# Patient Record
Sex: MALE | Race: BLACK OR AFRICAN AMERICAN | Employment: OTHER | URBAN - METROPOLITAN AREA
[De-identification: names, ages, dates, MRNs, and addresses within clinical notes are randomized per-mention and may not be internally consistent; named-entity substitution may affect disease eponyms.]

---

## 2022-09-01 ENCOUNTER — HOSPITAL ENCOUNTER (EMERGENCY)
Age: 42
Discharge: NURSING, REHAB OR LONG-TERM CARE | End: 2022-09-01
Attending: EMERGENCY MEDICINE
Payer: COMMERCIAL

## 2022-09-01 ENCOUNTER — APPOINTMENT (OUTPATIENT)
Dept: GENERAL RADIOLOGY | Age: 42
End: 2022-09-01
Payer: COMMERCIAL

## 2022-09-01 VITALS
HEART RATE: 102 BPM | SYSTOLIC BLOOD PRESSURE: 119 MMHG | TEMPERATURE: 98.6 F | HEIGHT: 73 IN | RESPIRATION RATE: 18 BRPM | DIASTOLIC BLOOD PRESSURE: 72 MMHG | OXYGEN SATURATION: 97 %
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
## 2022-09-01 PROCEDURE — 72170 X-RAY EXAM OF PELVIS: CPT

## 2022-09-01 PROCEDURE — 6370000000 HC RX 637 (ALT 250 FOR IP): Performed by: EMERGENCY MEDICINE

## 2022-09-01 PROCEDURE — 73552 X-RAY EXAM OF FEMUR 2/>: CPT

## 2022-09-01 PROCEDURE — 73560 X-RAY EXAM OF KNEE 1 OR 2: CPT

## 2022-09-01 PROCEDURE — 99283 EMERGENCY DEPT VISIT LOW MDM: CPT

## 2022-09-01 RX ORDER — IBUPROFEN 400 MG/1
800 TABLET ORAL ONCE
Status: COMPLETED | OUTPATIENT
Start: 2022-09-01 | End: 2022-09-01

## 2022-09-01 RX ADMIN — IBUPROFEN 800 MG: 400 TABLET, FILM COATED ORAL at 19:29

## 2022-09-01 NOTE — ED PROVIDER NOTES
[address] [facility]          ATTENDING PHYSICIAN NOTE       Date of evaluation: 9/1/2022    Chief Complaint     Fall Iris Shah in hallway and hit head, co shoulder pain )      History of Present Illness     Sushil Manley is a 38 y.o. male who presents the emergency department with a complaint of left leg pain after a fall. Patient had a witnessed mechanical fall in the hallway of his nursing facility. He has a history of psychiatric disease as well as MRDD and is a resident of a nursing facility. Reports are that he was walking on the hallway when his knee buckled underneath him secondary to a previous injury on the left ankle. He has a known nonunion fracture of the left ankle. Patient states that he did strike his head there is no report that he passed out or lost consciousness no report of shaking episode or seizure-like activity. Patient is not the most consistent historian but is noting pain in the left knee and left upper leg. Otherwise denies headache. Has not had any observed nausea and vomiting since the time of the fall    Review of Systems     As documented in the HPI, otherwise all other systems were reviewed and were negative. Past Medical, Surgical, Family, and Social History     He has no past medical history on file. He has no past surgical history on file. His family history is not on file. He reports that he has been smoking cigarettes. He has never used smokeless tobacco. He reports current drug use. Drug: Marijuana Quintero Kugel). He reports that he does not drink alcohol. Medications     Previous Medications    No medications on file       Allergies     He has No Known Allergies.     Physical Exam     INITIAL VITALS: BP: 119/72, Temp: 98.6 °F (37 °C), Heart Rate: (!) 102, Resp: 18, SpO2: 97 %   General: 38year-old male sitting in bed no apparent cardiorespiratory distress  HEENT:  head is atraumatic, sclera are clear, oropharynx is nonerythematous, mucus membranes are moist  Neck: Trachea midline  Chest: Nonlabored respirations  Cardiovascular: Regular, rate, and rhythm, 2+ radial pulses bilaterally  Abdominal: Nondistended abdomen  Skin: Warm, dry well perfused, ecchymoses over the left knee  Musculoskeletal: Tenderness palpation with some subcutaneous swelling and ecchymoses over the left knee, some mild tenderness in the left proximal femur as well with a negative logroll, otherwise does not demonstrate any tenderness or bony deformity on palpation of his other extremities  Neurologic:  speech is clear and intact without dysarthria, intact strength in all 4 extremities, sensation intact light touch in all 4 extremities, GCS of 15. Diagnostic Results     RADIOLOGY:  XR KNEE LEFT (1-2 VIEWS)   Final Result   Impression:   No acute osseous injury. XR FEMUR LEFT (MIN 2 VIEWS)   Final Result   Impression:   No acute osseous injury. Nonspecific soft tissue calcification the proximal left thigh. XR PELVIS (1-2 VIEWS)   Final Result   Impression:   No acute osseous injury. LABS:   No results found for this visit on 09/01/22. ED BEDSIDE ULTRASOUND:  No results found. RECENT VITALS:  BP: 119/72, Temp: 98.6 °F (37 °C), Heart Rate: (!) 102, Resp: 18, SpO2: 97 %       ED Course     Nursing Notes, Past Medical Hx, Past Surgical Hx, Social Hx, Allergies, and Family Hx were reviewed. The patient was given the following medications:  Orders Placed This Encounter   Medications    ibuprofen (ADVIL;MOTRIN) tablet 800 mg       CONSULTS:  None    Gus Billyeni / RYANN / Yasmin Inge is a 38 y.o. male presenting to the emergency department after an apparent mechanical fall while walking in the hallway of his nursing facility.   This was witnessed he may or may not have struck his head but on physical examination has no observable signs of trauma above the clavicles and is at his baseline mental status has not had any high risk features and does not have a high risk mechanism of injury therefore I did not feels if any neuro imaging was necessary. The patient had an x-ray of the left knee left femur and left hip with pelvis performed given his ecchymoses and tenderness on exam.  These have resulted back showing no evidence of any acute fractures. Patient was felt safe for discharge back to his care facility. He was given ibuprofen for treatment of pain while here in the emergency department with did improve his symptoms somewhat. Clinical Impression     1.  Fall, initial encounter        Disposition     PATIENT REFERRED TO:  [facility] Emergency Department  [address]  [phone]    If symptoms worsen    DISCHARGE MEDICATIONS:  New Prescriptions    No medications on file       DISPOSITION Decision To Discharge 09/01/2022 07:27:39 PM             Alis Jay MD  09/01/22 Jarett Hicks

## 2022-09-02 NOTE — ED NOTES
Discharge instructions provided to patient by RN at bedside. No further concerns addressed at this time.      Silvio Koroma RN  09/01/22 0855

## 2022-11-15 ENCOUNTER — HOSPITAL ENCOUNTER (INPATIENT)
Age: 42
LOS: 8 days | Discharge: NURSING, REHAB OR LONG-TERM CARE | DRG: 287 | End: 2022-11-23
Attending: EMERGENCY MEDICINE | Admitting: INTERNAL MEDICINE
Payer: COMMERCIAL

## 2022-11-15 ENCOUNTER — APPOINTMENT (OUTPATIENT)
Dept: GENERAL RADIOLOGY | Age: 42
DRG: 287 | End: 2022-11-15
Payer: COMMERCIAL

## 2022-11-15 DIAGNOSIS — R77.8 TROPONIN LEVEL ELEVATED: Primary | ICD-10-CM

## 2022-11-15 DIAGNOSIS — R73.9 HYPERGLYCEMIA: ICD-10-CM

## 2022-11-15 PROBLEM — R07.9 CHEST PAIN: Status: ACTIVE | Noted: 2022-11-15

## 2022-11-15 LAB
A/G RATIO: 1.7 (ref 1.1–2.2)
ALBUMIN SERPL-MCNC: 4.3 G/DL (ref 3.4–5)
ALP BLD-CCNC: 135 U/L (ref 40–129)
ALT SERPL-CCNC: 64 U/L (ref 10–40)
ANION GAP SERPL CALCULATED.3IONS-SCNC: 13 MMOL/L (ref 3–16)
AST SERPL-CCNC: 19 U/L (ref 15–37)
BANDED NEUTROPHILS RELATIVE PERCENT: 2 % (ref 0–7)
BASE EXCESS VENOUS: 3.9 MMOL/L (ref -2–3)
BASOPHILS ABSOLUTE: 0 K/UL (ref 0–0.2)
BASOPHILS RELATIVE PERCENT: 0 %
BILIRUB SERPL-MCNC: <0.2 MG/DL (ref 0–1)
BUN BLDV-MCNC: 33 MG/DL (ref 7–20)
CALCIUM SERPL-MCNC: 10.1 MG/DL (ref 8.3–10.6)
CARBOXYHEMOGLOBIN: 2 % (ref 0–1.5)
CHLORIDE BLD-SCNC: 92 MMOL/L (ref 99–110)
CHP ED QC CHECK: NORMAL
CO2: 28 MMOL/L (ref 21–32)
CREAT SERPL-MCNC: 1.4 MG/DL (ref 0.9–1.3)
EKG ATRIAL RATE: 106 BPM
EKG ATRIAL RATE: 99 BPM
EKG DIAGNOSIS: NORMAL
EKG DIAGNOSIS: NORMAL
EKG P AXIS: 45 DEGREES
EKG P AXIS: 46 DEGREES
EKG P-R INTERVAL: 144 MS
EKG P-R INTERVAL: 148 MS
EKG Q-T INTERVAL: 328 MS
EKG Q-T INTERVAL: 342 MS
EKG QRS DURATION: 102 MS
EKG QRS DURATION: 90 MS
EKG QTC CALCULATION (BAZETT): 435 MS
EKG QTC CALCULATION (BAZETT): 438 MS
EKG R AXIS: -6 DEGREES
EKG R AXIS: -6 DEGREES
EKG T AXIS: 55 DEGREES
EKG T AXIS: 9 DEGREES
EKG VENTRICULAR RATE: 106 BPM
EKG VENTRICULAR RATE: 99 BPM
EOSINOPHILS ABSOLUTE: 0 K/UL (ref 0–0.6)
EOSINOPHILS RELATIVE PERCENT: 0 %
GFR SERPL CREATININE-BSD FRML MDRD: >60 ML/MIN/{1.73_M2}
GLUCOSE BLD-MCNC: 295 MG/DL
GLUCOSE BLD-MCNC: 295 MG/DL (ref 70–99)
GLUCOSE BLD-MCNC: 406 MG/DL (ref 70–99)
GLUCOSE BLD-MCNC: 443 MG/DL (ref 70–99)
GLUCOSE BLD-MCNC: 575 MG/DL (ref 70–99)
HCO3 VENOUS: 30 MMOL/L (ref 24–28)
HCT VFR BLD CALC: 39 % (ref 40.5–52.5)
HEMOGLOBIN, VEN, REDUCED: 20.3 %
HEMOGLOBIN: 12.9 G/DL (ref 13.5–17.5)
LYMPHOCYTES ABSOLUTE: 2.3 K/UL (ref 1–5.1)
LYMPHOCYTES RELATIVE PERCENT: 30 %
MCH RBC QN AUTO: 28.8 PG (ref 26–34)
MCHC RBC AUTO-ENTMCNC: 33.1 G/DL (ref 31–36)
MCV RBC AUTO: 86.9 FL (ref 80–100)
METAMYELOCYTES RELATIVE PERCENT: 5 %
METHEMOGLOBIN VENOUS: 0.3 % (ref 0–1.5)
MONOCYTES ABSOLUTE: 0.9 K/UL (ref 0–1.3)
MONOCYTES RELATIVE PERCENT: 12 %
MYELOCYTE PERCENT: 3 %
NEUTROPHILS ABSOLUTE: 4.4 K/UL (ref 1.7–7.7)
NEUTROPHILS RELATIVE PERCENT: 48 %
O2 SAT, VEN: 79 %
PCO2, VEN: 50 MMHG (ref 41–51)
PDW BLD-RTO: 14.6 % (ref 12.4–15.4)
PERFORMED ON: ABNORMAL
PH VENOUS: 7.39 (ref 7.35–7.45)
PLATELET # BLD: 173 K/UL (ref 135–450)
PMV BLD AUTO: 9.3 FL (ref 5–10.5)
PO2, VEN: 46.7 MMHG (ref 25–40)
POTASSIUM REFLEX MAGNESIUM: 4.1 MMOL/L (ref 3.5–5.1)
PRO-BNP: 31 PG/ML (ref 0–124)
RBC # BLD: 4.48 M/UL (ref 4.2–5.9)
SODIUM BLD-SCNC: 133 MMOL/L (ref 136–145)
TCO2 CALC VENOUS: 32 MMOL/L
TOTAL PROTEIN: 6.8 G/DL (ref 6.4–8.2)
TROPONIN: 0.1 NG/ML
TROPONIN: 0.1 NG/ML
WBC # BLD: 7.6 K/UL (ref 4–11)

## 2022-11-15 PROCEDURE — 6360000002 HC RX W HCPCS

## 2022-11-15 PROCEDURE — 85025 COMPLETE CBC W/AUTO DIFF WBC: CPT

## 2022-11-15 PROCEDURE — 80061 LIPID PANEL: CPT

## 2022-11-15 PROCEDURE — 80053 COMPREHEN METABOLIC PANEL: CPT

## 2022-11-15 PROCEDURE — 6370000000 HC RX 637 (ALT 250 FOR IP): Performed by: EMERGENCY MEDICINE

## 2022-11-15 PROCEDURE — 83880 ASSAY OF NATRIURETIC PEPTIDE: CPT

## 2022-11-15 PROCEDURE — 84484 ASSAY OF TROPONIN QUANT: CPT

## 2022-11-15 PROCEDURE — 83036 HEMOGLOBIN GLYCOSYLATED A1C: CPT

## 2022-11-15 PROCEDURE — 93005 ELECTROCARDIOGRAM TRACING: CPT | Performed by: EMERGENCY MEDICINE

## 2022-11-15 PROCEDURE — 1200000000 HC SEMI PRIVATE

## 2022-11-15 PROCEDURE — 36415 COLL VENOUS BLD VENIPUNCTURE: CPT

## 2022-11-15 PROCEDURE — 6370000000 HC RX 637 (ALT 250 FOR IP)

## 2022-11-15 PROCEDURE — 71046 X-RAY EXAM CHEST 2 VIEWS: CPT

## 2022-11-15 PROCEDURE — 83721 ASSAY OF BLOOD LIPOPROTEIN: CPT

## 2022-11-15 PROCEDURE — 2580000003 HC RX 258

## 2022-11-15 PROCEDURE — 2580000003 HC RX 258: Performed by: EMERGENCY MEDICINE

## 2022-11-15 PROCEDURE — 82803 BLOOD GASES ANY COMBINATION: CPT

## 2022-11-15 PROCEDURE — 99285 EMERGENCY DEPT VISIT HI MDM: CPT

## 2022-11-15 RX ORDER — INSULIN DEGLUDEC INJECTION 100 U/ML
55 INJECTION, SOLUTION SUBCUTANEOUS NIGHTLY
Status: ON HOLD | COMMUNITY
End: 2022-11-21 | Stop reason: HOSPADM

## 2022-11-15 RX ORDER — SENNA PLUS 8.6 MG/1
1 TABLET ORAL 2 TIMES DAILY
COMMUNITY

## 2022-11-15 RX ORDER — QUETIAPINE FUMARATE 200 MG/1
200 TABLET, FILM COATED ORAL 4 TIMES DAILY
COMMUNITY

## 2022-11-15 RX ORDER — DIVALPROEX SODIUM 125 MG/1
500 CAPSULE, COATED PELLETS ORAL 3 TIMES DAILY
Status: DISCONTINUED | OUTPATIENT
Start: 2022-11-15 | End: 2022-11-23 | Stop reason: HOSPADM

## 2022-11-15 RX ORDER — ONDANSETRON 4 MG/1
4 TABLET, ORALLY DISINTEGRATING ORAL EVERY 8 HOURS PRN
Status: DISCONTINUED | OUTPATIENT
Start: 2022-11-15 | End: 2022-11-23 | Stop reason: HOSPADM

## 2022-11-15 RX ORDER — ACETAMINOPHEN 325 MG/1
975 TABLET ORAL EVERY 6 HOURS PRN
COMMUNITY

## 2022-11-15 RX ORDER — ACETAMINOPHEN 650 MG/1
650 SUPPOSITORY RECTAL EVERY 6 HOURS PRN
Status: DISCONTINUED | OUTPATIENT
Start: 2022-11-15 | End: 2022-11-23 | Stop reason: HOSPADM

## 2022-11-15 RX ORDER — BENZTROPINE MESYLATE 1 MG/1
1 TABLET ORAL 2 TIMES DAILY
COMMUNITY

## 2022-11-15 RX ORDER — ATORVASTATIN CALCIUM 40 MG/1
40 TABLET, FILM COATED ORAL NIGHTLY
Status: DISCONTINUED | OUTPATIENT
Start: 2022-11-15 | End: 2022-11-18

## 2022-11-15 RX ORDER — SODIUM CHLORIDE 0.9 % (FLUSH) 0.9 %
5-40 SYRINGE (ML) INJECTION PRN
Status: DISCONTINUED | OUTPATIENT
Start: 2022-11-15 | End: 2022-11-23 | Stop reason: HOSPADM

## 2022-11-15 RX ORDER — ISOSORBIDE MONONITRATE 60 MG/1
60 TABLET, EXTENDED RELEASE ORAL DAILY
Status: ON HOLD | COMMUNITY
End: 2022-11-21 | Stop reason: HOSPADM

## 2022-11-15 RX ORDER — ASPIRIN 81 MG/1
81 TABLET, CHEWABLE ORAL DAILY
COMMUNITY

## 2022-11-15 RX ORDER — HALOPERIDOL 20 MG/1
20 TABLET ORAL 3 TIMES DAILY
COMMUNITY

## 2022-11-15 RX ORDER — DOXYCYCLINE HYCLATE 50 MG/1
324 CAPSULE, GELATIN COATED ORAL
COMMUNITY

## 2022-11-15 RX ORDER — DIVALPROEX SODIUM 125 MG/1
500 CAPSULE, COATED PELLETS ORAL 3 TIMES DAILY
COMMUNITY

## 2022-11-15 RX ORDER — HEPARIN SODIUM 10000 [USP'U]/100ML
5-30 INJECTION, SOLUTION INTRAVENOUS CONTINUOUS
Status: DISCONTINUED | OUTPATIENT
Start: 2022-11-15 | End: 2022-11-16

## 2022-11-15 RX ORDER — FUROSEMIDE 20 MG/1
20 TABLET ORAL DAILY
Status: ON HOLD | COMMUNITY
End: 2022-11-23 | Stop reason: SDUPTHER

## 2022-11-15 RX ORDER — INSULIN LISPRO 100 [IU]/ML
10 INJECTION, SOLUTION INTRAVENOUS; SUBCUTANEOUS ONCE
Status: COMPLETED | OUTPATIENT
Start: 2022-11-15 | End: 2022-11-15

## 2022-11-15 RX ORDER — INSULIN ASPART 100 [IU]/ML
INJECTION, SOLUTION INTRAVENOUS; SUBCUTANEOUS
COMMUNITY

## 2022-11-15 RX ORDER — SODIUM CHLORIDE 9 MG/ML
INJECTION, SOLUTION INTRAVENOUS PRN
Status: DISCONTINUED | OUTPATIENT
Start: 2022-11-15 | End: 2022-11-23 | Stop reason: HOSPADM

## 2022-11-15 RX ORDER — SPIRONOLACTONE 50 MG/1
50 TABLET, FILM COATED ORAL DAILY
Status: ON HOLD | COMMUNITY
End: 2022-11-23 | Stop reason: SDUPTHER

## 2022-11-15 RX ORDER — HEPARIN SODIUM 1000 [USP'U]/ML
4000 INJECTION, SOLUTION INTRAVENOUS; SUBCUTANEOUS PRN
Status: DISCONTINUED | OUTPATIENT
Start: 2022-11-15 | End: 2022-11-23

## 2022-11-15 RX ORDER — CLONIDINE 0.3 MG/24H
1 PATCH, EXTENDED RELEASE TRANSDERMAL WEEKLY
Status: ON HOLD | COMMUNITY
End: 2022-11-21 | Stop reason: HOSPADM

## 2022-11-15 RX ORDER — FENOFIBRATE 200 MG/1
200 CAPSULE ORAL
COMMUNITY

## 2022-11-15 RX ORDER — ONDANSETRON 2 MG/ML
4 INJECTION INTRAMUSCULAR; INTRAVENOUS EVERY 6 HOURS PRN
Status: DISCONTINUED | OUTPATIENT
Start: 2022-11-15 | End: 2022-11-23 | Stop reason: HOSPADM

## 2022-11-15 RX ORDER — LABETALOL HYDROCHLORIDE 5 MG/ML
10 INJECTION, SOLUTION INTRAVENOUS EVERY 4 HOURS PRN
Status: DISCONTINUED | OUTPATIENT
Start: 2022-11-15 | End: 2022-11-23 | Stop reason: HOSPADM

## 2022-11-15 RX ORDER — ENOXAPARIN SODIUM 100 MG/ML
30 INJECTION SUBCUTANEOUS 2 TIMES DAILY
Status: DISCONTINUED | OUTPATIENT
Start: 2022-11-15 | End: 2022-11-15

## 2022-11-15 RX ORDER — HEPARIN SODIUM 1000 [USP'U]/ML
4000 INJECTION, SOLUTION INTRAVENOUS; SUBCUTANEOUS ONCE
Status: COMPLETED | OUTPATIENT
Start: 2022-11-15 | End: 2022-11-15

## 2022-11-15 RX ORDER — DULAGLUTIDE 0.75 MG/.5ML
0.75 INJECTION, SOLUTION SUBCUTANEOUS WEEKLY
COMMUNITY

## 2022-11-15 RX ORDER — LANOLIN ALCOHOL/MO/W.PET/CERES
1000 CREAM (GRAM) TOPICAL DAILY
COMMUNITY

## 2022-11-15 RX ORDER — VALSARTAN 160 MG/1
160 TABLET ORAL 2 TIMES DAILY
Status: ON HOLD | COMMUNITY
End: 2022-11-21 | Stop reason: HOSPADM

## 2022-11-15 RX ORDER — HYDRALAZINE HYDROCHLORIDE 25 MG/1
25 TABLET, FILM COATED ORAL 3 TIMES DAILY
COMMUNITY

## 2022-11-15 RX ORDER — HEPARIN SODIUM 1000 [USP'U]/ML
2000 INJECTION, SOLUTION INTRAVENOUS; SUBCUTANEOUS PRN
Status: DISCONTINUED | OUTPATIENT
Start: 2022-11-15 | End: 2022-11-23

## 2022-11-15 RX ORDER — CARVEDILOL 25 MG/1
50 TABLET ORAL 2 TIMES DAILY
Status: DISCONTINUED | OUTPATIENT
Start: 2022-11-15 | End: 2022-11-23 | Stop reason: HOSPADM

## 2022-11-15 RX ORDER — NIFEDIPINE 90 MG/1
90 TABLET, FILM COATED, EXTENDED RELEASE ORAL DAILY
Status: ON HOLD | COMMUNITY
End: 2022-11-21 | Stop reason: HOSPADM

## 2022-11-15 RX ORDER — INSULIN LISPRO 100 [IU]/ML
0-4 INJECTION, SOLUTION INTRAVENOUS; SUBCUTANEOUS NIGHTLY
Status: DISCONTINUED | OUTPATIENT
Start: 2022-11-15 | End: 2022-11-23 | Stop reason: HOSPADM

## 2022-11-15 RX ORDER — ATORVASTATIN CALCIUM 10 MG/1
10 TABLET, FILM COATED ORAL DAILY
Status: ON HOLD | COMMUNITY
End: 2022-11-21 | Stop reason: HOSPADM

## 2022-11-15 RX ORDER — INSULIN LISPRO 100 [IU]/ML
0-16 INJECTION, SOLUTION INTRAVENOUS; SUBCUTANEOUS
Status: DISCONTINUED | OUTPATIENT
Start: 2022-11-16 | End: 2022-11-23 | Stop reason: HOSPADM

## 2022-11-15 RX ORDER — CHLORTHALIDONE 25 MG/1
75 TABLET ORAL DAILY
Status: ON HOLD | COMMUNITY
End: 2022-11-23 | Stop reason: HOSPADM

## 2022-11-15 RX ORDER — ASPIRIN 81 MG/1
81 TABLET, CHEWABLE ORAL DAILY
Status: DISCONTINUED | OUTPATIENT
Start: 2022-11-16 | End: 2022-11-23 | Stop reason: HOSPADM

## 2022-11-15 RX ORDER — ACETAMINOPHEN 325 MG/1
650 TABLET ORAL EVERY 6 HOURS PRN
Status: DISCONTINUED | OUTPATIENT
Start: 2022-11-15 | End: 2022-11-23 | Stop reason: HOSPADM

## 2022-11-15 RX ORDER — ASPIRIN 81 MG/1
324 TABLET, CHEWABLE ORAL ONCE
Status: COMPLETED | OUTPATIENT
Start: 2022-11-15 | End: 2022-11-15

## 2022-11-15 RX ORDER — SODIUM CHLORIDE 0.9 % (FLUSH) 0.9 %
5-40 SYRINGE (ML) INJECTION EVERY 12 HOURS SCHEDULED
Status: DISCONTINUED | OUTPATIENT
Start: 2022-11-15 | End: 2022-11-23 | Stop reason: HOSPADM

## 2022-11-15 RX ORDER — CARVEDILOL 25 MG/1
25 TABLET ORAL 2 TIMES DAILY
Status: DISCONTINUED | OUTPATIENT
Start: 2022-11-15 | End: 2022-11-15

## 2022-11-15 RX ORDER — FUROSEMIDE 20 MG/1
20 TABLET ORAL DAILY
Status: DISCONTINUED | OUTPATIENT
Start: 2022-11-16 | End: 2022-11-23 | Stop reason: HOSPADM

## 2022-11-15 RX ORDER — SODIUM CHLORIDE, SODIUM LACTATE, POTASSIUM CHLORIDE, AND CALCIUM CHLORIDE .6; .31; .03; .02 G/100ML; G/100ML; G/100ML; G/100ML
1000 INJECTION, SOLUTION INTRAVENOUS ONCE
Status: COMPLETED | OUTPATIENT
Start: 2022-11-15 | End: 2022-11-15

## 2022-11-15 RX ORDER — OMEPRAZOLE 20 MG/1
20 CAPSULE, DELAYED RELEASE ORAL DAILY
COMMUNITY

## 2022-11-15 RX ORDER — PANTOPRAZOLE SODIUM 40 MG/1
40 TABLET, DELAYED RELEASE ORAL DAILY
Status: DISCONTINUED | OUTPATIENT
Start: 2022-11-16 | End: 2022-11-23 | Stop reason: HOSPADM

## 2022-11-15 RX ORDER — DEXTROSE MONOHYDRATE 100 MG/ML
INJECTION, SOLUTION INTRAVENOUS CONTINUOUS PRN
Status: DISCONTINUED | OUTPATIENT
Start: 2022-11-15 | End: 2022-11-23 | Stop reason: HOSPADM

## 2022-11-15 RX ORDER — CARVEDILOL 25 MG/1
50 TABLET ORAL 2 TIMES DAILY WITH MEALS
COMMUNITY

## 2022-11-15 RX ORDER — POLYETHYLENE GLYCOL 3350 17 G/17G
17 POWDER, FOR SOLUTION ORAL DAILY PRN
Status: DISCONTINUED | OUTPATIENT
Start: 2022-11-15 | End: 2022-11-23 | Stop reason: HOSPADM

## 2022-11-15 RX ADMIN — INSULIN LISPRO 10 UNITS: 100 INJECTION, SOLUTION INTRAVENOUS; SUBCUTANEOUS at 22:49

## 2022-11-15 RX ADMIN — ASPIRIN 324 MG: 81 TABLET, CHEWABLE ORAL at 19:43

## 2022-11-15 RX ADMIN — CARVEDILOL 50 MG: 25 TABLET, FILM COATED ORAL at 22:15

## 2022-11-15 RX ADMIN — ATORVASTATIN CALCIUM 40 MG: 40 TABLET, FILM COATED ORAL at 22:15

## 2022-11-15 RX ADMIN — HEPARIN SODIUM 4000 UNITS: 1000 INJECTION INTRAVENOUS; SUBCUTANEOUS at 22:15

## 2022-11-15 RX ADMIN — INSULIN GLARGINE 20 UNITS: 100 INJECTION, SOLUTION SUBCUTANEOUS at 22:49

## 2022-11-15 RX ADMIN — INSULIN LISPRO 4 UNITS: 100 INJECTION, SOLUTION INTRAVENOUS; SUBCUTANEOUS at 22:48

## 2022-11-15 RX ADMIN — SODIUM CHLORIDE, PRESERVATIVE FREE 10 ML: 5 INJECTION INTRAVENOUS at 22:16

## 2022-11-15 RX ADMIN — SODIUM CHLORIDE, POTASSIUM CHLORIDE, SODIUM LACTATE AND CALCIUM CHLORIDE 1000 ML: 600; 310; 30; 20 INJECTION, SOLUTION INTRAVENOUS at 13:56

## 2022-11-15 RX ADMIN — HEPARIN SODIUM 7 UNITS/KG/HR: 10000 INJECTION, SOLUTION INTRAVENOUS at 22:20

## 2022-11-15 RX ADMIN — DIVALPROEX SODIUM 500 MG: 125 CAPSULE, COATED PELLETS ORAL at 22:15

## 2022-11-15 NOTE — ED NOTES
Unable to obtain urine form patient because the patient peed on the floor when attempting to use the urinal. Will try to attempt to get urine sample later.       Gigi Rinne, MASOUD  11/15/22 [facility], RN  11/15/22 [id number]

## 2022-11-15 NOTE — ED PROVIDER NOTES
Presenting with hyperglycemia, on insulin at home. Slopped out of bed and landed on floor two days ago at group home. Ambulatory and no midline tenderness. Getting repeat trop, IVF. ED Course as of 11/15/22 1736   Tue Nov 15, 2022   1715 Update: Repeat troponin elevated. Repeat EKG obtained. On my review EKG largely unchanged though aVL and V2 very slightly elevated when compared to prior without other reciprocal changes. With this and elevated troponin, will admit. Attempted to call patient's guardian, Monik Cazaresreshma, [phone], however call was unanswered. We will attempt to get in contact with group home though patient does not know phone number. [MM]   1734 Loaded with ASA but without current CP, only very subtle EKG changes that may be due in part to lead placement/unstable baseline, and stable troponin, will not heparinize at this time.   [MM]      ED Course User Index  [MM] Anuj Brandt MD     Clinical impression: Elevated troponin          Anuj Brandt MD  11/15/22 2605

## 2022-11-15 NOTE — ED PROVIDER NOTES
[address] [facility]          ATTENDING PHYSICIAN NOTE       Date of evaluation: 11/15/2022    Chief Complaint     Fall (Patient had a fall about two days ago and today is having complaints of back pain. ) and Hyperglycemia (Patient en route to hospital via EMS found patient to be hyperglycemic. Upon arrival at ED patient glucose was 449 via fingerstick. )      History of Present Illness     Hellen Navarro is a 38 y.o. male with past medical history of asthma, COPD, CHF, dementia, hypertension, cognitive impairment, schizoaffective disorder, type 2 diabetes who presents to the emergency department with back pain and hyperglycemia. Patient initially called EMS when he was having back pain. Patient does suffer from chronic back pain, worse since 4 days ago when he said he slid off of the edge of his bed onto the floor and landed on his posterior. Pain is midline, nonradiating, no definite exacerbating or alleviating factors. No urinary retention or fecal incontinence. Able to ambulate without significant pain. To me he does raise concern for feeling that \"his chest is collapsing. \"  Denies chest pain, but says that he does have some shortness of breath. Is not able to tell me a definitive timeline for the symptoms. EMS raised additional concerns and they noticed that his blood glucose was 449. Patient reports that he takes insulin for his diabetes and has assistance with his medications at the facility where he currently resides. Denies medication noncompliance. Review of Systems     Review of Systems    Pertinent positive and negative findings as documented in the HPI, otherwise other systems were reviewed and were negative. Past Medical, Surgical, Family, and Social History     He has no past medical history on file. He has no past surgical history on file. His family history is not on file. He reports that he has been smoking cigarettes.  He has never used smokeless tobacco. He reports that he does not currently use drugs after having used the following drugs: Marijuana Hamler Spina). He reports that he does not drink alcohol. Medications     Previous Medications    No medications on file       Allergies     He has No Known Allergies. Physical Exam     INITIAL VITALS: BP: 137/85, Temp: 97.7 °F (36.5 °C), Heart Rate: (!) 106, Resp: 16, SpO2: 97 %   Physical Exam    General: Well developed and well nourished. No acute distress. HEENT: NCAT, PERRL, moist mucous membranes  Neck: Trachea midline, neck supple with FROM  Heart: Regular rate and rhythm. No murmurs, gallops, or rubs appreciated. Lungs: Clear to auscultation in all fields bilaterally. Normal effort. Abd: Nondistended, no signs of trauma. No tenderness to palpation, guarding, or rebound. MSK: No obvious deformities. Range of motion grossly intact. Extremities: No cyanosis or edema. Peripheral pulses intact. Skin: No rashes, abrasions, contusions, or lacerations noted. Neuro: Alert and oriented, moves all extremities spontaneously. No gross motor or sensory deficits. Psych: Mood and affect appropriate. Thought process and content normal.    Diagnostic Results     EKG   Sinus tachycardia at 106 bpm with normal axis, normal intervals. J-point ST elevation through precordial leads with no reciprocal ST changes. No prior EKGs available for comparison.     RADIOLOGY:  XR CHEST (2 VW)   Final Result      Bibasilar atelectatic changes          LABS:   Results for orders placed or performed during the hospital encounter of 11/15/22   CBC with Auto Differential   Result Value Ref Range    WBC 7.6 4.0 - 11.0 K/uL    RBC 4.48 4.20 - 5.90 M/uL    Hemoglobin 12.9 (L) 13.5 - 17.5 g/dL    Hematocrit 39.0 (L) 40.5 - 52.5 %    MCV 86.9 80.0 - 100.0 fL    MCH 28.8 26.0 - 34.0 pg    MCHC 33.1 31.0 - 36.0 g/dL    RDW 14.6 12.4 - 15.4 %    Platelets 422 130 - 673 K/uL    MPV 9.3 5.0 - 10.5 fL    Neutrophils % 48.0 % Lymphocytes % 30.0 %    Monocytes % 12.0 %    Eosinophils % 0.0 %    Basophils % 0.0 %    Neutrophils Absolute 4.4 1.7 - 7.7 K/uL    Lymphocytes Absolute 2.3 1.0 - 5.1 K/uL    Monocytes Absolute 0.9 0.0 - 1.3 K/uL    Eosinophils Absolute 0.0 0.0 - 0.6 K/uL    Basophils Absolute 0.0 0.0 - 0.2 K/uL    Bands Relative 2 0 - 7 %    Metamyelocytes Relative 5 (A) %    Myelocyte Percent 3 (A) %   CMP w/ Reflex to MG   Result Value Ref Range    Sodium 133 (L) 136 - 145 mmol/L    Potassium reflex Magnesium 4.1 3.5 - 5.1 mmol/L    Chloride 92 (L) 99 - 110 mmol/L    CO2 28 21 - 32 mmol/L    Anion Gap 13 3 - 16    Glucose 406 (H) 70 - 99 mg/dL    BUN 33 (H) 7 - 20 mg/dL    Creatinine 1.4 (H) 0.9 - 1.3 mg/dL    Est, Glom Filt Rate >60 >60    Calcium 10.1 8.3 - 10.6 mg/dL    Total Protein 6.8 6.4 - 8.2 g/dL    Albumin 4.3 3.4 - 5.0 g/dL    Albumin/Globulin Ratio 1.7 1.1 - 2.2    Total Bilirubin <0.2 0.0 - 1.0 mg/dL    Alkaline Phosphatase 135 (H) 40 - 129 U/L    ALT 64 (H) 10 - 40 U/L    AST 19 15 - 37 U/L   Blood gas, venous (Lab)   Result Value Ref Range    pH, Lewis 7.387 7.350 - 7.450    pCO2, Lewis 50.0 41.0 - 51.0 mmHg    pO2, Lewis 46.7 (H) 25.0 - 40.0 mmHg    HCO3, Venous 30.0 (H) 24.0 - 28.0 mmol/L    Base Excess, Lewis 3.9 (H) -2.0 - 3.0 mmol/L    O2 Sat, Lewis 79 Not established %    Carboxyhemoglobin 2.0 (H) 0.0 - 1.5 %    MetHgb, Lewis 0.3 0.0 - 1.5 %    TC02 (Calc), Lewis 32 mmol/L    Hemoglobin, Lewis, Reduced 20.30 %   Troponin   Result Value Ref Range    Troponin 0.10 (H) <0.01 ng/mL   POCT Glucose   Result Value Ref Range    POC Glucose 443 (H) 70 - 99 mg/dl    Performed on ACCU-CHEK        ED BEDSIDE ULTRASOUND:  No results found. RECENT VITALS:  BP: 137/89,Temp: 97.7 °F (36.5 °C), Heart Rate: (!) 102, Resp: 16, SpO2: 98 %     Procedures     None    ED Course     Nursing Notes, Past Medical Hx, Past Surgical Hx, Social Hx,Allergies, and Family Hx were reviewed.          patient was given the following medications:  Orders Placed This Encounter   Medications    lactated ringers bolus       CONSULTS:  None    MEDICAL DECISIONMAKING / ASSESSMENT / Katycinthia Mendoza is a 38 y.o. male presenting with back pain, shortness of breath, and hyperglycemia. On presentation patient was afebrile, mildly tachycardic but hemodynamically stable, breathing comfortably on room air and in no acute distress. His heart and lungs were clear to auscultation, there is no midline back tenderness to palpation, no step-offs or deformities, and patient was able to stand and bear weight with no exacerbation of his symptoms. Overall not concerned for an acute spinal cord pathology and so have elected to defer additional work-up. EKG showed no acute ischemic changes, however his troponin was mildly elevated 0.1. No acute findings were noted on chest x-ray. Repeat troponin was ordered and is in process at the time of shift change. Patient was given 325 mg of aspirin. Regarding his hyperglycemia, there are no stigmata of DKA or HHS by lab work or exam.  Patient was given a 1 L bolus of IV fluids and the plan will be to recheck fingerstick glucose after hydration before administering insulin. At this time I am going off service only signing out care to the oncoming provider. The responsibilities will be to follow-up on repeat troponin, repeat blood glucose and treatment of remaining hyperglycemia, ultimate disposition which I expect will be admission due to elevated troponin. Clinical Impression     1. Troponin level elevated    2. Hyperglycemia        Disposition     PATIENT REFERRED TO:  No follow-up provider specified.     DISCHARGE MEDICATIONS:  New Prescriptions    No medications on file       DISPOSITION  pending at shift change          Adolfo Ko MD  11/15/22 [id number]

## 2022-11-16 ENCOUNTER — APPOINTMENT (OUTPATIENT)
Dept: CARDIAC CATH/INVASIVE PROCEDURES | Age: 42
DRG: 287 | End: 2022-11-16
Payer: COMMERCIAL

## 2022-11-16 LAB
ALBUMIN SERPL-MCNC: 3.9 G/DL (ref 3.4–5)
ALP BLD-CCNC: 125 U/L (ref 40–129)
ALT SERPL-CCNC: 60 U/L (ref 10–40)
ANION GAP SERPL CALCULATED.3IONS-SCNC: 12 MMOL/L (ref 3–16)
ANTI-XA UNFRAC HEPARIN: 0.16 IU/ML (ref 0.3–0.7)
ANTI-XA UNFRAC HEPARIN: <0.1 IU/ML (ref 0.3–0.7)
ANTI-XA UNFRAC HEPARIN: <0.1 IU/ML (ref 0.3–0.7)
APTT: 26.9 SEC (ref 23–34.3)
AST SERPL-CCNC: 26 U/L (ref 15–37)
BASOPHILS ABSOLUTE: 0 K/UL (ref 0–0.2)
BASOPHILS RELATIVE PERCENT: 0.5 %
BILIRUB SERPL-MCNC: <0.2 MG/DL (ref 0–1)
BILIRUBIN DIRECT: <0.2 MG/DL (ref 0–0.3)
BILIRUBIN, INDIRECT: ABNORMAL MG/DL (ref 0–1)
BUN BLDV-MCNC: 36 MG/DL (ref 7–20)
CALCIUM SERPL-MCNC: 9.7 MG/DL (ref 8.3–10.6)
CHLORIDE BLD-SCNC: 93 MMOL/L (ref 99–110)
CHOLESTEROL, TOTAL: 280 MG/DL (ref 0–199)
CO2: 25 MMOL/L (ref 21–32)
CREAT SERPL-MCNC: 1.4 MG/DL (ref 0.9–1.3)
EOSINOPHILS ABSOLUTE: 0 K/UL (ref 0–0.6)
EOSINOPHILS RELATIVE PERCENT: 0.5 %
ESTIMATED AVERAGE GLUCOSE: 314.9 MG/DL
GFR SERPL CREATININE-BSD FRML MDRD: >60 ML/MIN/{1.73_M2}
GLUCOSE BLD-MCNC: 300 MG/DL (ref 70–99)
GLUCOSE BLD-MCNC: 311 MG/DL (ref 70–99)
GLUCOSE BLD-MCNC: 325 MG/DL (ref 70–99)
GLUCOSE BLD-MCNC: 394 MG/DL (ref 70–99)
GLUCOSE BLD-MCNC: 409 MG/DL (ref 70–99)
GLUCOSE BLD-MCNC: 432 MG/DL (ref 70–99)
GLUCOSE BLD-MCNC: 510 MG/DL (ref 70–99)
GLUCOSE BLD-MCNC: 577 MG/DL (ref 70–99)
GLUCOSE BLD-MCNC: >600 MG/DL (ref 70–99)
HBA1C MFR BLD: 12.6 %
HCT VFR BLD CALC: 36.2 % (ref 40.5–52.5)
HDLC SERPL-MCNC: 35 MG/DL (ref 40–60)
HEMOGLOBIN: 12 G/DL (ref 13.5–17.5)
INR BLD: 0.98 (ref 0.87–1.14)
LDL CHOLESTEROL CALCULATED: ABNORMAL MG/DL
LDL CHOLESTEROL DIRECT: 159 MG/DL
LV EF: 50 %
LVEF MODALITY: NORMAL
LYMPHOCYTES ABSOLUTE: 2.8 K/UL (ref 1–5.1)
LYMPHOCYTES RELATIVE PERCENT: 38.2 %
MCH RBC QN AUTO: 29.2 PG (ref 26–34)
MCHC RBC AUTO-ENTMCNC: 33.1 G/DL (ref 31–36)
MCV RBC AUTO: 88.3 FL (ref 80–100)
MONOCYTES ABSOLUTE: 0.9 K/UL (ref 0–1.3)
MONOCYTES RELATIVE PERCENT: 11.8 %
NEUTROPHILS ABSOLUTE: 3.5 K/UL (ref 1.7–7.7)
NEUTROPHILS RELATIVE PERCENT: 49 %
PDW BLD-RTO: 14.9 % (ref 12.4–15.4)
PERFORMED ON: ABNORMAL
PLATELET # BLD: 182 K/UL (ref 135–450)
PMV BLD AUTO: 9.7 FL (ref 5–10.5)
POTASSIUM REFLEX MAGNESIUM: 4.2 MMOL/L (ref 3.5–5.1)
PROTHROMBIN TIME: 12.9 SEC (ref 11.7–14.5)
RBC # BLD: 4.09 M/UL (ref 4.2–5.9)
SODIUM BLD-SCNC: 130 MMOL/L (ref 136–145)
TOTAL PROTEIN: 6.4 G/DL (ref 6.4–8.2)
TRIGL SERPL-MCNC: 504 MG/DL (ref 0–150)
TROPONIN: 0.09 NG/ML
VLDLC SERPL CALC-MCNC: ABNORMAL MG/DL
WBC # BLD: 7.3 K/UL (ref 4–11)

## 2022-11-16 PROCEDURE — 6370000000 HC RX 637 (ALT 250 FOR IP)

## 2022-11-16 PROCEDURE — 6360000002 HC RX W HCPCS

## 2022-11-16 PROCEDURE — 6360000004 HC RX CONTRAST MEDICATION: Performed by: STUDENT IN AN ORGANIZED HEALTH CARE EDUCATION/TRAINING PROGRAM

## 2022-11-16 PROCEDURE — 84484 ASSAY OF TROPONIN QUANT: CPT

## 2022-11-16 PROCEDURE — 85520 HEPARIN ASSAY: CPT

## 2022-11-16 PROCEDURE — 85730 THROMBOPLASTIN TIME PARTIAL: CPT

## 2022-11-16 PROCEDURE — 2580000003 HC RX 258

## 2022-11-16 PROCEDURE — 6370000000 HC RX 637 (ALT 250 FOR IP): Performed by: STUDENT IN AN ORGANIZED HEALTH CARE EDUCATION/TRAINING PROGRAM

## 2022-11-16 PROCEDURE — C8929 TTE W OR WO FOL WCON,DOPPLER: HCPCS

## 2022-11-16 PROCEDURE — 80076 HEPATIC FUNCTION PANEL: CPT

## 2022-11-16 PROCEDURE — 80048 BASIC METABOLIC PNL TOTAL CA: CPT

## 2022-11-16 PROCEDURE — 2500000003 HC RX 250 WO HCPCS

## 2022-11-16 PROCEDURE — 85610 PROTHROMBIN TIME: CPT

## 2022-11-16 PROCEDURE — 1200000000 HC SEMI PRIVATE

## 2022-11-16 PROCEDURE — 36415 COLL VENOUS BLD VENIPUNCTURE: CPT

## 2022-11-16 PROCEDURE — 99223 1ST HOSP IP/OBS HIGH 75: CPT | Performed by: INTERNAL MEDICINE

## 2022-11-16 PROCEDURE — 85025 COMPLETE CBC W/AUTO DIFF WBC: CPT

## 2022-11-16 RX ORDER — FENOFIBRATE 160 MG/1
160 TABLET ORAL DAILY
Status: DISCONTINUED | OUTPATIENT
Start: 2022-11-16 | End: 2022-11-23 | Stop reason: HOSPADM

## 2022-11-16 RX ORDER — HALOPERIDOL 5 MG/1
20 TABLET ORAL 3 TIMES DAILY
Status: DISCONTINUED | OUTPATIENT
Start: 2022-11-16 | End: 2022-11-23 | Stop reason: HOSPADM

## 2022-11-16 RX ORDER — QUETIAPINE FUMARATE 200 MG/1
200 TABLET, FILM COATED ORAL 4 TIMES DAILY
Status: DISCONTINUED | OUTPATIENT
Start: 2022-11-16 | End: 2022-11-23 | Stop reason: HOSPADM

## 2022-11-16 RX ORDER — OMEGA-3-ACID ETHYL ESTERS 1 G/1
2 CAPSULE, LIQUID FILLED ORAL 2 TIMES DAILY
Status: DISCONTINUED | OUTPATIENT
Start: 2022-11-16 | End: 2022-11-16

## 2022-11-16 RX ORDER — INSULIN DEGLUDEC INJECTION 100 U/ML
45 INJECTION, SOLUTION SUBCUTANEOUS
Status: ON HOLD | COMMUNITY
End: 2022-11-21 | Stop reason: HOSPADM

## 2022-11-16 RX ORDER — INSULIN LISPRO 100 [IU]/ML
5 INJECTION, SOLUTION INTRAVENOUS; SUBCUTANEOUS ONCE
Status: COMPLETED | OUTPATIENT
Start: 2022-11-16 | End: 2022-11-16

## 2022-11-16 RX ORDER — INSULIN LISPRO 100 [IU]/ML
5 INJECTION, SOLUTION INTRAVENOUS; SUBCUTANEOUS
Status: DISCONTINUED | OUTPATIENT
Start: 2022-11-16 | End: 2022-11-17

## 2022-11-16 RX ORDER — INSULIN LISPRO 100 [IU]/ML
15 INJECTION, SOLUTION INTRAVENOUS; SUBCUTANEOUS ONCE
Status: COMPLETED | OUTPATIENT
Start: 2022-11-16 | End: 2022-11-16

## 2022-11-16 RX ORDER — BENZTROPINE MESYLATE 1 MG/1
1 TABLET ORAL 2 TIMES DAILY
Status: DISCONTINUED | OUTPATIENT
Start: 2022-11-16 | End: 2022-11-23 | Stop reason: HOSPADM

## 2022-11-16 RX ADMIN — PANTOPRAZOLE SODIUM 40 MG: 40 TABLET, DELAYED RELEASE ORAL at 08:32

## 2022-11-16 RX ADMIN — QUETIAPINE FUMARATE 200 MG: 200 TABLET ORAL at 21:03

## 2022-11-16 RX ADMIN — INSULIN LISPRO 15 UNITS: 100 INJECTION, SOLUTION INTRAVENOUS; SUBCUTANEOUS at 02:00

## 2022-11-16 RX ADMIN — FUROSEMIDE 20 MG: 20 TABLET ORAL at 08:32

## 2022-11-16 RX ADMIN — DIVALPROEX SODIUM 500 MG: 125 CAPSULE, COATED PELLETS ORAL at 21:03

## 2022-11-16 RX ADMIN — INSULIN LISPRO 5 UNITS: 100 INJECTION, SOLUTION INTRAVENOUS; SUBCUTANEOUS at 12:43

## 2022-11-16 RX ADMIN — DIVALPROEX SODIUM 500 MG: 125 CAPSULE, COATED PELLETS ORAL at 14:47

## 2022-11-16 RX ADMIN — INSULIN LISPRO 4 UNITS: 100 INJECTION, SOLUTION INTRAVENOUS; SUBCUTANEOUS at 20:59

## 2022-11-16 RX ADMIN — BENZTROPINE MESYLATE 1 MG: 1 TABLET ORAL at 12:42

## 2022-11-16 RX ADMIN — HEPARIN SODIUM 4000 UNITS: 1000 INJECTION INTRAVENOUS; SUBCUTANEOUS at 19:40

## 2022-11-16 RX ADMIN — PERFLUTREN 1.5 ML: 6.52 INJECTION, SUSPENSION INTRAVENOUS at 15:37

## 2022-11-16 RX ADMIN — CARVEDILOL 50 MG: 25 TABLET, FILM COATED ORAL at 08:32

## 2022-11-16 RX ADMIN — HEPARIN SODIUM 7 UNITS/KG/HR: 5000 INJECTION, SOLUTION INTRAVENOUS; SUBCUTANEOUS at 02:06

## 2022-11-16 RX ADMIN — DIVALPROEX SODIUM 500 MG: 125 CAPSULE, COATED PELLETS ORAL at 08:32

## 2022-11-16 RX ADMIN — INSULIN LISPRO 12 UNITS: 100 INJECTION, SOLUTION INTRAVENOUS; SUBCUTANEOUS at 17:52

## 2022-11-16 RX ADMIN — SODIUM CHLORIDE, PRESERVATIVE FREE 10 ML: 5 INJECTION INTRAVENOUS at 08:32

## 2022-11-16 RX ADMIN — FENOFIBRATE 160 MG: 160 TABLET ORAL at 12:42

## 2022-11-16 RX ADMIN — BENZTROPINE MESYLATE 1 MG: 1 TABLET ORAL at 21:04

## 2022-11-16 RX ADMIN — QUETIAPINE FUMARATE 200 MG: 200 TABLET ORAL at 17:45

## 2022-11-16 RX ADMIN — SODIUM CHLORIDE, PRESERVATIVE FREE 10 ML: 5 INJECTION INTRAVENOUS at 21:04

## 2022-11-16 RX ADMIN — QUETIAPINE FUMARATE 200 MG: 200 TABLET ORAL at 12:42

## 2022-11-16 RX ADMIN — ATORVASTATIN CALCIUM 40 MG: 40 TABLET, FILM COATED ORAL at 21:03

## 2022-11-16 RX ADMIN — INSULIN LISPRO 5 UNITS: 100 INJECTION, SOLUTION INTRAVENOUS; SUBCUTANEOUS at 12:44

## 2022-11-16 RX ADMIN — HALOPERIDOL 20 MG: 5 TABLET ORAL at 14:47

## 2022-11-16 RX ADMIN — INSULIN LISPRO 5 UNITS: 100 INJECTION, SOLUTION INTRAVENOUS; SUBCUTANEOUS at 17:52

## 2022-11-16 RX ADMIN — INSULIN LISPRO 12 UNITS: 100 INJECTION, SOLUTION INTRAVENOUS; SUBCUTANEOUS at 12:44

## 2022-11-16 RX ADMIN — HALOPERIDOL 20 MG: 5 TABLET ORAL at 21:03

## 2022-11-16 RX ADMIN — CARVEDILOL 50 MG: 25 TABLET, FILM COATED ORAL at 21:04

## 2022-11-16 RX ADMIN — INSULIN HUMAN 20 UNITS: 100 INJECTION, SOLUTION PARENTERAL at 22:39

## 2022-11-16 RX ADMIN — HEPARIN SODIUM 4000 UNITS: 1000 INJECTION INTRAVENOUS; SUBCUTANEOUS at 07:31

## 2022-11-16 RX ADMIN — ASPIRIN 81 MG: 81 TABLET, CHEWABLE ORAL at 08:32

## 2022-11-16 RX ADMIN — INSULIN LISPRO 16 UNITS: 100 INJECTION, SOLUTION INTRAVENOUS; SUBCUTANEOUS at 08:33

## 2022-11-16 ASSESSMENT — ENCOUNTER SYMPTOMS
GASTROINTESTINAL NEGATIVE: 1
RESPIRATORY NEGATIVE: 1
EYES NEGATIVE: 1

## 2022-11-16 NOTE — CONSULTS
Clinical Pharmacy Progress Note  Medication History      Asked to verify home medications. Home med list updated by pharmacy intern last evening - see note copied below. Only change made from last evening - Tresiba dosing is 45 units qAM + 55 units qHS. Please call with questions--  Thanks--  Christin Hernandez, PharmD, BCPS, [facility] [place]  [id number] ([facility])   11/16/2022 7:08 AM    +++++++++++++++++++++++++++++++++++++++++++++++++++++++++++++++++++++++++++++++++++++++++++++  Pharmacy  Note  - Admission Medication History     List of [id number] medications is complete. I reviewed Rx fill history via \"Complete Dispense Report\" in Epic, and received an updated medication list from [facility]. All the medications listed below we added using a medication list provided by [facility].         Current Outpatient Medications   Medication Instructions    acetaminophen (TYLENOL) 975 mg, Oral, EVERY 6 HOURS PRN    aspirin 81 mg, Oral, DAILY    atorvastatin (LIPITOR) 10 mg, Oral, DAILY    benztropine (COGENTIN) 1 mg, Oral, 2 TIMES DAILY    carvedilol (COREG) 50 mg, Oral, 2 TIMES DAILY WITH MEALS    chlorthalidone (HYGROTON) 75 mg, Oral, DAILY    cloNIDine (CATAPRES) 0.3 MG/24HR PTWK 1 patch, TransDERmal, WEEKLY, On Mondays    divalproex (DEPAKOTE SPRINKLE) 500 mg, Oral, 3 TIMES DAILY    fenofibrate micronized (LOFIBRA) 200 mg, Oral, DAILY BEFORE BREAKFAST    ferrous gluconate (FERGON) 324 mg, Oral, DAILY WITH BREAKFAST    furosemide (LASIX) 20 mg, Oral, DAILY    haloperidol (HALDOL) 20 mg, Oral, 3 TIMES DAILY    hydrALAZINE (APRESOLINE) 25 mg, Oral, 3 TIMES DAILY    insulin aspart (NOVOLOG FLEXPEN) 100 UNIT/ML injection pen SubCUTAneous, 3 TIMES DAILY BEFORE MEALS, Inject per sliding scale: if 200-249 inject 4 units, if 250-299 inject 8 units, if 300-349 inject 10 units, if 350-399 inject 12 units    isosorbide mononitrate (IMDUR) 60 mg, Oral, DAILY    metFORMIN (GLUCOPHAGE) 500 mg, Oral, 2 TIMES DAILY WITH MEALS    NIFEdipine (ADALAT CC) 90 mg, Oral, DAILY    omeprazole (PRILOSEC) 20 mg, Oral, DAILY    QUEtiapine (SEROQUEL) 200 mg, Oral, 4 TIMES DAILY    senna (SENOKOT) 8.6 MG tablet 1 tablet, Oral, 2 TIMES DAILY    spironolactone (ALDACTONE) 50 mg, Oral, DAILY    Tresiba FlexTouch 55 Units, SubCUTAneous, Nightly    Tresiba FlexTouch 45 Units, SubCUTAneous, DAILY BEFORE BREAKFAST    Trulicity 2.42 mg, SubCUTAneous, WEEKLY, On Saturdays    valsartan (DIOVAN) 160 mg, Oral, 2 TIMES DAILY    vitamin B-12 (CYANOCOBALAMIN) 1,000 mcg, Oral, DAILY     11/15/2022 8:27 PM  Messi Bob  PharmD Candidate Class of [employer]

## 2022-11-16 NOTE — CONSULTS
[facility]  Cardiology Inpatient Consult Service                                                                                          Pt Name: Hellen Navarro  Age: 38 y.o. Sex: male  : 1980  Location: 6319/6319-01    Referring Physician: Jillian Sood MD    Primary Cardiologist: None     Reason for Consultation/Chief Complaint: chest pain and elevated troponin, ACS rule out      HPI      Hellen Navarro is a 38 y.o. male with a medical history significant for HTN, HLD, uncontrolled Type II DM, HFmEF (ECHO  shows EF45-50%), COPD, asthma, tobacco use, schizoaffective disorder, and dementia who presented to the hospital with a complaint of back pain and hyperglycemia. He had a fall from his bed onto his knees and abdomen four days prior to arrival. His blood glucose levels were in the 400s; however, he did not appear to be in any signs of DKA per ED note. His HS troponin x2 were mildly elevated at 0.1. EKG did not show acute ischemic changes. He was admitted and was started on a heparin drip. He has additionally been receiving lasix as well as carvedilol, aspirin, and lipitor. He had an ECHO done at [facility] on  which showed an EF of 45-50%, hypokinesis of basal-mid inferoseptal and basal mid inferior myocardium. Prior one in 2020 was similar with EF of 40-45%. His home medication list is extensive, and he does not know what medications he takes. He does not follow with a cardiologist, and there is no cardiologist on file. This morning, he states that the back pain he presented with has improved. Denies any chest pain or pressure but does have some epigastric pain that occasionally radiates down his abdomen. He has had shortness of breath for years but states it is unrelated to exertion or to position. No recent illnesses. He does state he has been more fatigued recently. Patient's guardian is Monik Grime, and her number is on file.      Previous results:  Echo: 7/22 EF 45-50% hypokinesis of basal-mid inferoseptal and basal mid inferior myocardium              9/20: EF 40-45%, hypokinesis of basal-mid inferoseptal and basal mid inferior myocardium   LHC: none found       He denies chest pain, chest pressure/discomfort, and exertional chest pressure/discomfort. ED course:  HS troponin x2 0.1   EKG sinus rhythm negative for acute ischemic changes    given       Histories     Past Medical History:   has no past medical history on file. Surgical History:   has no past surgical history on file. Social History:   reports that he has been smoking cigarettes. He has never used smokeless tobacco. He reports that he does not currently use drugs after having used the following drugs: Marijuana [place]). He reports that he does not drink alcohol. Family History:  No evidence for sudden cardiac death or premature CAD. Medications     Home Medications  Prior to Admission medications    Medication Sig Start Date End Date Taking?  Authorizing Provider   Insulin Degludec (TRESIBA FLEXTOUCH) 100 UNIT/ML SOPN Inject 45 Units into the skin every morning (before breakfast)   Yes Historical Provider, MD   aspirin 81 MG chewable tablet Take 81 mg by mouth daily   Yes Historical Provider, MD   benztropine (COGENTIN) 1 MG tablet Take 1 mg by mouth 2 times daily   Yes Historical Provider, MD   cloNIDine (CATAPRES) 0.3 MG/24HR PTWK Place 1 patch onto the skin once a week On Mondays   Yes Historical Provider, MD   chlorthalidone (HYGROTON) 25 MG tablet Take 75 mg by mouth daily   Yes Historical Provider, MD   carvedilol (COREG) 25 MG tablet Take 50 mg by mouth 2 times daily (with meals)   Yes Historical Provider, MD   vitamin B-12 (CYANOCOBALAMIN) 1000 MCG tablet Take 1,000 mcg by mouth daily   Yes Historical Provider, MD   divalproex (DEPAKOTE SPRINKLE) 125 MG capsule Take 500 mg by mouth 3 times daily   Yes Historical Provider, MD   fenofibrate micronized (LOFIBRA) 200 MG capsule Take 200 mg by mouth every morning (before breakfast)   Yes Historical Provider, MD   ferrous gluconate (FERGON) 324 (38 Fe) MG tablet Take 324 mg by mouth daily (with breakfast)   Yes Historical Provider, MD   haloperidol (HALDOL) 20 MG tablet Take 20 mg by mouth 3 times daily   Yes Historical Provider, MD   hydrALAZINE (APRESOLINE) 25 MG tablet Take 25 mg by mouth 3 times daily   Yes Historical Provider, MD   isosorbide mononitrate (IMDUR) 60 MG extended release tablet Take 60 mg by mouth daily   Yes Historical Provider, MD   furosemide (LASIX) 20 MG tablet Take 20 mg by mouth daily   Yes Historical Provider, MD   atorvastatin (LIPITOR) 10 MG tablet Take 10 mg by mouth daily   Yes Historical Provider, MD   metFORMIN (GLUCOPHAGE) 500 MG tablet Take 500 mg by mouth 2 times daily (with meals)   Yes Historical Provider, MD   NIFEdipine (ADALAT CC) 90 MG extended release tablet Take 90 mg by mouth daily   Yes Historical Provider, MD   insulin aspart (NOVOLOG FLEXPEN) 100 UNIT/ML injection pen Inject into the skin 3 times daily (before meals) Inject per sliding scale: if 200-249 inject 4 units, if 250-299 inject 8 units, if 300-349 inject 10 units, if 350-399 inject 12 units   Yes Historical Provider, MD   omeprazole (PRILOSEC) 20 MG delayed release capsule Take 20 mg by mouth daily   Yes Historical Provider, MD   senna (SENOKOT) 8.6 MG tablet Take 1 tablet by mouth 2 times daily   Yes Historical Provider, MD   QUEtiapine (SEROQUEL) 200 MG tablet Take 200 mg by mouth 4 times daily   Yes Historical Provider, MD   spironolactone (ALDACTONE) 50 MG tablet Take 50 mg by mouth daily   Yes Historical Provider, MD   Insulin Degludec (TRESIBA FLEXTOUCH) 100 UNIT/ML SOPN Inject 55 Units into the skin at bedtime   Yes Historical Provider, MD   Dulaglutide (TRULICITY) 6.73 BN/3.2JY SOPN Inject 0.75 mg into the skin once a week On Saturdays   Yes Historical Provider, MD   acetaminophen (TYLENOL) 325 MG tablet Take 975 mg by mouth every 6 hours as needed for Pain   Yes Historical Provider, MD   valsartan (DIOVAN) 160 MG tablet Take 160 mg by mouth 2 times daily   Yes Historical Provider, MD          Inpatient Medications:   insulin glargine  35 Units SubCUTAneous BID    sodium chloride flush  5-40 mL IntraVENous 2 times per day    atorvastatin  40 mg Oral Nightly    aspirin  81 mg Oral Daily    insulin lispro  0-16 Units SubCUTAneous TID WC    insulin lispro  0-4 Units SubCUTAneous Nightly    divalproex  500 mg Oral TID    furosemide  20 mg Oral Daily    pantoprazole  40 mg Oral Daily    carvedilol  50 mg Oral BID       IV drips:   heparin (PORCINE) Infusion 11 Units/kg/hr (11/16/22 0732)    sodium chloride      dextrose         PRN:  sodium chloride flush, sodium chloride, ondansetron **OR** ondansetron, polyethylene glycol, acetaminophen **OR** acetaminophen, labetalol, heparin (porcine), heparin (porcine), glucose, dextrose bolus **OR** dextrose bolus, glucagon (rDNA), dextrose    Allergy     Patient has no known allergies. Review of Systems     Pertinent positives identified in the HPI, all other review of symptoms negative as below. CONSTITUTIONAL: No unanticipated weight loss, change in energy level, sleep pattern, or activity level. SKIN: No rash or pruritis. EYES: No visual changes or diplopia. ENT: No hearing loss or tinnitus. No mouth sores or sore throat. CARDIOVASCULAR: No chest pain/chest pressure/chest discomfort or palpitations. RESPIRATORY: No dyspnea, cough, wheezing, sputum production, or hematemesis. GASTROINTESTINAL: No nausea, vomiting, diarrhea, constipation, melena, hematochezia, abdominal pain, or appetite loss. GENITOURINARY: No dysuria, trouble voiding, or hematuria. MUSCULOSKELETAL:  No gait disturbance, weakness, or joint complaints. NEUROLOGICAL: No headache, diplopia, change in muscle strength, numbness or tingling.  No change in gait, balance, coordination, mood, affect, memory, mentation, behavior. PSYCH: No anxiety, loss of interest, change in sexual behavior, feelings of self-harm, or confusion. ENDOCRINE: No malaise, fatigue or temperature intolerance. No excessive thirst, fluid intake, or urination. No tremor. HEMATOLOGIC: No abnormal bruising or bleeding. ALLERGY: No nasal congestion or hives. Physical Examination     Vitals:    11/15/22 2215 11/16/22 0054 11/16/22 0557 11/16/22 0831   BP: 137/72 118/78 127/76 122/71   Pulse: (!) 105 95 81 87   Resp:  18 18 18   Temp:  97.8 °F (36.6 °C) 98.2 °F (36.8 °C) 98.8 °F (37.1 °C)   TempSrc:  Oral Oral Oral   SpO2:  94% 96%    Weight:       Height:           Wt Readings from Last 3 Encounters:   11/15/22 (!) 305 lb (138.3 kg)       Objective:  General Appearance: In no acute distress. Vital signs: (most recent): Blood pressure 122/71, pulse 87, temperature 98.8 °F (37.1 °C), temperature source Oral, resp. rate 18, height 6' 1\" (1.854 m), weight (!) 305 lb (138.3 kg), SpO2 96 %. Vital signs are normal.  No fever. Output: No urine output. Lungs:  Normal effort and normal respiratory rate. There are decreased breath sounds. Heart: Normal rate. Regular rhythm. S1 normal and S2 normal.    Chest: No chest wall tenderness. Abdomen: Abdomen is soft and distended. Bowel sounds are normal.   There is epigastric tenderness. There is no guarding. Extremities: There is no dependent edema. Neurological: Patient is alert. Skin:  Warm. Labs     Recent Labs     11/15/22  1338 11/15/22  1916 11/16/22  0436   *  --  130*   K 4.1  --  4.2   BUN 33*  --  36*   CREATININE 1.4*  --  1.4*   CL 92*  --  93*   CO2 28  --  25   GLUCOSE 406* 295 394*   CALCIUM 10.1  --  9.7     Recent Labs     11/15/22  1338 11/16/22  0436   WBC 7.6 7.3   HGB 12.9* 12.0*   HCT 39.0* 36.2*    182   MCV 86.9 88.3     No results for input(s): CHOLTOT, TRIG, HDL, CHOLHDL, LDL in the last 72 hours.     Invalid input(s): LIPIDCOMM, VLDCHOL  Recent Labs     11/16/22  0436   APTT 26.9   INR 0.98     Recent Labs     11/15/22  1338 11/15/22  1518   TROPONINI 0.10* 0.10*     Recent Labs     11/15/22  1518   PROBNP 31     No results for input(s): TSH in the last 72 hours. No results for input(s): CHOL, HDL, LDLCALC, TRIG in the last 72 hours.]    Lab Results   Component Value Date    TROPONINI 0.10 (H) 11/15/2022         Imaging     Telemetry:  Patient not on telemetry       Assessment & Plan   Elevated troponin  Demand ischemia   HS troponin x2 0.1 stable and unchanged, likely demand ischemia  No cardiac chest pain   EKG non-ischemic, not suspicious for ACS. ST changes likely from early repolarization. Can discontinue heparin drip  Prior ECHO (7/2022 and 9/2020) do show hypokinesis of basal-mid inferoseptal and basal mid inferior myocardium. No prior heart cath. Patient does, however, have several cardiac risk factors: tobacco use, uncontrolled DM, HLD, HTN. These along with elevated troponin and history of hypokinesis seen on ECHO necessitate rule out of any coronary blockages. Will discuss heart catheterization with patient's  guardian and POA. NPO in preparation for heart cath     Chronic HFmEF  ECHO 7/22 shows EF 45-50%   No prior heart cath    BNP wnl  No evidence of fluid overload  Consider cath to rule out any blockages. HLD  Continue Lipitor     HTN   Continue carvedilol 50 BID  Lasix 20    COPD  History of COPD, no evidence of exacerbation  Good O2 saturation on room air     DM Type II   Uncontrolled, A1C 7/2022 10. Today HgA1c 12.6%  Continue high dose sliding scale insulin  POC glucose checks     VAHE   BUN 36, Cr 1.4  Minimal urine output   Fluids     Thank you for allowing to us to participate in the care or Rosa Davison. Further evaluation will be based upon the patient's clinical course and testing results.     Paolo Score  MS4    Saliam Phipps MD  Internal Medicine, PGY-1    Original note by resident/student was edited based on my personal history and exam of the patient. I have personally reviewed the reports and images of labs, radiological studies, cardiac studies including ECG's and telemetry, current and old medical records. The note was completed using EMR. Every effort was made to ensure accuracy; however, inadvertent computerized transcription errors may be present. Patient is a 39 yo man with cognitive disability, COPD on 2 liters oxygen, asthma, HTN, HFrEF and poorly controlled DM and HLP. Echo 07/2022: mild LVD, mild to mod LVH, EF 45-50%, basal inferior and inferoseptal HK. LVEF similar to echo from 2020. No prior ischemic evaluation. Patient presented to the ED on 11/15 with back pains. He had a fall from his bed 4 days ago. He was admitted for hyperglycemia (Glu 400s), VAHE (Cr 1.4), atypical chest and abdominal pains. Trop elevated at 0.1 x 2. ECG c/w NSR, nonspecific changes. Cardiology was consulted. Assessment and plan:   1. Atypical chest pains. They are reproducible with palpation, suspect musculoskeletal.   2. Elevated troponin. Likely due to demand ischemia (VAHE, severe underlying CAD cannot be excluded in this high risk patient - poorly controlled DM and HLP, smoker, abnormal echo with wall motion abnormalities). 3. DM. Poorly controlled. 4. VAHE. Likely due to glycosuria.       -Plan for C later today  -Cw NPO, heparin drip, asa, coreg 50 bid, lipitor 40  -Would avoid diuretics                   I have personally reviewed the reports and images of labs, radiological studies, cardiac studies including ECG's and telemetry, current and old medical records. The note was completed using EMR and Dragon dictation system. Every effort was made to ensure accuracy; however, inadvertent computerized transcription errors may be present. All questions and concerns were addressed to the patient/family. Alternatives to my treatment were discussed.      I would like to thank you for providing me the opportunity to participate in the care of your patient. If you have any questions, please do not hesitate to contact me.      Elroy Holman MD, [facility], [address]  The [address] [address] [place] [address]  Ph: [phone]  Fax: [phone]

## 2022-11-16 NOTE — PROGRESS NOTES
4 Eyes Admission Assessment     I agree as the admission nurse that 2 RN's have performed a thorough Head to Toe Skin Assessment on the patient. ALL assessment sites listed below have been assessed on admission. Areas assessed by both nurses:   [x]   Head, Face, and Ears   [x]   Shoulders, Back, and Chest  [x]   Arms, Elbows, and Hands   [x]   Coccyx, Sacrum, and Ischium  [x]   Legs, Feet, and Heels        Does the Patient have Skin Breakdown?   No         Keenan Prevention initiated:  Yes   Wound Care Orders initiated:  No      [facility] nurse consulted for Pressure Injury (Stage 3,4, Unstageable, DTI, NWPT, and Complex wounds) or Keenan score 18 or lower:  No      Nurse 1 eSignature: Electronically signed by Elisha Kruse RN on 11/16/22 at 8:16 AM EST    **SHARE this note so that the co-signing nurse is able to place an eSignature**    Nurse 2 eSignature: Electronically signed by Cassidy Anthony RN on 11/17/22 at 9:22 PM EST

## 2022-11-16 NOTE — PLAN OF CARE
Pt started on a heparin drip during shift for elevated troponin. Pt's vital signs normal. Pt's blood sugar has been elevated during shift. Pt has received 20units of lantus and 29 units of lispro. Problem: Safety - Adult  Goal: Free from fall injury  Outcome: Adequate for Discharge  Note: Pt has remained free from falls during shift. Pt ambulates independently without difficulty.

## 2022-11-16 NOTE — PROGRESS NOTES
Progress Note    Admit Date: 11/15/2022  Day: 1  Diet: Diet NPO    CC: Fall    Interval history: Patient was seen and examined this morning at bedside. Patient was resting company in his bed. No acute events overnight. Patient states that chest pain has resolved. He has no other complaints at this time. He remains hemodynamically stable and afebrile. HPI: Christoph San is 38 y.o M with pmhx of asthma, COPD on 2L baseline CHF, dementia, hypertension, cognitive impairment, schizoaffective disorder, type 2 diabetes with a fall. Patient has history of falls and was recently admitted for similar presentation. Patient is from [facility] and reportedly had a fall 2 days ago when he fell off from his bed. He started complaining of back pain today. Patient was not able to verbalize his needs well but he seemed to complain of chest and epigastric pain. In the ED, patient was mildly hyponatremic at 133, creatinine 1.4 baseline seems to be at 1.0, hyperglycemia at 443, troponin 0.1x2, mildly elevated alkaline phosphate 135 and ALT 64. Chest x-ray showed bibasilar atelectasis no pleural effusions or consolidations. EKG showed sinus tachycardia with J-point ST elevations. Patient was given aspirin 325 mg chewable and admitted for further evaluation.     Medications:     Scheduled Meds:   insulin glargine  35 Units SubCUTAneous BID    sodium chloride flush  5-40 mL IntraVENous 2 times per day    atorvastatin  40 mg Oral Nightly    aspirin  81 mg Oral Daily    insulin lispro  0-16 Units SubCUTAneous TID WC    insulin lispro  0-4 Units SubCUTAneous Nightly    divalproex  500 mg Oral TID    furosemide  20 mg Oral Daily    pantoprazole  40 mg Oral Daily    carvedilol  50 mg Oral BID     Continuous Infusions:   heparin (PORCINE) Infusion 11 Units/kg/hr (11/16/22 0732)    sodium chloride      dextrose       PRN Meds:sodium chloride flush, sodium chloride, ondansetron **OR** ondansetron, polyethylene glycol, acetaminophen **OR** acetaminophen, labetalol, heparin (porcine), heparin (porcine), glucose, dextrose bolus **OR** dextrose bolus, glucagon (rDNA), dextrose    Objective:   Vitals:   T-max:  Patient Vitals for the past 8 hrs:   BP Temp Temp src Pulse Resp SpO2   11/16/22 0831 122/71 98.8 °F (37.1 °C) Oral 87 18 --   11/16/22 0557 127/76 98.2 °F (36.8 °C) Oral 81 18 96 %       Intake/Output Summary (Last 24 hours) at 11/16/2022 0958  Last data filed at 11/16/2022 0732  Gross per 24 hour   Intake 2333 ml   Output 0 ml   Net 2333 ml       Review of Systems   Constitutional: Negative. HENT: Negative. Eyes: Negative. Respiratory: Negative. Cardiovascular: Negative. Gastrointestinal: Negative. Genitourinary: Negative. Musculoskeletal: Negative. Skin: Negative. Neurological: Negative. Psychiatric/Behavioral: Negative. Physical Exam  Vitals reviewed. Constitutional:       General: He is awake. Appearance: Normal appearance. He is obese. HENT:      Head: Normocephalic and atraumatic. Eyes:      Extraocular Movements: Extraocular movements intact. Conjunctiva/sclera: Conjunctivae normal.      Pupils: Pupils are equal, round, and reactive to light. Cardiovascular:      Rate and Rhythm: Normal rate and regular rhythm. Heart sounds: Normal heart sounds. Pulmonary:      Effort: Pulmonary effort is normal.      Breath sounds: Normal breath sounds. Abdominal:      General: Abdomen is flat. Bowel sounds are normal. There is distension. Palpations: Abdomen is soft. Musculoskeletal:         General: Normal range of motion. Skin:     General: Skin is warm and dry. Neurological:      General: No focal deficit present. Mental Status: He is alert and oriented to person, place, and time. Mental status is at baseline. Psychiatric:         Mood and Affect: Mood normal.         Behavior: Behavior normal. Behavior is cooperative.        LABS:    CBC:   Recent Labs     11/15/22  1338 11/16/22  0436   WBC 7.6 7.3   HGB 12.9* 12.0*   HCT 39.0* 36.2*    182   MCV 86.9 88.3     Renal:    Recent Labs     11/15/22  1338 11/15/22  1916 11/16/22  0436   *  --  130*   K 4.1  --  4.2   CL 92*  --  93*   CO2 28  --  25   BUN 33*  --  36*   CREATININE 1.4*  --  1.4*   GLUCOSE 406* 295 394*   CALCIUM 10.1  --  9.7   ANIONGAP 13  --  12     Hepatic:   Recent Labs     11/15/22  1338 11/16/22  0436   AST 19 26   ALT 64* 60*   BILITOT <0.2 <0.2   BILIDIR  --  <0.2   PROT 6.8 6.4   LABALBU 4.3 3.9   ALKPHOS 135* 125     Troponin:   Recent Labs     11/15/22  1338 11/15/22  1518   TROPONINI 0.10* 0.10*     BNP: No results for input(s): BNP in the last 72 hours. Lipids:   Recent Labs     11/15/22  1518   CHOL 280*   HDL 35*     ABGs:  No results for input(s): PHART, CBJ8ZXW, PO2ART, REY1WAD, BEART, THGBART, O2CCQTJD, CBP1UMI in the last 72 hours. INR:   Recent Labs     11/16/22 0436   INR 0.98     Lactate: No results for input(s): LACTATE in the last 72 hours. Cultures:  -----------------------------------------------------------------  RAD:   XR CHEST (2 VW)   Final Result      Bibasilar atelectatic changes          Assessment/Plan:   Prateek Perera is 38 y.o M with pmhx of asthma, COPD, CHF, dementia, hypertension, cognitive impairment, schizoaffective disorder, type 2 diabetes with a fall admitted for further evaluation. Chest/epigastric pain  Concern for ACS  Patient seen for complaints of fall of epigastric or chest pain   EKG per ED notes(which was not available in patient room) significant for  J-point ST elevation through precordial leads  -Follow-up repeat EKG  -Troponins elevated at 0.10 X 2, follow-up troponinX3  -Continue aspirin 81 mg daily  -Continue Lipitor 40 mg nightly  -Started on heparin drip  -Consult cardiology for ischemic work-up     VAHE  Patient's creatinine on presentation was 1.4.   His baseline appears to be around 1.1  -Monitor RFP  -Continue gentle IVF      Chronic medical conditions  COPD (not in exacerbation)  T2DM  A1c was 10% in July 2022, follow-up A1c  High-dose sliding scale  PCOT every 4 hours  Hypoglycemia protocol  CHF  Patient does not appear volume overloaded but massive distended abdomen.   Follow-up new echo  Follow-up proBNP  Monitor daily standing weight  Continue Lasix 40 mg twice daily  Monitor daily electrolytes and BMP replace as needed  Essential hypertension  Start carvedilol 25 mg twice daily  Labetalol 10 mg as needed  Schizoaffective disorder  Continue olanzapine 10 mg daily    Code Status: Full code  FEN: Diabetic diet  PPX: Heparin  DISPO: GMF    Michael Méndez DO PGY-1  11/16/22  9:58 AM    This patient has been staffed and discussed with Gallito Jean MD.

## 2022-11-16 NOTE — PROGRESS NOTES
Physical Therapy/Occupational Therapy  Hold note    Referral received, chart reviewed. Per Dr. Kendell Kilpatrick, we will hold therapy evaluations today as pt potentially pending cath lab today. Will f/u 11/17. RN aware.       Inga Watt, PT   Katy Carter  MS, OTR/L #[id number]

## 2022-11-16 NOTE — PROGRESS NOTES
Pharmacy  Note  - Admission Medication History    List of [id number] medications is complete. I reviewed Rx fill history via \"Complete Dispense Report\" in Epic, and received an updated medication list from [facility]. All the medications listed below we added using a medication list provided by [facility].       Current Outpatient Medications   Medication Instructions    acetaminophen (TYLENOL) 975 mg, Oral, EVERY 6 HOURS PRN    aspirin 81 mg, Oral, DAILY    atorvastatin (LIPITOR) 10 mg, Oral, DAILY    benztropine (COGENTIN) 1 mg, Oral, 2 TIMES DAILY    carvedilol (COREG) 50 mg, Oral, 2 TIMES DAILY WITH MEALS    chlorthalidone (HYGROTON) 75 mg, Oral, DAILY    cloNIDine (CATAPRES) 0.3 MG/24HR PTWK 1 patch, TransDERmal, WEEKLY    divalproex (DEPAKOTE SPRINKLE) 500 mg, Oral, 3 TIMES DAILY    fenofibrate micronized (LOFIBRA) 200 mg, Oral, DAILY BEFORE BREAKFAST    ferrous gluconate (FERGON) 324 mg, Oral, DAILY WITH BREAKFAST    furosemide (LASIX) 20 mg, Oral, DAILY    haloperidol (HALDOL) 20 mg, Oral, 3 TIMES DAILY    hydrALAZINE (APRESOLINE) 25 mg, Oral, 3 TIMES DAILY    insulin aspart (NOVOLOG FLEXPEN) 100 UNIT/ML injection pen SubCUTAneous, 3 TIMES DAILY BEFORE MEALS, Inject per sliding scale: if 200-249 inject 4 units, if 250-299 inject 8 units, if 300-349 inject 10 units, if 350-399 inject 12 units    isosorbide mononitrate (IMDUR) 60 mg, Oral, DAILY    metFORMIN (GLUCOPHAGE) 500 mg, Oral, 2 TIMES DAILY WITH MEALS    NIFEdipine (ADALAT CC) 90 mg, Oral, DAILY    omeprazole (PRILOSEC) 20 mg, Oral, DAILY    QUEtiapine (SEROQUEL) 200 mg, Oral, 4 TIMES DAILY    senna (SENOKOT) 8.6 MG tablet 1 tablet, Oral, 2 TIMES DAILY    spironolactone (ALDACTONE) 50 mg, Oral, DAILY    Tresiba FlexTouch 55 Units, SubCUTAneous, DAILY    Trulicity 4.34 mg, SubCUTAneous, WEEKLY    valsartan (DIOVAN) 160 mg, Oral, 2 TIMES DAILY    vitamin B-12 (CYANOCOBALAMIN) 1,000 mcg, Oral, DAILY 11/15/2022 8:27 PM  Jc Perera  PharmD Candidate Class of [employer]

## 2022-11-16 NOTE — ED NOTES
The legal guardian, Eliz Steward [phone], from [address] [place] [address] gave verbal consent to treat via phone.      Lirba Artis RN  11/15/22 [address], RN  11/15/22 0808

## 2022-11-16 NOTE — CARE COORDINATION
CM following. Pt is from [address]. CM spoke with Franko Yadav in admissions who confirmed that pt can return at DC. CM will continue to follow for DC needs and recs.           Electronically signed by CAROLINE Gibson, CHRISTOPHER on 11/16/2022 at 10:56 AM

## 2022-11-16 NOTE — H&P
Internal Medicine  PGY 1  History & Physical      CC fall    History Obtained From:  Charts, patient    HISTORY OF PRESENT ILLNESS:  Harshil Morrell is 38 y.o M with pmhx of asthma, COPD on 2L baseline CHF, dementia, hypertension, cognitive impairment, schizoaffective disorder, type 2 diabetes with a fall. Patient has history of falls and was recently admitted for similar presentation. Patient is from [facility] and reportedly had a fall 2 days ago when he fell off from his bed. He started complaining of back pain today. Patient was not able to verbalize his needs well but he seemed to complain of chest and epigastric pain. In the ED, patient was mildly hyponatremic at 133, creatinine 1.4 baseline seems to be at 1.0, hyperglycemia at 443, troponin 0.1x2, mildly elevated alkaline phosphate 135 and ALT 64. Chest x-ray showed bibasilar atelectasis no pleural effusions or consolidations. EKG showed sinus tachycardia with J-point ST elevations. Patient was given aspirin 325 mg chewable and admitted for further evaluation. Past Medical History:    No past medical history on file. Past Surgical History:    No past surgical history on file. Medications Priorto Admission:    Not in a hospital admission. Allergies:  Patient has no known allergies. Social History:   TOBACCO:   reports that he has been smoking cigarettes. He has never used smokeless tobacco.  ETOH:   reports no history of alcohol use. DRUGS :   Patient currently lives     Family History:   No family history on file. Review of Systems    ROS: A 10 point review of systems was conducted, significant findings as noted in HPI. Physical Exam  Constitutional:       Appearance: He is obese. Cardiovascular:      Rate and Rhythm: Regular rhythm. Tachycardia present.       Heart sounds: Normal heart sounds, S1 normal and S2 normal.   Pulmonary:      Effort: Pulmonary effort is normal.      Breath sounds: Normal breath sounds and air entry.   Abdominal:      General: Abdomen is protuberant. There is distension. Palpations: There is fluid wave. Tenderness: There is abdominal tenderness in the left upper quadrant and left lower quadrant. Musculoskeletal:      Right lower leg: No edema. Left lower leg: No edema. Skin:     Coloration: Skin is pale. Comments: feet   Neurological:      Mental Status: He is alert. Sensory: Sensation is intact. Coordination: Coordination is intact. Psychiatric:         Speech: Speech is tangential.         Behavior: Behavior is cooperative. Physical exam:       Vitals:    11/15/22 1830   BP: 112/74   Pulse: 100   Resp: 26   Temp:    SpO2: 97%       DATA:    Labs:  CBC:   Recent Labs     11/15/22  1338   WBC 7.6   HGB 12.9*   HCT 39.0*          BMP:   Recent Labs     11/15/22  1338   *   K 4.1   CL 92*   CO2 28   BUN 33*   CREATININE 1.4*   GLUCOSE 406*     LFT's:   Recent Labs     11/15/22  1338   AST 19   ALT 64*   BILITOT <0.2   ALKPHOS 135*     Troponin:   Recent Labs     11/15/22  1338 11/15/22  1518   TROPONINI 0.10* 0.10*     BNP:No results for input(s): BNP in the last 72 hours. ABGs: No results for input(s): PHART, NGL7ANM, PO2ART in the last 72 hours. INR: No results for input(s): INR in the last 72 hours. U/A:No results for input(s): NITRITE, COLORU, PHUR, LABCAST, WBCUA, RBCUA, MUCUS, TRICHOMONAS, YEAST, BACTERIA, CLARITYU, SPECGRAV, LEUKOCYTESUR, UROBILINOGEN, BILIRUBINUR, BLOODU, GLUCOSEU, AMORPHOUS in the last 72 hours. Invalid input(s): KETONESU    XR CHEST (2 VW)   Final Result      Bibasilar atelectatic changes              ASSESSMENT AND PLAN:  Lalitha Cason is 38 y.o M with pmhx of asthma, COPD, CHF, dementia, hypertension, cognitive impairment, schizoaffective disorder, type 2 diabetes with a fall admitted for further evaluation.     Chest/epigastric pain  Concern for ACS  Patient seen for complaints of fall of epigastric or chest pain   EKG per ED notes(which was not available in patient room) significant for  J-point ST elevation through precordial leads  Follow-up repeat EKG  Troponins elevated at 0.10 X 2, follow-up troponinX3  Continue aspirin 81 mg daily  Start Lipitor 40 mg nightly  Consult cardiology for ischemic work-up    VAHE  Monitor RFP  Continue gentle IVF    COPD (not in exacerbation)  Patient has no complaint of cough or sputum production  Follow-up respiratory protocol as needed    T2DM  A1c was 10% in July 2022, follow-up A1c  High-dose sliding scale  PCOT every 4 hours  Hypoglycemia protocol    CHF  No echo on file for this patient  Patient does not appear volume overloaded but massive distended abdomen.   Follow-up new echo  Follow-up proBNP  Monitor daily standing weight  Continue Lasix 40 mg twice daily  Monitor daily electrolytes and BMP replace as needed    Essential hypertension  Start carvedilol 25 mg twice daily  Labetalol 10 mg as needed    Schizoaffective disorder  Continue olanzapine 10 mg daily       Will discuss with attending physician Ale Watson MD      Code Status:Full code  FEN: Regular 3 carb Diet   PPX: Lovenox  DISPO: Kaitlyn Campos MD  11/15/2022,  7:04 PM

## 2022-11-16 NOTE — PLAN OF CARE
Problem: Discharge Planning  Goal: Discharge to home or other facility with appropriate resources  Outcome: Progressing     Problem: Safety - Adult  Goal: Free from fall injury  11/16/2022 1045 by Toni Simpson RN  Outcome: Progressing  11/16/2022 0445 by Madhu Chau RN  Outcome: Adequate for Discharge  Note: Pt has remained free from falls during shift. Pt ambulates independently without difficulty.

## 2022-11-17 ENCOUNTER — APPOINTMENT (OUTPATIENT)
Dept: CARDIAC CATH/INVASIVE PROCEDURES | Age: 42
DRG: 287 | End: 2022-11-17
Payer: COMMERCIAL

## 2022-11-17 PROBLEM — R77.8 TROPONIN LEVEL ELEVATED: Status: ACTIVE | Noted: 2022-11-17

## 2022-11-17 LAB
ANION GAP SERPL CALCULATED.3IONS-SCNC: 12 MMOL/L (ref 3–16)
ANTI-XA UNFRAC HEPARIN: 0.25 IU/ML (ref 0.3–0.7)
ANTI-XA UNFRAC HEPARIN: <0.1 IU/ML (ref 0.3–0.7)
BASOPHILS ABSOLUTE: 0 K/UL (ref 0–0.2)
BASOPHILS RELATIVE PERCENT: 0 %
BUN BLDV-MCNC: 27 MG/DL (ref 7–20)
CALCIUM SERPL-MCNC: 9.2 MG/DL (ref 8.3–10.6)
CHLORIDE BLD-SCNC: 92 MMOL/L (ref 99–110)
CO2: 27 MMOL/L (ref 21–32)
CREAT SERPL-MCNC: 1.3 MG/DL (ref 0.9–1.3)
EOSINOPHILS ABSOLUTE: 0 K/UL (ref 0–0.6)
EOSINOPHILS RELATIVE PERCENT: 0 %
GFR SERPL CREATININE-BSD FRML MDRD: >60 ML/MIN/{1.73_M2}
GLUCOSE BLD-MCNC: 303 MG/DL (ref 70–99)
GLUCOSE BLD-MCNC: 331 MG/DL (ref 70–99)
GLUCOSE BLD-MCNC: 370 MG/DL (ref 70–99)
GLUCOSE BLD-MCNC: 372 MG/DL (ref 70–99)
GLUCOSE BLD-MCNC: 395 MG/DL (ref 70–99)
GLUCOSE BLD-MCNC: 408 MG/DL (ref 70–99)
GLUCOSE BLD-MCNC: 417 MG/DL (ref 70–99)
GLUCOSE BLD-MCNC: 484 MG/DL (ref 70–99)
GLUCOSE BLD-MCNC: 504 MG/DL (ref 70–99)
GLUCOSE BLD-MCNC: 577 MG/DL (ref 70–99)
GLUCOSE BLD-MCNC: 584 MG/DL (ref 70–99)
HCT VFR BLD CALC: 36.3 % (ref 40.5–52.5)
HEMOGLOBIN: 11.8 G/DL (ref 13.5–17.5)
LYMPHOCYTES ABSOLUTE: 1.3 K/UL (ref 1–5.1)
LYMPHOCYTES RELATIVE PERCENT: 24 %
MCH RBC QN AUTO: 28.7 PG (ref 26–34)
MCHC RBC AUTO-ENTMCNC: 32.4 G/DL (ref 31–36)
MCV RBC AUTO: 88.5 FL (ref 80–100)
MONOCYTES ABSOLUTE: 0.6 K/UL (ref 0–1.3)
MONOCYTES RELATIVE PERCENT: 11 %
NEUTROPHILS ABSOLUTE: 3.6 K/UL (ref 1.7–7.7)
NEUTROPHILS RELATIVE PERCENT: 65 %
PDW BLD-RTO: 14.9 % (ref 12.4–15.4)
PERFORMED ON: ABNORMAL
PLATELET # BLD: 162 K/UL (ref 135–450)
PMV BLD AUTO: 9.5 FL (ref 5–10.5)
POTASSIUM REFLEX MAGNESIUM: 4.3 MMOL/L (ref 3.5–5.1)
RBC # BLD: 4.1 M/UL (ref 4.2–5.9)
SODIUM BLD-SCNC: 131 MMOL/L (ref 136–145)
WBC # BLD: 5.6 K/UL (ref 4–11)

## 2022-11-17 PROCEDURE — 6370000000 HC RX 637 (ALT 250 FOR IP): Performed by: STUDENT IN AN ORGANIZED HEALTH CARE EDUCATION/TRAINING PROGRAM

## 2022-11-17 PROCEDURE — 85520 HEPARIN ASSAY: CPT

## 2022-11-17 PROCEDURE — B2111ZZ FLUOROSCOPY OF MULTIPLE CORONARY ARTERIES USING LOW OSMOLAR CONTRAST: ICD-10-PCS | Performed by: INTERNAL MEDICINE

## 2022-11-17 PROCEDURE — 6370000000 HC RX 637 (ALT 250 FOR IP)

## 2022-11-17 PROCEDURE — 2709999900 HC NON-CHARGEABLE SUPPLY

## 2022-11-17 PROCEDURE — 4A023N7 MEASUREMENT OF CARDIAC SAMPLING AND PRESSURE, LEFT HEART, PERCUTANEOUS APPROACH: ICD-10-PCS | Performed by: INTERNAL MEDICINE

## 2022-11-17 PROCEDURE — C1769 GUIDE WIRE: HCPCS

## 2022-11-17 PROCEDURE — 6360000002 HC RX W HCPCS

## 2022-11-17 PROCEDURE — 99452 NTRPROF PH1/NTRNET/EHR RFRL: CPT | Performed by: INTERNAL MEDICINE

## 2022-11-17 PROCEDURE — 1200000000 HC SEMI PRIVATE

## 2022-11-17 PROCEDURE — 80048 BASIC METABOLIC PNL TOTAL CA: CPT

## 2022-11-17 PROCEDURE — 2500000003 HC RX 250 WO HCPCS

## 2022-11-17 PROCEDURE — B2151ZZ FLUOROSCOPY OF LEFT HEART USING LOW OSMOLAR CONTRAST: ICD-10-PCS | Performed by: INTERNAL MEDICINE

## 2022-11-17 PROCEDURE — 2580000003 HC RX 258

## 2022-11-17 PROCEDURE — 85025 COMPLETE CBC W/AUTO DIFF WBC: CPT

## 2022-11-17 PROCEDURE — 36415 COLL VENOUS BLD VENIPUNCTURE: CPT

## 2022-11-17 PROCEDURE — C1894 INTRO/SHEATH, NON-LASER: HCPCS

## 2022-11-17 PROCEDURE — 93458 L HRT ARTERY/VENTRICLE ANGIO: CPT

## 2022-11-17 PROCEDURE — 6360000004 HC RX CONTRAST MEDICATION: Performed by: INTERNAL MEDICINE

## 2022-11-17 RX ORDER — INSULIN LISPRO 100 [IU]/ML
10 INJECTION, SOLUTION INTRAVENOUS; SUBCUTANEOUS
Status: DISCONTINUED | OUTPATIENT
Start: 2022-11-17 | End: 2022-11-17

## 2022-11-17 RX ORDER — INSULIN LISPRO 100 [IU]/ML
5 INJECTION, SOLUTION INTRAVENOUS; SUBCUTANEOUS ONCE
Status: COMPLETED | OUTPATIENT
Start: 2022-11-17 | End: 2022-11-17

## 2022-11-17 RX ORDER — INSULIN LISPRO 100 [IU]/ML
15 INJECTION, SOLUTION INTRAVENOUS; SUBCUTANEOUS
Status: DISCONTINUED | OUTPATIENT
Start: 2022-11-17 | End: 2022-11-20

## 2022-11-17 RX ADMIN — HALOPERIDOL 20 MG: 5 TABLET ORAL at 12:58

## 2022-11-17 RX ADMIN — INSULIN LISPRO 4 UNITS: 100 INJECTION, SOLUTION INTRAVENOUS; SUBCUTANEOUS at 20:27

## 2022-11-17 RX ADMIN — DIVALPROEX SODIUM 500 MG: 125 CAPSULE, COATED PELLETS ORAL at 20:37

## 2022-11-17 RX ADMIN — INSULIN HUMAN 10 UNITS: 100 INJECTION, SOLUTION PARENTERAL at 01:32

## 2022-11-17 RX ADMIN — BENZTROPINE MESYLATE 1 MG: 1 TABLET ORAL at 20:37

## 2022-11-17 RX ADMIN — INSULIN GLARGINE 5 UNITS: 100 INJECTION, SOLUTION SUBCUTANEOUS at 13:01

## 2022-11-17 RX ADMIN — SODIUM CHLORIDE, PRESERVATIVE FREE 10 ML: 5 INJECTION INTRAVENOUS at 20:37

## 2022-11-17 RX ADMIN — QUETIAPINE FUMARATE 200 MG: 200 TABLET ORAL at 17:35

## 2022-11-17 RX ADMIN — ASPIRIN 81 MG: 81 TABLET, CHEWABLE ORAL at 11:10

## 2022-11-17 RX ADMIN — INSULIN LISPRO 16 UNITS: 100 INJECTION, SOLUTION INTRAVENOUS; SUBCUTANEOUS at 17:35

## 2022-11-17 RX ADMIN — FENOFIBRATE 160 MG: 160 TABLET ORAL at 11:10

## 2022-11-17 RX ADMIN — HALOPERIDOL 20 MG: 5 TABLET ORAL at 20:38

## 2022-11-17 RX ADMIN — PANTOPRAZOLE SODIUM 40 MG: 40 TABLET, DELAYED RELEASE ORAL at 11:10

## 2022-11-17 RX ADMIN — INSULIN LISPRO 5 UNITS: 100 INJECTION, SOLUTION INTRAVENOUS; SUBCUTANEOUS at 13:01

## 2022-11-17 RX ADMIN — QUETIAPINE FUMARATE 200 MG: 200 TABLET ORAL at 20:38

## 2022-11-17 RX ADMIN — ATORVASTATIN CALCIUM 40 MG: 40 TABLET, FILM COATED ORAL at 20:38

## 2022-11-17 RX ADMIN — INSULIN HUMAN 20 UNITS: 100 INJECTION, SOLUTION PARENTERAL at 15:05

## 2022-11-17 RX ADMIN — CARVEDILOL 50 MG: 25 TABLET, FILM COATED ORAL at 11:14

## 2022-11-17 RX ADMIN — INSULIN LISPRO 10 UNITS: 100 INJECTION, SOLUTION INTRAVENOUS; SUBCUTANEOUS at 11:25

## 2022-11-17 RX ADMIN — IOHEXOL 150 ML: 350 INJECTION, SOLUTION INTRAVENOUS at 08:00

## 2022-11-17 RX ADMIN — INSULIN LISPRO 16 UNITS: 100 INJECTION, SOLUTION INTRAVENOUS; SUBCUTANEOUS at 11:21

## 2022-11-17 RX ADMIN — BENZTROPINE MESYLATE 1 MG: 1 TABLET ORAL at 11:10

## 2022-11-17 RX ADMIN — HEPARIN SODIUM 2000 UNITS: 1000 INJECTION INTRAVENOUS; SUBCUTANEOUS at 02:52

## 2022-11-17 RX ADMIN — QUETIAPINE FUMARATE 200 MG: 200 TABLET ORAL at 12:58

## 2022-11-17 RX ADMIN — INSULIN LISPRO 15 UNITS: 100 INJECTION, SOLUTION INTRAVENOUS; SUBCUTANEOUS at 17:35

## 2022-11-17 RX ADMIN — HEPARIN SODIUM 15 UNITS/KG/HR: 5000 INJECTION, SOLUTION INTRAVENOUS; SUBCUTANEOUS at 00:30

## 2022-11-17 RX ADMIN — DIVALPROEX SODIUM 500 MG: 125 CAPSULE, COATED PELLETS ORAL at 12:58

## 2022-11-17 RX ADMIN — CARVEDILOL 50 MG: 25 TABLET, FILM COATED ORAL at 20:38

## 2022-11-17 NOTE — CARE COORDINATION
CM following. Pt is from 2026 [facility]. CM spoke with Jonathan Chung in admissions who confirmed that pt can return at DC. CM will continue to follow for DC needs and recs.           Electronically signed by CAROLINE Ibarra, BENOITW on 11/17/2022 at 1:05 PM

## 2022-11-17 NOTE — ANESTHESIA PRE-OP
Brief Pre-Op Note/Sedation Assessment      Nancy Bassett  1980  [id number]  8:12 AM    Planned Procedure: Cardiac Catheterization Procedure  Post Procedure Plan: Return to same level of care  Consent: I have discussed with the patient and/or the patient representative the indication, alternatives, and the possible risks and/or complications of the planned procedure and the anesthesia methods. The patient and/or patient representative appear to understand and agree to proceed. Chief Complaint:   Chest Pain/Pressure  Anginal Equivalent  Dyspnea on Exertion  Cardiomyopathy EF 40%    Indications for Cath Procedure:  Presentation:  Suspected CAD  2. Anginal Classification within 2 weeks:  No symptoms  3. Angina Symptoms Assessment:  Atypical Chest Pain  4. Heart Failure Class within last 2 weeks:  No symptoms  5. Cardiovascular Instability:  No    Prior Ischemic Workup/Eval:  Pre-Procedural Medications: Yes: ACE/ARB/ARNI, Beta Blockers, and Ca Channel Blockers  2. Stress Test Completed? No    Does Patient need surgery?   Cath Valve Surgery:  No    Pre-Procedure Medical History:  Vital Signs:  /83   Pulse 78   Temp 97.5 °F (36.4 °C) (Axillary)   Resp 18   Ht 6' 1\" (1.854 m)   Wt (!) 305 lb (138.3 kg)   SpO2 96%   BMI 40.24 kg/m²     Allergies:  No Known Allergies  Medications:    Current Facility-Administered Medications   Medication Dose Route Frequency Provider Last Rate Last Admin    heparin (porcine) 25,000 Units in sodium chloride 0.9 % 250 mL infusion  5-30 Units/kg/hr IntraVENous Continuous Chai Montemayor MD 23.5 mL/hr at 11/17/22 0253 17 Units/kg/hr at 11/17/22 0253    benztropine (COGENTIN) tablet 1 mg  1 mg Oral BID Bettina Arambula MD   1 mg at 11/16/22 2104    fenofibrate (TRIGLIDE) tablet 160 mg  160 mg Oral Daily Bettina Arambula MD   160 mg at 11/16/22 1242    haloperidol (HALDOL) tablet 20 mg  20 mg Oral TID Bettina Arambula MD   20 mg at 11/16/22 2103 QUEtiapine (SEROQUEL) tablet 200 mg  200 mg Oral 4x Daily Ameya Green MD   200 mg at 11/16/22 2103    insulin lispro (1 Unit Dial) (HUMALOG/ADMELOG) pen 5 Units  5 Units SubCUTAneous TID  Ameya Green MD   5 Units at 11/16/22 1752    insulin glargine (LANTUS;BASAGLAR) injection pen 50 Units  50 Units SubCUTAneous BID Ameya Green MD   50 Units at 11/16/22 2058    perflutren lipid microspheres (DEFINITY) injection 1.5 mL  1.5 mL IntraVENous ONCE PRN Vandana Marrufo MD        sodium chloride flush 0.9 % injection 5-40 mL  5-40 mL IntraVENous 2 times per day Rolando Soto MD   10 mL at 11/16/22 2104    sodium chloride flush 0.9 % injection 5-40 mL  5-40 mL IntraVENous PRN Rolando Soto MD        0.9 % sodium chloride infusion   IntraVENous PRN Rolando Soto MD        ondansetron (ZOFRAN-ODT) disintegrating tablet 4 mg  4 mg Oral Q8H PRN Rolando Soto MD        Or    ondansetron [facility]) injection 4 mg  4 mg IntraVENous Q6H PRN Rolando Soto MD        polyethylene glycol (GLYCOLAX) packet 17 g  17 g Oral Daily PRN Rolando Soto MD        acetaminophen (TYLENOL) tablet 650 mg  650 mg Oral Q6H PRN Rolando Soto MD        Or    acetaminophen (TYLENOL) suppository 650 mg  650 mg Rectal Q6H PRN Rolando Soto MD        labetalol (NORMODYNE;TRANDATE) injection 10 mg  10 mg IntraVENous Q4H PRN Rolando Soto MD        atorvastatin (LIPITOR) tablet 40 mg  40 mg Oral Nightly Rolando Soto MD   40 mg at 11/16/22 2103    aspirin chewable tablet 81 mg  81 mg Oral Daily Rolando Soto MD   81 mg at 11/16/22 1763    insulin lispro (1 Unit Dial) (HUMALOG/ADMELOG) pen 0-16 Units  0-16 Units SubCUTAneous TID  Rolando Soto MD   12 Units at 11/16/22 1752    insulin lispro (1 Unit Dial) (HUMALOG/ADMELOG) pen 0-4 Units  0-4 Units SubCUTAneous Nightly Rolando Soto MD   4 Units at 11/16/22 2059    divalproex (DEPAKOTE SPRINKLE) capsule 500 mg  500 mg Oral TID Rolando Soto MD   500 mg at 11/16/22 2103    [Held by provider] furosemide (LASIX) tablet 20 mg  20 mg Oral Daily Porfirio Epps MD   20 mg at 11/16/22 4476    pantoprazole (PROTONIX) tablet 40 mg  40 mg Oral Daily Porfirio Epps MD   40 mg at 11/16/22 5188    heparin (porcine) injection 4,000 Units  4,000 Units IntraVENous PRN Porfirio Epps MD   4,000 Units at 11/16/22 1940    heparin (porcine) injection 2,000 Units  2,000 Units IntraVENous PRN Porfirio Epps MD   2,000 Units at 11/17/22 0252    glucose chewable tablet 16 g  4 tablet Oral PRN Porfirio Epps MD        dextrose bolus 10% 125 mL  125 mL IntraVENous PRN Porfirio Epps MD        Or    dextrose bolus 10% 250 mL  250 mL IntraVENous PRN Porfirio Epps MD        glucagon (rDNA) injection 1 mg  1 mg SubCUTAneous PRN Porfirio Epps MD        dextrose 10 % infusion   IntraVENous Continuous PRN Porfirio Epps MD        carvedilol (COREG) tablet 50 mg  50 mg Oral BID Artist MD Tiffanie   50 mg at 11/16/22 2104       Past Medical History:  No past medical history on file. Surgical History:  No past surgical history on file. Pre-Sedation:  Pre-Sedation Documentation and Exam:  I have personally completed a history, physical exam & review of systems for this patient (see notes). Prior History of Anesthesia Complications:   none    Modified Mallampati:  I (soft palate, uvula, fauces, tonsillar pillars visible)    ASA Classification:  Class 3 - A patient with severe systemic disease that limits activity but is not incapacitating    Zarina Scale: Activity:  2 - Able to move 4 extremities voluntarily on command  Respiration:  2 - Able to breathe deeply and cough freely  Circulation:  2 - BP+/- 20mmHg of normal  Consciousness:  2 - Fully awake  Oxygen Saturation (color):  2 - Able to maintain oxygen saturation >92% on room air    Sedation/Anesthesia Plan:  Guard the patient's safety and welfare. Minimize physical discomfort and pain.   Minimize negative psychological responses to treatment by providing sedation and analgesia and maximize the potential amnesia. Patient to meet pre-procedure discharge plan.     Medication Planned:  midazolam intravenously and fentanyl intravenously    Patient is an appropriate candidate for plan of sedation:   yes      Electronically signed by Veronika Nunez MD on 11/17/2022 at 8:12 AM

## 2022-11-17 NOTE — PROGRESS NOTES
(11/17/22 0253)    sodium chloride      dextrose       PRN Meds:perflutren lipid microspheres, sodium chloride flush, sodium chloride, ondansetron **OR** ondansetron, polyethylene glycol, acetaminophen **OR** acetaminophen, labetalol, heparin (porcine), heparin (porcine), glucose, dextrose bolus **OR** dextrose bolus, glucagon (rDNA), dextrose    Objective:   Vitals:   T-max:  Patient Vitals for the past 8 hrs:   BP Temp Temp src Pulse Resp SpO2 Weight   11/17/22 0520 -- -- -- -- -- -- (!) 305 lb (138.3 kg)   11/17/22 0321 119/83 97.5 °F (36.4 °C) Axillary 78 18 96 % --       Intake/Output Summary (Last 24 hours) at 11/17/2022 0950  Last data filed at 11/17/2022 0520  Gross per 24 hour   Intake 450 ml   Output 0 ml   Net 450 ml       Review of Systems   Unable to perform ROS: Other (Patient was off the floor receiving a left heart cath)     Physical Exam: Patient off the floor receiving left heart cath    LABS:    CBC:   Recent Labs     11/15/22  1338 11/16/22  0436   WBC 7.6 7.3   HGB 12.9* 12.0*   HCT 39.0* 36.2*    182   MCV 86.9 88.3     Renal:    Recent Labs     11/15/22  1338 11/15/22  1916 11/16/22  0436   *  --  130*   K 4.1  --  4.2   CL 92*  --  93*   CO2 28  --  25   BUN 33*  --  36*   CREATININE 1.4*  --  1.4*   GLUCOSE 406* 295 394*   CALCIUM 10.1  --  9.7   ANIONGAP 13  --  12     Hepatic:   Recent Labs     11/15/22  1338 11/16/22  0436   AST 19 26   ALT 64* 60*   BILITOT <0.2 <0.2   BILIDIR  --  <0.2   PROT 6.8 6.4   LABALBU 4.3 3.9   ALKPHOS 135* 125     Troponin:   Recent Labs     11/15/22  1338 11/15/22  1518 11/16/22  1023   TROPONINI 0.10* 0.10* 0.09*     BNP: No results for input(s): BNP in the last 72 hours. Lipids:   Recent Labs     11/15/22  1518   CHOL 280*   HDL 35*     ABGs:  No results for input(s): PHART, ZHB0LFQ, PO2ART, WVX8ADN, BEART, THGBART, S7JVVNJB, EZC8ZXN in the last 72 hours.     INR:   Recent Labs     11/16/22  0436   INR 0.98     Lactate: No results for input(s): LACTATE in the last 72 hours. Cultures:  -----------------------------------------------------------------  RAD:   XR CHEST (2 VW)   Final Result      Bibasilar atelectatic changes          Assessment/Plan:     Eddie Bell is 38 y.o M with pmhx of asthma, COPD, CHF, dementia, hypertension, cognitive impairment, schizoaffective disorder, type 2 diabetes with a fall admitted for further evaluation. Chest/epigastric pain  Concern for ACS  Patient seen for complaints of fall of epigastric or chest pain   EKG per ED notes(which was not available in patient room) significant for  J-point ST elevation through precordial leads  -Troponins elevated at 0.10 X 2, follow-up troponinX3 was 0.09  -Continue aspirin 81 mg daily  -Continue Lipitor 40 mg nightly  -Continue heparin drip  -Cardiology following and will take for left heart cath today      VAHE  Patient's creatinine on presentation was 1.4. His baseline appears to be around 1.1  -Monitor RFP  -Continue gentle IVF        Chronic medical conditions  COPD (not in exacerbation)  T2DM  -A1c was 10% in July 2022, follow-up A1c  -Lantus 50 units BID  -Lispro 10 units 3x daily with meals  -High-dose sliding scale  -PCOT every 4 hours  -Hypoglycemia protocol  CHF  -Patient does not appear volume overloaded but massive distended abdomen.   -2D echo showed ejection fraction estimated to be 50%, intermediate diastolic function, cannot exclude regional wall motion abnormality secondary to poor endocardial visualization.  -Monitor daily standing weight  -Holding lasix   -Monitor daily electrolytes and BMP replace as needed  Essential hypertension  -Start carvedilol 25 mg twice daily  -Labetalol 10 mg as needed  Schizoaffective disorder  -Continue olanzapine 10 mg daily    Code Status:Full code  FEN: Diabetic diet   PPX: Heparin   DISPO: ABDULAZIZ Mo DO PGY-1  11/17/22  9:50 AM    This patient has been staffed and discussed with Tu Alfonso MD.

## 2022-11-17 NOTE — PROGRESS NOTES
EMR was reviewed. LHC 11/17/2022: Luminal irregularities LAD, otherwise normal coronaries, LVEDP 7.     Echo 07/2022: mild LVD, mild to mod LVH, EF 45-50%, basal inferior and inferoseptal HK. LVEF similar to echo from 2020. Assessment and plan:   1. Atypical chest pains. They are reproducible with palpation, suspect musculoskeletal.   2. Elevated troponin. Likely due to demand ischemia (VAHE, severe underlying CAD cannot be excluded in this high risk patient - poorly controlled DM and HLP, smoker, abnormal echo with wall motion abnormalities). LHC reveals overall normal coronaries. 3. DM. Poorly controlled. 4. VAHE. Likely due to glycosuria. 5. Chronic HFrEF. It is well compensated.         -Will stop heparin drip.   -Cw asa, coreg 50 bid, lipitor 40 daily  -Resume home lasix 20 daily tomorrow. - Strict I's and O's every shift and standing weights if possible, low-salt diet, daily BMP with reflex to Mg (correct lytes for goals K >4.0 and Mg > 2.0) and wean supplemental oxygen to off (or down to baseline supplemental oxygen requirements) for sats greater than 92-94%. - Unable to start ACEI, ARB, spironolactone, Entresto or SGLT2 due to CKD. There are no further recommendations at this time and hence we will now sign off. Patient may follow up in our clinic once discharged from the hospital for further cardiac care. Thank you for the consult and please do not hesitate to contact me with any questions.      Randal Serrato MD, [address] [facility], [address]  The [address] [address] [facility] [address]  Ph: [phone]  Fax: [phone]

## 2022-11-17 NOTE — PROCEDURES
The [address] [facility]                                                LEFT HEART CATH    Maranda Cabello   38 y.o., male  1980 11/17/2022    Procedure performed by Dr. Anupama Encinas MD, [facility]  Surgical assistants :none    Procedure  Selective Coronary Angiography  Cardiac Catheterization for Coronary Anatomy  Left Heart Catheterization  Left Ventriculogram  Radial artery access assisted by ultrasound  Arterial Access Right Radial Artery after a negative Vahid test  TR Band    CPT code 43189  CPT code 23081  CPT code 20833    Any and all anesthesia was administered by my staff under my direct supervision and with me personally monitoring the patient    Indication:Cardiac cath to rule out ischemic CAD, Possible angioplasty, The procedure and risks described to patient including risk of CVA, MI, bleeding, emergency surgery, death, asked by Dr. Bren Hester to perform cardiac cath on this patient who has schizoaffective disorder MRDD elevated troponin levels and cardiomyopathy ejection 45% and COPD and diabetes, or Consent signed by POA guardian  Unspecified Angina  Anesthesia: Moderate sedation with Versed and Fentanyl IV was considered. Due to the patient's MRDD issues we were very cognizant about sedation. We monitored him very closely and actually did not have to give him any Versed or fentanyl. He remained very stable and sedate and calm throughout the procedure.   Anesthesia Start time 8:18 AM  Anesthesia end time 8:32 AM  Estimated blood loss :minimal    Specimen removed: NONE    Abnormal Stress Test      Procedure Description:  After written informed consent was obtained, the patient was   brought to the cardiac catheterization suite, where patient was prepped and draped in the usual sterile fashion. Local anesthesia was achieved in the   right wrist with 2% lidocaine. A 5-Marshallese hemostasis sheath was placed into   the radial artery. The pre cocktail of heparin, verapamil and nitroglycerin was injected into the sheath    The JR4 catheter was introduced  to engage the right coronary   artery. Radiographic  images were obtained. The catheter was removed and exchanged over an exchange length 0.35 soft guide wire. .         A 5-Marshallese JL 3.5 catheter was introduced; used to engage the left main   coronary artery. Radiographic  images were obtained. The catheter was pulled back . The JR4 was placed across the aortic valve into the left ventricle where pressure determinations were made. We did not perform an LV gram.  On pullback there was no gradient across the aortic valve. The hemostasis sheath was removed and hemostasis was achieved using a TR Band     There were no complications. Patient tolerated the procedure well. The   patient was transferred to the holding area in stable condition. Contrast consumed 60 cc  Flouro time 4.3 minutes  Radiation exposure 650.12 mGy    Results:  Left ventricular pressure 7 mmHg  Aortic pressure 128/99 mmHg  There was no gradient across the aortic valve. Coronary anatomy:   The left main coronary artery is normal.     Left anterior descending artery i has minimal plaque    Circumflex artery is normal.    The right coronary artery is a dominant vessel and normal.     Left ventriculogram was not performed. There is myopathy based on the echocardiographic 40%. Impression:  Essentially normal coronary arteries. There are no opportunities or need for coronary intervention. LVEDP was 7. Complications: none      Plan:  Primary prevention. We will discuss further with Dr. Chana Baum.   Medical management      This note was likely completed using voice recognition technology and may contain unintended errors  Aspen Jeronimo MD Lelia; Maranda Choi MD

## 2022-11-18 LAB
ANION GAP SERPL CALCULATED.3IONS-SCNC: 8 MMOL/L (ref 3–16)
BASOPHILS ABSOLUTE: 0 K/UL (ref 0–0.2)
BASOPHILS RELATIVE PERCENT: 0 %
BUN BLDV-MCNC: 23 MG/DL (ref 7–20)
CALCIUM SERPL-MCNC: 9.5 MG/DL (ref 8.3–10.6)
CHLORIDE BLD-SCNC: 96 MMOL/L (ref 99–110)
CO2: 30 MMOL/L (ref 21–32)
CORTISOL TOTAL: 23.4 UG/DL
CORTISOL TOTAL: 25.6 UG/DL
CREAT SERPL-MCNC: 1.2 MG/DL (ref 0.9–1.3)
EOSINOPHILS ABSOLUTE: 0 K/UL (ref 0–0.6)
EOSINOPHILS RELATIVE PERCENT: 0 %
GFR SERPL CREATININE-BSD FRML MDRD: >60 ML/MIN/{1.73_M2}
GLUCOSE BLD-MCNC: 270 MG/DL (ref 70–99)
GLUCOSE BLD-MCNC: 273 MG/DL (ref 70–99)
GLUCOSE BLD-MCNC: 287 MG/DL (ref 70–99)
GLUCOSE BLD-MCNC: 300 MG/DL (ref 70–99)
GLUCOSE BLD-MCNC: 328 MG/DL (ref 70–99)
GLUCOSE BLD-MCNC: 329 MG/DL (ref 70–99)
HCT VFR BLD CALC: 34.3 % (ref 40.5–52.5)
HEMOGLOBIN: 11.6 G/DL (ref 13.5–17.5)
LYMPHOCYTES ABSOLUTE: 2.5 K/UL (ref 1–5.1)
LYMPHOCYTES RELATIVE PERCENT: 38 %
MCH RBC QN AUTO: 29 PG (ref 26–34)
MCHC RBC AUTO-ENTMCNC: 33.7 G/DL (ref 31–36)
MCV RBC AUTO: 85.9 FL (ref 80–100)
METAMYELOCYTES RELATIVE PERCENT: 1 %
MONOCYTES ABSOLUTE: 0.7 K/UL (ref 0–1.3)
MONOCYTES RELATIVE PERCENT: 10 %
NEUTROPHILS ABSOLUTE: 3.5 K/UL (ref 1.7–7.7)
NEUTROPHILS RELATIVE PERCENT: 51 %
OVALOCYTES: ABNORMAL
PDW BLD-RTO: 14.5 % (ref 12.4–15.4)
PERFORMED ON: ABNORMAL
PLATELET # BLD: 159 K/UL (ref 135–450)
PMV BLD AUTO: 9.2 FL (ref 5–10.5)
POTASSIUM REFLEX MAGNESIUM: 3.8 MMOL/L (ref 3.5–5.1)
RBC # BLD: 3.99 M/UL (ref 4.2–5.9)
SODIUM BLD-SCNC: 134 MMOL/L (ref 136–145)
TEAR DROP CELLS: ABNORMAL
WBC # BLD: 6.7 K/UL (ref 4–11)

## 2022-11-18 PROCEDURE — 6370000000 HC RX 637 (ALT 250 FOR IP): Performed by: STUDENT IN AN ORGANIZED HEALTH CARE EDUCATION/TRAINING PROGRAM

## 2022-11-18 PROCEDURE — 80048 BASIC METABOLIC PNL TOTAL CA: CPT

## 2022-11-18 PROCEDURE — 6370000000 HC RX 637 (ALT 250 FOR IP): Performed by: INTERNAL MEDICINE

## 2022-11-18 PROCEDURE — 1200000000 HC SEMI PRIVATE

## 2022-11-18 PROCEDURE — 6360000002 HC RX W HCPCS

## 2022-11-18 PROCEDURE — 6370000000 HC RX 637 (ALT 250 FOR IP)

## 2022-11-18 PROCEDURE — 82533 TOTAL CORTISOL: CPT

## 2022-11-18 PROCEDURE — 2580000003 HC RX 258

## 2022-11-18 PROCEDURE — 36415 COLL VENOUS BLD VENIPUNCTURE: CPT

## 2022-11-18 PROCEDURE — 6360000002 HC RX W HCPCS: Performed by: STUDENT IN AN ORGANIZED HEALTH CARE EDUCATION/TRAINING PROGRAM

## 2022-11-18 PROCEDURE — 85025 COMPLETE CBC W/AUTO DIFF WBC: CPT

## 2022-11-18 RX ORDER — ATORVASTATIN CALCIUM 80 MG/1
80 TABLET, FILM COATED ORAL NIGHTLY
Status: DISCONTINUED | OUTPATIENT
Start: 2022-11-18 | End: 2022-11-23 | Stop reason: HOSPADM

## 2022-11-18 RX ORDER — DEXAMETHASONE 4 MG/1
8 TABLET ORAL ONCE
Status: COMPLETED | OUTPATIENT
Start: 2022-11-18 | End: 2022-11-18

## 2022-11-18 RX ORDER — ENOXAPARIN SODIUM 100 MG/ML
30 INJECTION SUBCUTANEOUS 2 TIMES DAILY
Status: DISCONTINUED | OUTPATIENT
Start: 2022-11-18 | End: 2022-11-23 | Stop reason: HOSPADM

## 2022-11-18 RX ORDER — INSULIN LISPRO 100 [IU]/ML
8 INJECTION, SOLUTION INTRAVENOUS; SUBCUTANEOUS ONCE
Status: COMPLETED | OUTPATIENT
Start: 2022-11-18 | End: 2022-11-18

## 2022-11-18 RX ADMIN — DIVALPROEX SODIUM 500 MG: 125 CAPSULE, COATED PELLETS ORAL at 09:17

## 2022-11-18 RX ADMIN — ENOXAPARIN SODIUM 30 MG: 100 INJECTION SUBCUTANEOUS at 09:24

## 2022-11-18 RX ADMIN — ASPIRIN 81 MG: 81 TABLET, CHEWABLE ORAL at 09:18

## 2022-11-18 RX ADMIN — FUROSEMIDE 20 MG: 20 TABLET ORAL at 09:18

## 2022-11-18 RX ADMIN — BENZTROPINE MESYLATE 1 MG: 1 TABLET ORAL at 09:18

## 2022-11-18 RX ADMIN — DEXAMETHASONE 8 MG: 4 TABLET ORAL at 22:49

## 2022-11-18 RX ADMIN — SODIUM CHLORIDE, PRESERVATIVE FREE 10 ML: 5 INJECTION INTRAVENOUS at 09:20

## 2022-11-18 RX ADMIN — INSULIN LISPRO 8 UNITS: 100 INJECTION, SOLUTION INTRAVENOUS; SUBCUTANEOUS at 06:02

## 2022-11-18 RX ADMIN — HALOPERIDOL 20 MG: 5 TABLET ORAL at 13:32

## 2022-11-18 RX ADMIN — ATORVASTATIN CALCIUM 80 MG: 80 TABLET, FILM COATED ORAL at 20:08

## 2022-11-18 RX ADMIN — BENZTROPINE MESYLATE 1 MG: 1 TABLET ORAL at 20:09

## 2022-11-18 RX ADMIN — FENOFIBRATE 160 MG: 160 TABLET ORAL at 09:18

## 2022-11-18 RX ADMIN — QUETIAPINE FUMARATE 200 MG: 200 TABLET ORAL at 20:08

## 2022-11-18 RX ADMIN — INSULIN LISPRO 15 UNITS: 100 INJECTION, SOLUTION INTRAVENOUS; SUBCUTANEOUS at 09:25

## 2022-11-18 RX ADMIN — SODIUM CHLORIDE, PRESERVATIVE FREE 10 ML: 5 INJECTION INTRAVENOUS at 21:44

## 2022-11-18 RX ADMIN — CARVEDILOL 50 MG: 25 TABLET, FILM COATED ORAL at 20:09

## 2022-11-18 RX ADMIN — INSULIN LISPRO 15 UNITS: 100 INJECTION, SOLUTION INTRAVENOUS; SUBCUTANEOUS at 17:52

## 2022-11-18 RX ADMIN — INSULIN LISPRO 8 UNITS: 100 INJECTION, SOLUTION INTRAVENOUS; SUBCUTANEOUS at 09:25

## 2022-11-18 RX ADMIN — PANTOPRAZOLE SODIUM 40 MG: 40 TABLET, DELAYED RELEASE ORAL at 09:18

## 2022-11-18 RX ADMIN — INSULIN LISPRO 8 UNITS: 100 INJECTION, SOLUTION INTRAVENOUS; SUBCUTANEOUS at 13:33

## 2022-11-18 RX ADMIN — CARVEDILOL 50 MG: 25 TABLET, FILM COATED ORAL at 09:17

## 2022-11-18 RX ADMIN — DIVALPROEX SODIUM 500 MG: 125 CAPSULE, COATED PELLETS ORAL at 13:32

## 2022-11-18 RX ADMIN — INSULIN LISPRO 12 UNITS: 100 INJECTION, SOLUTION INTRAVENOUS; SUBCUTANEOUS at 17:52

## 2022-11-18 RX ADMIN — INSULIN GLARGINE 65 UNITS: 100 INJECTION, SOLUTION SUBCUTANEOUS at 20:11

## 2022-11-18 RX ADMIN — QUETIAPINE FUMARATE 200 MG: 200 TABLET ORAL at 13:31

## 2022-11-18 RX ADMIN — ENOXAPARIN SODIUM 30 MG: 100 INJECTION SUBCUTANEOUS at 20:09

## 2022-11-18 RX ADMIN — HALOPERIDOL 20 MG: 5 TABLET ORAL at 20:08

## 2022-11-18 RX ADMIN — DIVALPROEX SODIUM 500 MG: 125 CAPSULE, COATED PELLETS ORAL at 20:08

## 2022-11-18 RX ADMIN — QUETIAPINE FUMARATE 200 MG: 200 TABLET ORAL at 09:18

## 2022-11-18 RX ADMIN — HALOPERIDOL 20 MG: 5 TABLET ORAL at 09:18

## 2022-11-18 RX ADMIN — INSULIN LISPRO 15 UNITS: 100 INJECTION, SOLUTION INTRAVENOUS; SUBCUTANEOUS at 13:33

## 2022-11-18 RX ADMIN — QUETIAPINE FUMARATE 200 MG: 200 TABLET ORAL at 17:51

## 2022-11-18 RX ADMIN — INSULIN GLARGINE 65 UNITS: 100 INJECTION, SOLUTION SUBCUTANEOUS at 09:26

## 2022-11-18 ASSESSMENT — ENCOUNTER SYMPTOMS
RESPIRATORY NEGATIVE: 1
GASTROINTESTINAL NEGATIVE: 1
EYES NEGATIVE: 1

## 2022-11-18 NOTE — PROGRESS NOTES
Pt noncompliant with strict I&O's and diet order. RN educated pt on HF and DM and why his diet is so important. Pt states understanding but does not appear to have evidence of learning. Pt asking multiple times for sandwiches and snacks. RN attempting to offer patient sugar free snacks d/t recent history of hyperglycemia. Pt non complaint. Pt refusing to urinate in hat / urinal at this time. RN educated pt on why we need an accurate urine count.

## 2022-11-18 NOTE — CARE COORDINATION
CM following. Pt is from 20[facility]. CM spoke with Jennifer Polk in admissions who confirmed that pt can return at DC. CM will continue to follow for DC needs and recs.           Electronically signed by CAROLINE Lau, CHRISTOPHER on 11/18/2022 at 1:20 PM

## 2022-11-18 NOTE — PROGRESS NOTES
Progress Note    Admit Date: 11/15/2022  Day: 3  Diet: ADULT DIET; Regular; 3 carb choices (45 gm/meal)    CC: Fall, chest pain    Interval history: Patient was seen and examined this morning at bedside. Patient was resting company in his bed. Overnight patient's glucose levels were in the 500s and this morning came down to the low 300s. Patient likes to eat a lot of snacks and is always asking for more food. He tolerated his left heart cath yesterday without any complications denies any chest pain. He remains hemodynamically stable and afebrile. HPI: Christoph San is 38 y.o M with pmhx of asthma, COPD on 2L baseline CHF, dementia, hypertension, cognitive impairment, schizoaffective disorder, type 2 diabetes with a fall. Patient has history of falls and was recently admitted for similar presentation. Patient is from [facility] and reportedly had a fall 2 days ago when he fell off from his bed. He started complaining of back pain today. Patient was not able to verbalize his needs well but he seemed to complain of chest and epigastric pain. In the ED, patient was mildly hyponatremic at 133, creatinine 1.4 baseline seems to be at 1.0, hyperglycemia at 443, troponin 0.1x2, mildly elevated alkaline phosphate 135 and ALT 64. Chest x-ray showed bibasilar atelectasis no pleural effusions or consolidations. EKG showed sinus tachycardia with J-point ST elevations. Patient was given aspirin 325 mg chewable and admitted for further evaluation.     Medications:     Scheduled Meds:   insulin glargine  65 Units SubCUTAneous BID    insulin lispro  15 Units SubCUTAneous TID     benztropine  1 mg Oral BID    fenofibrate  160 mg Oral Daily    haloperidol  20 mg Oral TID    QUEtiapine  200 mg Oral 4x Daily    sodium chloride flush  5-40 mL IntraVENous 2 times per day    atorvastatin  40 mg Oral Nightly    aspirin  81 mg Oral Daily    insulin lispro  0-16 Units SubCUTAneous TID     insulin lispro  0-4 Units SubCUTAneous Nightly    divalproex  500 mg Oral TID    furosemide  20 mg Oral Daily    pantoprazole  40 mg Oral Daily    carvedilol  50 mg Oral BID     Continuous Infusions:   sodium chloride      dextrose       PRN Meds:perflutren lipid microspheres, sodium chloride flush, sodium chloride, ondansetron **OR** ondansetron, polyethylene glycol, acetaminophen **OR** acetaminophen, labetalol, heparin (porcine), heparin (porcine), glucose, dextrose bolus **OR** dextrose bolus, glucagon (rDNA), dextrose    Objective:   Vitals:   T-max:  Patient Vitals for the past 8 hrs:   BP Temp Temp src Pulse Resp SpO2 Weight   11/18/22 0400 -- -- -- -- -- -- (!) 302 lb 7.5 oz (137.2 kg)   11/18/22 0348 125/85 97.4 °F (36.3 °C) Axillary 69 18 95 % --       Intake/Output Summary (Last 24 hours) at 11/18/2022 0858  Last data filed at 11/17/2022 2352  Gross per 24 hour   Intake 720 ml   Output 350 ml   Net 370 ml       Review of Systems   Constitutional: Negative. HENT: Negative. Eyes: Negative. Respiratory: Negative. Cardiovascular: Negative. Gastrointestinal: Negative. Genitourinary: Negative. Musculoskeletal: Negative. Skin: Negative. Neurological: Negative. Psychiatric/Behavioral: Negative. Physical Exam  Vitals reviewed. Constitutional:       Appearance: Normal appearance. He is morbidly obese. HENT:      Head: Normocephalic and atraumatic. Eyes:      Extraocular Movements: Extraocular movements intact. Conjunctiva/sclera: Conjunctivae normal.      Pupils: Pupils are equal, round, and reactive to light. Cardiovascular:      Rate and Rhythm: Normal rate and regular rhythm. Heart sounds: Normal heart sounds. Pulmonary:      Effort: Pulmonary effort is normal.      Breath sounds: Normal breath sounds. Abdominal:      General: Abdomen is flat. Bowel sounds are normal.      Palpations: Abdomen is soft. Musculoskeletal:         General: Normal range of motion.    Skin: General: Skin is warm and dry. Neurological:      General: No focal deficit present. Mental Status: He is alert and oriented to person, place, and time. Mental status is at baseline. Psychiatric:         Mood and Affect: Mood normal.         Behavior: Behavior normal.       LABS:    CBC:   Recent Labs     11/16/22  0436 11/17/22  1231 11/18/22  0712   WBC 7.3 5.6 6.7   HGB 12.0* 11.8* 11.6*   HCT 36.2* 36.3* 34.3*    162 159   MCV 88.3 88.5 85.9     Renal:    Recent Labs     11/16/22  0436 11/17/22  1231 11/18/22  0712   * 131* 134*   K 4.2 4.3 3.8   CL 93* 92* 96*   CO2 25 27 30   BUN 36* 27* 23*   CREATININE 1.4* 1.3 1.2   GLUCOSE 394* 584* 287*   CALCIUM 9.7 9.2 9.5   ANIONGAP 12 12 8     Hepatic:   Recent Labs     11/15/22  1338 11/16/22  0436   AST 19 26   ALT 64* 60*   BILITOT <0.2 <0.2   BILIDIR  --  <0.2   PROT 6.8 6.4   LABALBU 4.3 3.9   ALKPHOS 135* 125     Troponin:   Recent Labs     11/15/22  1338 11/15/22  1518 11/16/22  1023   TROPONINI 0.10* 0.10* 0.09*     BNP: No results for input(s): BNP in the last 72 hours. Lipids:   Recent Labs     11/15/22  1518   CHOL 280*   HDL 35*     ABGs:  No results for input(s): PHART, CKR3MLF, PO2ART, LVT8YNV, BEART, THGBART, B5GRNHAP, BSZ9AUH in the last 72 hours. INR:   Recent Labs     11/16/22  0436   INR 0.98     Lactate: No results for input(s): LACTATE in the last 72 hours. Cultures:  -----------------------------------------------------------------  RAD:   XR CHEST (2 VW)   Final Result      Bibasilar atelectatic changes          Assessment/Plan:   Riya Aguilar is 38 y.o M with pmhx of asthma, COPD, CHF, dementia, hypertension, cognitive impairment, schizoaffective disorder, type 2 diabetes with a fall admitted for further evaluation.      Chest/epigastric pain  Concern for ACS  Patient seen for complaints of fall of epigastric or chest pain   EKG per ED notes(which was not available in patient room) significant for  J-point ST elevation through precordial leads  -Troponins elevated at 0.10 X 2, follow-up troponinX3 was 0.09  -Continue aspirin 81 mg daily  -Continue Lipitor 40 mg nightly  -Heparin drip d/c'd 11/17  -C showed normal coronary arteries       VAHE  Patient's creatinine on presentation was 1.4. His baseline appears to be around 1.1  -Monitor RFP  -Continue gentle IVF    T2DM  Patient's blood sugars have been in the 300-500 status today. He likes to eat a lot of snacks and is always asking for more food. -A1c was 10% in July 2022, follow-up A1c on 11/15 12.6  -Lantus increased to 65 units BID  -Lispro 10 units 3x daily with meals  -High-dose sliding scale  -PCOT every 4 hours  -Hypoglycemia protocol  -Dietitian consulted        Chronic medical conditions  COPD (not in exacerbation)  CHF  -Patient does not appear volume overloaded but massive distended abdomen.   -2D echo showed ejection fraction estimated to be 50%, intermediate diastolic function, cannot exclude regional wall motion abnormality secondary to poor endocardial visualization.  -Monitor daily standing weight  -Holding lasix   -Monitor daily electrolytes and BMP replace as needed  Essential hypertension  -Start carvedilol 25 mg twice daily  -Labetalol 10 mg as needed  Schizoaffective disorder  -Continue olanzapine 10 mg daily    Code Status: Full code  FEN: Diabetic diet  PPX: Lovenox  DISPO: ABDULAZIZ Lin, DO PGY-1  11/18/22  8:58 AM    This patient has been staffed and discussed with Vashti Cano MD.

## 2022-11-18 NOTE — PLAN OF CARE
Problem: Discharge Planning  Goal: Discharge to home or other facility with appropriate resources  11/17/2022 2200 by Debora Cardona RN  Outcome: Progressing  11/17/2022 2200 by Debora Cardona RN  Outcome: Brandt Daniels (Taken 11/17/2022 2007)  Discharge to home or other facility with appropriate resources: Refer to discharge planning if patient needs post-hospital services based on physician order or complex needs related to functional status, cognitive ability or social support system     Problem: Safety - Adult  Goal: Free from fall injury  11/17/2022 2200 by Debora Cardona RN  Outcome: Progressing  11/17/2022 2200 by Debora Cardona RN  Outcome: Progressing

## 2022-11-18 NOTE — CONSULTS
Nutrition Education    Consult for DM diet education. Pt states that he reads food labels, but could not tell me how many carbs he eats per meal. Pt unable to list foods with carbs. During education pt asked multiple times for me to get him french fries or chips. Explained the importance of not snacking on high carb foods in between meals. Discussed which foods contain carbs, gave pt carb goal per meal, explained the importance of spacing carbs throughout the day. Pt was active during education, but unlikely that he retained information. Pt would benefit from outpatient diabetes educator. RD following per standards of care protocol. Educated on DM diet  Learners: Patient  Readiness: Non-acceptance  Method: Explanation and Handout  Response: No Evidence of Learning  Contact name and number provided.     Miriam Wiley RD, LD  Contact Number: [phone]

## 2022-11-19 ENCOUNTER — APPOINTMENT (OUTPATIENT)
Dept: CT IMAGING | Age: 42
DRG: 287 | End: 2022-11-19
Payer: COMMERCIAL

## 2022-11-19 LAB
ANION GAP SERPL CALCULATED.3IONS-SCNC: 12 MMOL/L (ref 3–16)
BASOPHILS ABSOLUTE: 0 K/UL (ref 0–0.2)
BASOPHILS RELATIVE PERCENT: 0 %
BUN BLDV-MCNC: 24 MG/DL (ref 7–20)
CALCIUM SERPL-MCNC: 9.9 MG/DL (ref 8.3–10.6)
CHLORIDE BLD-SCNC: 89 MMOL/L (ref 99–110)
CO2: 29 MMOL/L (ref 21–32)
CORTISOL TOTAL: 28.4 UG/DL
CREAT SERPL-MCNC: 1.4 MG/DL (ref 0.9–1.3)
EOSINOPHILS ABSOLUTE: 0 K/UL (ref 0–0.6)
EOSINOPHILS RELATIVE PERCENT: 0 %
GFR SERPL CREATININE-BSD FRML MDRD: >60 ML/MIN/{1.73_M2}
GLUCOSE BLD-MCNC: 341 MG/DL (ref 70–99)
GLUCOSE BLD-MCNC: 341 MG/DL (ref 70–99)
GLUCOSE BLD-MCNC: 354 MG/DL (ref 70–99)
GLUCOSE BLD-MCNC: 378 MG/DL (ref 70–99)
GLUCOSE BLD-MCNC: 412 MG/DL (ref 70–99)
GLUCOSE BLD-MCNC: 416 MG/DL (ref 70–99)
GLUCOSE BLD-MCNC: 434 MG/DL (ref 70–99)
HCT VFR BLD CALC: 36.2 % (ref 40.5–52.5)
HEMOGLOBIN: 11.9 G/DL (ref 13.5–17.5)
LYMPHOCYTES ABSOLUTE: 2.3 K/UL (ref 1–5.1)
LYMPHOCYTES RELATIVE PERCENT: 27 %
MCH RBC QN AUTO: 28.7 PG (ref 26–34)
MCHC RBC AUTO-ENTMCNC: 32.9 G/DL (ref 31–36)
MCV RBC AUTO: 87.3 FL (ref 80–100)
MONOCYTES ABSOLUTE: 0.3 K/UL (ref 0–1.3)
MONOCYTES RELATIVE PERCENT: 4 %
MYELOCYTE PERCENT: 1 %
NEUTROPHILS ABSOLUTE: 5.8 K/UL (ref 1.7–7.7)
NEUTROPHILS RELATIVE PERCENT: 68 %
PDW BLD-RTO: 14.7 % (ref 12.4–15.4)
PERFORMED ON: ABNORMAL
PLATELET # BLD: 174 K/UL (ref 135–450)
PMV BLD AUTO: 9.4 FL (ref 5–10.5)
POTASSIUM REFLEX MAGNESIUM: 4.7 MMOL/L (ref 3.5–5.1)
RBC # BLD: 4.14 M/UL (ref 4.2–5.9)
SODIUM BLD-SCNC: 130 MMOL/L (ref 136–145)
TEAR DROP CELLS: ABNORMAL
WBC # BLD: 8.4 K/UL (ref 4–11)

## 2022-11-19 PROCEDURE — 80048 BASIC METABOLIC PNL TOTAL CA: CPT

## 2022-11-19 PROCEDURE — 6370000000 HC RX 637 (ALT 250 FOR IP)

## 2022-11-19 PROCEDURE — 6370000000 HC RX 637 (ALT 250 FOR IP): Performed by: INTERNAL MEDICINE

## 2022-11-19 PROCEDURE — 82533 TOTAL CORTISOL: CPT

## 2022-11-19 PROCEDURE — 6370000000 HC RX 637 (ALT 250 FOR IP): Performed by: STUDENT IN AN ORGANIZED HEALTH CARE EDUCATION/TRAINING PROGRAM

## 2022-11-19 PROCEDURE — 6360000002 HC RX W HCPCS

## 2022-11-19 PROCEDURE — 36415 COLL VENOUS BLD VENIPUNCTURE: CPT

## 2022-11-19 PROCEDURE — 82024 ASSAY OF ACTH: CPT

## 2022-11-19 PROCEDURE — 1200000000 HC SEMI PRIVATE

## 2022-11-19 PROCEDURE — 2500000003 HC RX 250 WO HCPCS

## 2022-11-19 PROCEDURE — 74170 CT ABD WO CNTRST FLWD CNTRST: CPT

## 2022-11-19 PROCEDURE — 85025 COMPLETE CBC W/AUTO DIFF WBC: CPT

## 2022-11-19 PROCEDURE — 2580000003 HC RX 258

## 2022-11-19 PROCEDURE — 6360000004 HC RX CONTRAST MEDICATION

## 2022-11-19 RX ORDER — INSULIN LISPRO 100 [IU]/ML
10 INJECTION, SOLUTION INTRAVENOUS; SUBCUTANEOUS ONCE
Status: COMPLETED | OUTPATIENT
Start: 2022-11-19 | End: 2022-11-19

## 2022-11-19 RX ADMIN — ATORVASTATIN CALCIUM 80 MG: 80 TABLET, FILM COATED ORAL at 21:14

## 2022-11-19 RX ADMIN — INSULIN LISPRO 16 UNITS: 100 INJECTION, SOLUTION INTRAVENOUS; SUBCUTANEOUS at 13:14

## 2022-11-19 RX ADMIN — LABETALOL HYDROCHLORIDE 10 MG: 5 INJECTION INTRAVENOUS at 13:27

## 2022-11-19 RX ADMIN — INSULIN LISPRO 4 UNITS: 100 INJECTION, SOLUTION INTRAVENOUS; SUBCUTANEOUS at 21:24

## 2022-11-19 RX ADMIN — SODIUM CHLORIDE, PRESERVATIVE FREE 10 ML: 5 INJECTION INTRAVENOUS at 08:11

## 2022-11-19 RX ADMIN — INSULIN LISPRO 10 UNITS: 100 INJECTION, SOLUTION INTRAVENOUS; SUBCUTANEOUS at 19:03

## 2022-11-19 RX ADMIN — QUETIAPINE FUMARATE 200 MG: 200 TABLET ORAL at 08:14

## 2022-11-19 RX ADMIN — INSULIN LISPRO 16 UNITS: 100 INJECTION, SOLUTION INTRAVENOUS; SUBCUTANEOUS at 16:27

## 2022-11-19 RX ADMIN — INSULIN LISPRO 15 UNITS: 100 INJECTION, SOLUTION INTRAVENOUS; SUBCUTANEOUS at 08:16

## 2022-11-19 RX ADMIN — QUETIAPINE FUMARATE 200 MG: 200 TABLET ORAL at 13:23

## 2022-11-19 RX ADMIN — BENZTROPINE MESYLATE 1 MG: 1 TABLET ORAL at 21:13

## 2022-11-19 RX ADMIN — INSULIN GLARGINE 65 UNITS: 100 INJECTION, SOLUTION SUBCUTANEOUS at 08:16

## 2022-11-19 RX ADMIN — QUETIAPINE FUMARATE 200 MG: 200 TABLET ORAL at 21:13

## 2022-11-19 RX ADMIN — ENOXAPARIN SODIUM 30 MG: 100 INJECTION SUBCUTANEOUS at 08:10

## 2022-11-19 RX ADMIN — ENOXAPARIN SODIUM 30 MG: 100 INJECTION SUBCUTANEOUS at 21:14

## 2022-11-19 RX ADMIN — ASPIRIN 81 MG: 81 TABLET, CHEWABLE ORAL at 08:08

## 2022-11-19 RX ADMIN — CARVEDILOL 50 MG: 25 TABLET, FILM COATED ORAL at 08:08

## 2022-11-19 RX ADMIN — BENZTROPINE MESYLATE 1 MG: 1 TABLET ORAL at 08:08

## 2022-11-19 RX ADMIN — PANTOPRAZOLE SODIUM 40 MG: 40 TABLET, DELAYED RELEASE ORAL at 08:08

## 2022-11-19 RX ADMIN — IOPAMIDOL 75 ML: 755 INJECTION, SOLUTION INTRAVENOUS at 11:55

## 2022-11-19 RX ADMIN — INSULIN LISPRO 8 UNITS: 100 INJECTION, SOLUTION INTRAVENOUS; SUBCUTANEOUS at 08:16

## 2022-11-19 RX ADMIN — DIVALPROEX SODIUM 500 MG: 125 CAPSULE, COATED PELLETS ORAL at 08:09

## 2022-11-19 RX ADMIN — HALOPERIDOL 20 MG: 5 TABLET ORAL at 21:13

## 2022-11-19 RX ADMIN — DIVALPROEX SODIUM 500 MG: 125 CAPSULE, COATED PELLETS ORAL at 13:24

## 2022-11-19 RX ADMIN — DIVALPROEX SODIUM 500 MG: 125 CAPSULE, COATED PELLETS ORAL at 21:13

## 2022-11-19 RX ADMIN — INSULIN LISPRO 15 UNITS: 100 INJECTION, SOLUTION INTRAVENOUS; SUBCUTANEOUS at 13:15

## 2022-11-19 RX ADMIN — HALOPERIDOL 20 MG: 5 TABLET ORAL at 08:08

## 2022-11-19 RX ADMIN — CARVEDILOL 50 MG: 25 TABLET, FILM COATED ORAL at 21:13

## 2022-11-19 RX ADMIN — FENOFIBRATE 160 MG: 160 TABLET ORAL at 08:08

## 2022-11-19 RX ADMIN — INSULIN LISPRO 15 UNITS: 100 INJECTION, SOLUTION INTRAVENOUS; SUBCUTANEOUS at 16:28

## 2022-11-19 RX ADMIN — INSULIN GLARGINE 65 UNITS: 100 INJECTION, SOLUTION SUBCUTANEOUS at 21:24

## 2022-11-19 RX ADMIN — QUETIAPINE FUMARATE 200 MG: 200 TABLET ORAL at 17:37

## 2022-11-19 RX ADMIN — SODIUM CHLORIDE, PRESERVATIVE FREE 10 ML: 5 INJECTION INTRAVENOUS at 21:30

## 2022-11-19 RX ADMIN — FUROSEMIDE 20 MG: 20 TABLET ORAL at 08:08

## 2022-11-19 RX ADMIN — HALOPERIDOL 20 MG: 5 TABLET ORAL at 13:23

## 2022-11-19 ASSESSMENT — ENCOUNTER SYMPTOMS
RESPIRATORY NEGATIVE: 1
GASTROINTESTINAL NEGATIVE: 1
EYES NEGATIVE: 1

## 2022-11-19 NOTE — PLAN OF CARE
Problem: Discharge Planning  Goal: Discharge to home or other facility with appropriate resources  11/18/2022 2153 by Sophia Tovar RN  Outcome: Progressing  11/18/2022 1806 by Alexis Dillard RN  Outcome: Progressing  11/18/2022 1308 by Alexis Dillard RN  Outcome: Progressing     Problem: Safety - Adult  Goal: Free from fall injury  11/18/2022 2153 by Sophia Tovar RN  Outcome: Progressing  11/18/2022 1806 by Alexis Dillard RN  Outcome: Progressing  11/18/2022 1308 by Alexis Dillard RN  Outcome: Progressing

## 2022-11-19 NOTE — PROGRESS NOTES
Progress Note    Admit Date: 11/15/2022  Day: 4  Diet: ADULT DIET; Regular; 3 carb choices (45 gm/meal)    CC: Fall, chest pain    Interval history: Patient was seen and examined this morning at bedside. Patient was sitting up in chair next to his bed. No acute events overnight. Patient's sugars continue to be in the 300s. Patient is noncompliant with his diet and is always asking for more snacks, food, or milkshakes. He remains hemodynamically stable and afebrile. HPI:  Damon Hand is 38 y.o M with pmhx of asthma, COPD on 2L baseline CHF, dementia, hypertension, cognitive impairment, schizoaffective disorder, type 2 diabetes with a fall. Patient has history of falls and was recently admitted for similar presentation. Patient is from [facility] and reportedly had a fall 2 days ago when he fell off from his bed. He started complaining of back pain today. Patient was not able to verbalize his needs well but he seemed to complain of chest and epigastric pain. In the ED, patient was mildly hyponatremic at 133, creatinine 1.4 baseline seems to be at 1.0, hyperglycemia at 443, troponin 0.1x2, mildly elevated alkaline phosphate 135 and ALT 64. Chest x-ray showed bibasilar atelectasis no pleural effusions or consolidations. EKG showed sinus tachycardia with J-point ST elevations. Patient was given aspirin 325 mg chewable and admitted for further evaluation.     Medications:     Scheduled Meds:   insulin glargine  65 Units SubCUTAneous BID    enoxaparin  30 mg SubCUTAneous BID    atorvastatin  80 mg Oral Nightly    insulin lispro  15 Units SubCUTAneous TID     benztropine  1 mg Oral BID    fenofibrate  160 mg Oral Daily    haloperidol  20 mg Oral TID    QUEtiapine  200 mg Oral 4x Daily    sodium chloride flush  5-40 mL IntraVENous 2 times per day    aspirin  81 mg Oral Daily    insulin lispro  0-16 Units SubCUTAneous TID WC    insulin lispro  0-4 Units SubCUTAneous Nightly    divalproex  500 mg Oral TID    furosemide  20 mg Oral Daily    pantoprazole  40 mg Oral Daily    carvedilol  50 mg Oral BID     Continuous Infusions:   sodium chloride      dextrose       PRN Meds:perflutren lipid microspheres, sodium chloride flush, sodium chloride, ondansetron **OR** ondansetron, polyethylene glycol, acetaminophen **OR** acetaminophen, labetalol, heparin (porcine), heparin (porcine), glucose, dextrose bolus **OR** dextrose bolus, glucagon (rDNA), dextrose    Objective:   Vitals:   T-max:  Patient Vitals for the past 8 hrs:   BP Temp Temp src Pulse Resp SpO2 Weight   11/19/22 0804 (!) 145/107 98 °F (36.7 °C) Oral 66 18 98 % --   11/19/22 0645 -- -- -- -- -- -- 299 lb 14.4 oz (136 kg)   11/19/22 0411 (!) 145/93 97.9 °F (36.6 °C) Oral 63 18 98 % --       Intake/Output Summary (Last 24 hours) at 11/19/2022 0837  Last data filed at 11/19/2022 2513  Gross per 24 hour   Intake 2280 ml   Output --   Net 2280 ml       Review of Systems   Constitutional: Negative. HENT: Negative. Eyes: Negative. Respiratory: Negative. Cardiovascular: Negative. Gastrointestinal: Negative. Genitourinary: Negative. Musculoskeletal: Negative. Skin: Negative. Neurological: Negative. Psychiatric/Behavioral: Negative. Physical Exam  Vitals reviewed. Constitutional:       General: He is awake. Appearance: Normal appearance. He is morbidly obese. HENT:      Head: Normocephalic and atraumatic. Eyes:      Extraocular Movements: Extraocular movements intact. Conjunctiva/sclera: Conjunctivae normal.      Pupils: Pupils are equal, round, and reactive to light. Cardiovascular:      Rate and Rhythm: Normal rate and regular rhythm. Heart sounds: Normal heart sounds. Pulmonary:      Effort: Pulmonary effort is normal.      Breath sounds: Normal breath sounds. Abdominal:      General: Abdomen is flat. Bowel sounds are normal.      Palpations: Abdomen is soft.    Musculoskeletal:         General: Normal range of motion. Skin:     General: Skin is warm and dry. Neurological:      General: No focal deficit present. Mental Status: He is alert and oriented to person, place, and time. Mental status is at baseline. Psychiatric:         Mood and Affect: Mood normal.         Behavior: Behavior normal. Behavior is cooperative. LABS:    CBC:   Recent Labs     11/17/22  1231 11/18/22  0712 11/19/22  0810   WBC 5.6 6.7 8.4   HGB 11.8* 11.6* 11.9*   HCT 36.3* 34.3* 36.2*    159 174   MCV 88.5 85.9 87.3     Renal:    Recent Labs     11/17/22  1231 11/18/22  0712   * 134*   K 4.3 3.8   CL 92* 96*   CO2 27 30   BUN 27* 23*   CREATININE 1.3 1.2   GLUCOSE 584* 287*   CALCIUM 9.2 9.5   ANIONGAP 12 8     Hepatic: No results for input(s): AST, ALT, BILITOT, BILIDIR, PROT, LABALBU, ALKPHOS in the last 72 hours. Troponin:   Recent Labs     11/16/22  1023   TROPONINI 0.09*     BNP: No results for input(s): BNP in the last 72 hours. Lipids: No results for input(s): CHOL, HDL in the last 72 hours. Invalid input(s): LDLCALCU, TRIGLYCERIDE  ABGs:  No results for input(s): PHART, LSA8JSL, PO2ART, APG2JFP, BEART, THGBART, Z0CQGSHU, YEL6JIA in the last 72 hours. INR: No results for input(s): INR in the last 72 hours. Lactate: No results for input(s): LACTATE in the last 72 hours. Cultures:  -----------------------------------------------------------------  RAD:   XR CHEST (2 VW)   Final Result      Bibasilar atelectatic changes          Assessment/Plan:   Sona Parmar is 38 y.o M with pmhx of asthma, COPD, CHF, dementia, hypertension, cognitive impairment, schizoaffective disorder, type 2 diabetes with a fall admitted for further evaluation.      Chest/epigastric pain  Concern for ACS  Patient seen for complaints of fall of epigastric or chest pain   EKG per ED notes(which was not available in patient room) significant for  J-point ST elevation through precordial leads  -Troponins elevated at 0.10 X 2, follow-up troponinX3 was 0.09  -Continue aspirin 81 mg daily  -Continue Lipitor 40 mg nightly  -Heparin drip d/c'd 11/17  -LHC showed normal coronary arteries       VAHE-resolved  Patient's creatinine on presentation was 1.4. His baseline appears to be around 1.1  -Monitor RFP  -Continue gentle IVF     T2DM  Patient's blood sugars have been in the 300-500 status today. He likes to eat a lot of snacks and is always asking for more food. -A1c was 10% in July 2022, follow-up A1c on 11/15 12.6  -Lantus increased to 65 units BID  -Lispro 15 units 3x daily with meals  -High-dose sliding scale  -PCOT every 4 hours  -Hypoglycemia protocol  -Dietitian consulted        Chronic medical conditions  COPD (not in exacerbation)  CHF  -Patient does not appear volume overloaded but massive distended abdomen.   -2D echo showed ejection fraction estimated to be 50%, intermediate diastolic function, cannot exclude regional wall motion abnormality secondary to poor endocardial visualization.  -Monitor daily standing weight  -Holding lasix   -Monitor daily electrolytes and BMP replace as needed  Essential hypertension  -Start carvedilol 25 mg twice daily  -Labetalol 10 mg as needed  Schizoaffective disorder  -Continue olanzapine 10 mg daily    Code Status: Full code  FEN: Diabetic diet  PPX: Lovenox  DISPO: GMF Dolan Paget, DO PGY-1  11/19/22  8:37 AM    This patient has been staffed and discussed with Alvin Luis MD.

## 2022-11-19 NOTE — PROGRESS NOTES
Patient alert and oriented. Vss. Patient non compliant with diet. No c/o pain. Up ad cindy with out difficulties.

## 2022-11-20 ENCOUNTER — APPOINTMENT (OUTPATIENT)
Dept: CT IMAGING | Age: 42
DRG: 287 | End: 2022-11-20
Payer: COMMERCIAL

## 2022-11-20 LAB
ANION GAP SERPL CALCULATED.3IONS-SCNC: 10 MMOL/L (ref 3–16)
BANDED NEUTROPHILS RELATIVE PERCENT: 5 % (ref 0–7)
BASOPHILS ABSOLUTE: 0 K/UL (ref 0–0.2)
BASOPHILS RELATIVE PERCENT: 0 %
BUN BLDV-MCNC: 20 MG/DL (ref 7–20)
CALCIUM SERPL-MCNC: 9.5 MG/DL (ref 8.3–10.6)
CHLORIDE BLD-SCNC: 97 MMOL/L (ref 99–110)
CO2: 30 MMOL/L (ref 21–32)
CREAT SERPL-MCNC: 1.3 MG/DL (ref 0.9–1.3)
EOSINOPHILS ABSOLUTE: 0 K/UL (ref 0–0.6)
EOSINOPHILS RELATIVE PERCENT: 0 %
GFR SERPL CREATININE-BSD FRML MDRD: >60 ML/MIN/{1.73_M2}
GLUCOSE BLD-MCNC: 187 MG/DL (ref 70–99)
GLUCOSE BLD-MCNC: 326 MG/DL (ref 70–99)
GLUCOSE BLD-MCNC: 421 MG/DL (ref 70–99)
HCT VFR BLD CALC: 35.2 % (ref 40.5–52.5)
HEMOGLOBIN: 11.5 G/DL (ref 13.5–17.5)
LYMPHOCYTES ABSOLUTE: 2.2 K/UL (ref 1–5.1)
LYMPHOCYTES RELATIVE PERCENT: 28 %
MAGNESIUM: 2 MG/DL (ref 1.8–2.4)
MCH RBC QN AUTO: 28.6 PG (ref 26–34)
MCHC RBC AUTO-ENTMCNC: 32.6 G/DL (ref 31–36)
MCV RBC AUTO: 87.7 FL (ref 80–100)
MONOCYTES ABSOLUTE: 1.7 K/UL (ref 0–1.3)
MONOCYTES RELATIVE PERCENT: 22 %
NEUTROPHILS ABSOLUTE: 4 K/UL (ref 1.7–7.7)
NEUTROPHILS RELATIVE PERCENT: 45 %
NUCLEATED RED BLOOD CELLS: 2 /100 WBC
PDW BLD-RTO: 14.7 % (ref 12.4–15.4)
PERFORMED ON: ABNORMAL
PERFORMED ON: ABNORMAL
PLATELET # BLD: 154 K/UL (ref 135–450)
PMV BLD AUTO: 9.3 FL (ref 5–10.5)
POTASSIUM REFLEX MAGNESIUM: 3.4 MMOL/L (ref 3.5–5.1)
RBC # BLD: 4.02 M/UL (ref 4.2–5.9)
SODIUM BLD-SCNC: 137 MMOL/L (ref 136–145)
WBC # BLD: 7.9 K/UL (ref 4–11)

## 2022-11-20 PROCEDURE — 6370000000 HC RX 637 (ALT 250 FOR IP): Performed by: STUDENT IN AN ORGANIZED HEALTH CARE EDUCATION/TRAINING PROGRAM

## 2022-11-20 PROCEDURE — 6370000000 HC RX 637 (ALT 250 FOR IP)

## 2022-11-20 PROCEDURE — 2580000003 HC RX 258

## 2022-11-20 PROCEDURE — 6370000000 HC RX 637 (ALT 250 FOR IP): Performed by: INTERNAL MEDICINE

## 2022-11-20 PROCEDURE — 6360000002 HC RX W HCPCS

## 2022-11-20 PROCEDURE — 70450 CT HEAD/BRAIN W/O DYE: CPT

## 2022-11-20 PROCEDURE — 1200000000 HC SEMI PRIVATE

## 2022-11-20 PROCEDURE — 99223 1ST HOSP IP/OBS HIGH 75: CPT | Performed by: INTERNAL MEDICINE

## 2022-11-20 PROCEDURE — 36415 COLL VENOUS BLD VENIPUNCTURE: CPT

## 2022-11-20 PROCEDURE — 80048 BASIC METABOLIC PNL TOTAL CA: CPT

## 2022-11-20 PROCEDURE — 83735 ASSAY OF MAGNESIUM: CPT

## 2022-11-20 PROCEDURE — 85025 COMPLETE CBC W/AUTO DIFF WBC: CPT

## 2022-11-20 RX ORDER — INSULIN LISPRO 100 [IU]/ML
20 INJECTION, SOLUTION INTRAVENOUS; SUBCUTANEOUS
Status: DISCONTINUED | OUTPATIENT
Start: 2022-11-20 | End: 2022-11-20

## 2022-11-20 RX ORDER — INSULIN LISPRO 100 [IU]/ML
15 INJECTION, SOLUTION INTRAVENOUS; SUBCUTANEOUS
Status: DISCONTINUED | OUTPATIENT
Start: 2022-11-20 | End: 2022-11-23 | Stop reason: HOSPADM

## 2022-11-20 RX ADMIN — INSULIN LISPRO 16 UNITS: 100 INJECTION, SOLUTION INTRAVENOUS; SUBCUTANEOUS at 17:36

## 2022-11-20 RX ADMIN — QUETIAPINE FUMARATE 200 MG: 200 TABLET ORAL at 17:44

## 2022-11-20 RX ADMIN — QUETIAPINE FUMARATE 200 MG: 200 TABLET ORAL at 21:09

## 2022-11-20 RX ADMIN — CARVEDILOL 50 MG: 25 TABLET, FILM COATED ORAL at 08:43

## 2022-11-20 RX ADMIN — INSULIN GLARGINE 65 UNITS: 100 INJECTION, SOLUTION SUBCUTANEOUS at 21:27

## 2022-11-20 RX ADMIN — INSULIN LISPRO 15 UNITS: 100 INJECTION, SOLUTION INTRAVENOUS; SUBCUTANEOUS at 08:35

## 2022-11-20 RX ADMIN — QUETIAPINE FUMARATE 200 MG: 200 TABLET ORAL at 08:43

## 2022-11-20 RX ADMIN — INSULIN LISPRO 4 UNITS: 100 INJECTION, SOLUTION INTRAVENOUS; SUBCUTANEOUS at 21:15

## 2022-11-20 RX ADMIN — ASPIRIN 81 MG: 81 TABLET, CHEWABLE ORAL at 08:43

## 2022-11-20 RX ADMIN — DIVALPROEX SODIUM 500 MG: 125 CAPSULE, COATED PELLETS ORAL at 08:43

## 2022-11-20 RX ADMIN — HALOPERIDOL 20 MG: 5 TABLET ORAL at 21:10

## 2022-11-20 RX ADMIN — PANTOPRAZOLE SODIUM 40 MG: 40 TABLET, DELAYED RELEASE ORAL at 08:43

## 2022-11-20 RX ADMIN — SODIUM CHLORIDE, PRESERVATIVE FREE 10 ML: 5 INJECTION INTRAVENOUS at 08:43

## 2022-11-20 RX ADMIN — INSULIN LISPRO 15 UNITS: 100 INJECTION, SOLUTION INTRAVENOUS; SUBCUTANEOUS at 17:37

## 2022-11-20 RX ADMIN — BENZTROPINE MESYLATE 1 MG: 1 TABLET ORAL at 08:43

## 2022-11-20 RX ADMIN — POTASSIUM BICARBONATE 40 MEQ: 782 TABLET, EFFERVESCENT ORAL at 14:14

## 2022-11-20 RX ADMIN — ATORVASTATIN CALCIUM 80 MG: 80 TABLET, FILM COATED ORAL at 21:10

## 2022-11-20 RX ADMIN — SODIUM CHLORIDE, PRESERVATIVE FREE 10 ML: 5 INJECTION INTRAVENOUS at 21:13

## 2022-11-20 RX ADMIN — ENOXAPARIN SODIUM 30 MG: 100 INJECTION SUBCUTANEOUS at 21:11

## 2022-11-20 RX ADMIN — INSULIN GLARGINE 65 UNITS: 100 INJECTION, SOLUTION SUBCUTANEOUS at 08:34

## 2022-11-20 RX ADMIN — HALOPERIDOL 20 MG: 5 TABLET ORAL at 14:07

## 2022-11-20 RX ADMIN — QUETIAPINE FUMARATE 200 MG: 200 TABLET ORAL at 14:07

## 2022-11-20 RX ADMIN — FENOFIBRATE 160 MG: 160 TABLET ORAL at 08:43

## 2022-11-20 RX ADMIN — CARVEDILOL 50 MG: 25 TABLET, FILM COATED ORAL at 21:09

## 2022-11-20 RX ADMIN — ENOXAPARIN SODIUM 30 MG: 100 INJECTION SUBCUTANEOUS at 08:44

## 2022-11-20 RX ADMIN — BENZTROPINE MESYLATE 1 MG: 1 TABLET ORAL at 21:09

## 2022-11-20 RX ADMIN — FUROSEMIDE 20 MG: 20 TABLET ORAL at 08:43

## 2022-11-20 RX ADMIN — PANTOPRAZOLE SODIUM 40 MG: 40 TABLET, DELAYED RELEASE ORAL at 14:07

## 2022-11-20 RX ADMIN — DIVALPROEX SODIUM 500 MG: 125 CAPSULE, COATED PELLETS ORAL at 21:09

## 2022-11-20 RX ADMIN — HALOPERIDOL 20 MG: 5 TABLET ORAL at 08:43

## 2022-11-20 RX ADMIN — DIVALPROEX SODIUM 500 MG: 125 CAPSULE, COATED PELLETS ORAL at 14:07

## 2022-11-20 NOTE — PROGRESS NOTES
Progress Note    Admit Date: 11/15/2022  Day: 6  Diet: ADULT DIET; Regular; 3 carb choices (45 gm/meal)    CC: Fall, chest pain    Interval history:   Patient reports feeling well today. Denies CP, SOB, N/V, fever or chills. Reports feeling back to baseline. HPI:  Nancy Zheng is 38 y.o M with pmhx of asthma, COPD on 2L baseline CHF, dementia, hypertension, cognitive impairment, schizoaffective disorder, type 2 diabetes with a fall. Patient has history of falls and was recently admitted for similar presentation. Patient is from [facility] and reportedly had a fall 2 days ago when he fell off from his bed. He started complaining of back pain today. Patient was not able to verbalize his needs well but he seemed to complain of chest and epigastric pain. In the ED, patient was mildly hyponatremic at 133, creatinine 1.4 baseline seems to be at 1.0, hyperglycemia at 443, troponin 0.1x2, mildly elevated alkaline phosphate 135 and ALT 64. Chest x-ray showed bibasilar atelectasis no pleural effusions or consolidations. EKG showed sinus tachycardia with J-point ST elevations. Patient was given aspirin 325 mg chewable and admitted for further evaluation.     Medications:     Scheduled Meds:   potassium bicarb-citric acid  40 mEq Oral Once    insulin lispro  15 Units SubCUTAneous TID WC    insulin glargine  65 Units SubCUTAneous BID    enoxaparin  30 mg SubCUTAneous BID    atorvastatin  80 mg Oral Nightly    benztropine  1 mg Oral BID    fenofibrate  160 mg Oral Daily    haloperidol  20 mg Oral TID    QUEtiapine  200 mg Oral 4x Daily    sodium chloride flush  5-40 mL IntraVENous 2 times per day    aspirin  81 mg Oral Daily    insulin lispro  0-16 Units SubCUTAneous TID WC    insulin lispro  0-4 Units SubCUTAneous Nightly    divalproex  500 mg Oral TID    furosemide  20 mg Oral Daily    pantoprazole  40 mg Oral Daily    carvedilol  50 mg Oral BID     Continuous Infusions:   sodium chloride      dextrose PRN Meds:perflutren lipid microspheres, sodium chloride flush, sodium chloride, ondansetron **OR** ondansetron, polyethylene glycol, acetaminophen **OR** acetaminophen, labetalol, heparin (porcine), heparin (porcine), glucose, dextrose bolus **OR** dextrose bolus, glucagon (rDNA), dextrose    Objective:   Vitals:   T-max:  Patient Vitals for the past 8 hrs:   BP Temp Temp src Pulse Resp SpO2   11/20/22 1402 123/77 97.9 °F (36.6 °C) Oral -- 18 95 %   11/20/22 0839 (!) 134/93 98.3 °F (36.8 °C) Oral 94 18 94 %       Intake/Output Summary (Last 24 hours) at 11/20/2022 1406  Last data filed at 11/19/2022 2320  Gross per 24 hour   Intake 1000 ml   Output --   Net 1000 ml       Review of Systems   Constitutional: Negative. HENT: Negative. Eyes: Negative. Respiratory: Negative. Cardiovascular: Negative. Gastrointestinal: Negative. Genitourinary: Negative. Musculoskeletal: Negative. Skin: Negative. Neurological: Negative. Psychiatric/Behavioral: Negative. Physical Exam  Vitals reviewed. Constitutional:       General: He is awake. Appearance: Normal appearance. He is morbidly obese. HENT:      Head: Normocephalic and atraumatic. Eyes:      Extraocular Movements: Extraocular movements intact. Conjunctiva/sclera: Conjunctivae normal.      Pupils: Pupils are equal, round, and reactive to light. Cardiovascular:      Rate and Rhythm: Normal rate and regular rhythm. Heart sounds: Normal heart sounds. Pulmonary:      Effort: Pulmonary effort is normal.      Breath sounds: Normal breath sounds. Abdominal:      General: Abdomen is flat. Bowel sounds are normal.      Palpations: Abdomen is soft. Musculoskeletal:         General: Normal range of motion. Skin:     General: Skin is warm and dry. Neurological:      General: No focal deficit present. Mental Status: He is alert and oriented to person, place, and time. Mental status is at baseline.    Psychiatric: Mood and Affect: Mood normal.         Behavior: Behavior normal. Behavior is cooperative. LABS:    CBC:   Recent Labs     11/18/22  0712 11/19/22  0810 11/20/22  0714   WBC 6.7 8.4 7.9   HGB 11.6* 11.9* 11.5*   HCT 34.3* 36.2* 35.2*    174 154   MCV 85.9 87.3 87.7     Renal:    Recent Labs     11/18/22  0712 11/19/22  0810 11/20/22  0714   * 130* 137   K 3.8 4.7 3.4*   CL 96* 89* 97*   CO2 30 29 30   BUN 23* 24* 20   CREATININE 1.2 1.4* 1.3   GLUCOSE 287* 341* 187*   CALCIUM 9.5 9.9 9.5   MG  --   --  2.00   ANIONGAP 8 12 10     Hepatic: No results for input(s): AST, ALT, BILITOT, BILIDIR, PROT, LABALBU, ALKPHOS in the last 72 hours. Troponin:   No results for input(s): TROPONINI in the last 72 hours. BNP: No results for input(s): BNP in the last 72 hours. Lipids: No results for input(s): CHOL, HDL in the last 72 hours. Invalid input(s): LDLCALCU, TRIGLYCERIDE  ABGs:  No results for input(s): PHART, NGA8BYG, PO2ART, VDN9QKE, BEART, THGBART, H6TNPRWI, NQX7CWI in the last 72 hours. INR: No results for input(s): INR in the last 72 hours. Lactate: No results for input(s): LACTATE in the last 72 hours. Cultures:  -----------------------------------------------------------------  RAD:   CT HEAD WO CONTRAST   Final Result   1. No evidence for acute intracranial process. No bleed or shift   2. Paranasal sinus disease, there are areas of increased density noted within this disease process for which I cannot exclude a fungal etiology   3. Frontal temporal atrophy      CT ADRENALS W WO CONTRAST   Final Result      1. Diffuse symmetric nodular thickening of the adrenal glands bilaterally with the right adrenal gland measuring 7.2 cm in length and the left adrenal gland measuring 6.6 cm in length. The CT appearance is most consistent with diffuse nodular adrenal    hyperplasia rather than an adrenal mass. CT follow-up could be considered. 2. Diffuse fatty infiltration of liver.       XR CHEST (2 VW)   Final Result      Bibasilar atelectatic changes          Assessment/Plan:   Bob Avila is 38 y.o M with pmhx of asthma, COPD, CHF, dementia, hypertension, cognitive impairment, schizoaffective disorder, type 2 diabetes with a fall admitted for further evaluation. Chest/epigastric pain  Concern for ACS  Patient seen for complaints of fall of epigastric or chest pain   EKG per ED notes(which was not available in patient room) significant for  J-point ST elevation through precordial leads  -Troponins elevated at 0.10 X 2, follow-up troponinX3 was 0.09  -Continue aspirin 81 mg daily  -Continue Lipitor 80 mg nightly  -Heparin drip d/c'd 11/17  -LHC showed normal coronary arteries       VAHE-resolved  Patient's creatinine on presentation was 1.4. His baseline appears to be around 1.1  -Monitor RFP     T2DM  Patient's blood sugars have been in the 300-500 status today. He likes to eat a lot of snacks and is always asking for more food. -A1c was 10% in July 2022, follow-up A1c on 11/15 12.6  -Lantus 65 U BID  - Lispro 15 units 3x daily with meals  -High-dose sliding scale  -PCOT every 4 hours  -Hypoglycemia protocol  -Dietitian consulted        Chronic medical conditions  COPD (not in exacerbation)  CHF  -Patient does not appear volume overloaded but massive distended abdomen. -2D echo showed ejection fraction estimated to be 50%, intermediate diastolic function, cannot exclude regional wall motion abnormality secondary to poor endocardial visualization.  -Monitor daily standing weight  -20mg PO lasix daily  -Monitor daily electrolytes and BMP replace as needed  Essential hypertension  -Carvedilol 50 mg twice daily  -Labetalol 10 mg as needed  Schizoaffective disorder  -Holding home olanzapine.  Continue benztropine, depakote, haldol, seroquel    Code Status: Full code  FEN: Diabetic diet  PPX: Lovenox  DISPO: [facility]    Zainab Anna MD PGY-3  11/20/22  2:06 PM    This patient has been staffed and discussed with Romero Sylvester MD.

## 2022-11-20 NOTE — PLAN OF CARE
Problem: Discharge Planning  Goal: Discharge to home or other facility with appropriate resources  11/19/2022 2159 by Kody Miles RN  Outcome: Progressing  11/19/2022 0956 by Kezia Ivey RN  Outcome: Progressing  Flowsheets (Taken 11/19/2022 0804)  Discharge to home or other facility with appropriate resources: Identify barriers to discharge with patient and caregiver     Problem: Safety - Adult  Goal: Free from fall injury  11/19/2022 2159 by Kody Miles RN  Outcome: Progressing  11/19/2022 0956 by Kezia Ivey RN  Outcome: Progressing

## 2022-11-20 NOTE — PROGRESS NOTES
Patient found on floor. No injury noted. Charge nurse, Unit Manager and physician aware. Sine no evidence of hitting head, CT without contrast ordered. Results are negative. Patient non compliant with meals and snacks.  Laying in bed with eyes closed

## 2022-11-20 NOTE — PROGRESS NOTES
Current BS of 421 is due to patient ate earlier at unknown time without getting BS or scheduled insulin coverage. Cont to monitor closely and education given.

## 2022-11-21 ENCOUNTER — APPOINTMENT (OUTPATIENT)
Dept: CT IMAGING | Age: 42
DRG: 287 | End: 2022-11-21
Payer: COMMERCIAL

## 2022-11-21 ENCOUNTER — APPOINTMENT (OUTPATIENT)
Dept: GENERAL RADIOLOGY | Age: 42
DRG: 287 | End: 2022-11-21
Payer: COMMERCIAL

## 2022-11-21 PROBLEM — R73.9 HYPERGLYCEMIA: Status: ACTIVE | Noted: 2022-11-21

## 2022-11-21 PROBLEM — E66.9 OBESITY: Status: ACTIVE | Noted: 2022-11-21

## 2022-11-21 PROBLEM — R68.89 CUSHINGOID FACIES: Status: ACTIVE | Noted: 2022-11-21

## 2022-11-21 PROBLEM — E87.8 ELECTROLYTE IMBALANCE: Status: ACTIVE | Noted: 2022-11-21

## 2022-11-21 LAB
ANION GAP SERPL CALCULATED.3IONS-SCNC: 9 MMOL/L (ref 3–16)
ATYPICAL LYMPHOCYTE RELATIVE PERCENT: 8 % (ref 0–6)
BANDED NEUTROPHILS RELATIVE PERCENT: 3 % (ref 0–7)
BASOPHILS ABSOLUTE: 0.1 K/UL (ref 0–0.2)
BASOPHILS RELATIVE PERCENT: 1 %
BUN BLDV-MCNC: 15 MG/DL (ref 7–20)
CALCIUM SERPL-MCNC: 9.2 MG/DL (ref 8.3–10.6)
CHLORIDE BLD-SCNC: 94 MMOL/L (ref 99–110)
CO2: 30 MMOL/L (ref 21–32)
CREAT SERPL-MCNC: 1.2 MG/DL (ref 0.9–1.3)
EOSINOPHILS ABSOLUTE: 0 K/UL (ref 0–0.6)
EOSINOPHILS RELATIVE PERCENT: 0 %
GFR SERPL CREATININE-BSD FRML MDRD: >60 ML/MIN/{1.73_M2}
GLUCOSE BLD-MCNC: 253 MG/DL (ref 70–99)
GLUCOSE BLD-MCNC: 267 MG/DL (ref 70–99)
GLUCOSE BLD-MCNC: 296 MG/DL (ref 70–99)
GLUCOSE BLD-MCNC: 310 MG/DL (ref 70–99)
GLUCOSE BLD-MCNC: 312 MG/DL (ref 70–99)
HCT VFR BLD CALC: 33.1 % (ref 40.5–52.5)
HEMOGLOBIN: 10.9 G/DL (ref 13.5–17.5)
LYMPHOCYTES ABSOLUTE: 2.1 K/UL (ref 1–5.1)
LYMPHOCYTES RELATIVE PERCENT: 25 %
MCH RBC QN AUTO: 28.7 PG (ref 26–34)
MCHC RBC AUTO-ENTMCNC: 32.9 G/DL (ref 31–36)
MCV RBC AUTO: 87.1 FL (ref 80–100)
MONOCYTES ABSOLUTE: 0.4 K/UL (ref 0–1.3)
MONOCYTES RELATIVE PERCENT: 7 %
MYELOCYTE PERCENT: 4 %
NEUTROPHILS ABSOLUTE: 3.8 K/UL (ref 1.7–7.7)
NEUTROPHILS RELATIVE PERCENT: 51 %
NUCLEATED RED BLOOD CELLS: 1 /100 WBC
PDW BLD-RTO: 14.5 % (ref 12.4–15.4)
PERFORMED ON: ABNORMAL
PLATELET # BLD: 147 K/UL (ref 135–450)
PMV BLD AUTO: 9.4 FL (ref 5–10.5)
POTASSIUM REFLEX MAGNESIUM: 3.6 MMOL/L (ref 3.5–5.1)
PROMYELOCYTES PERCENT: 1 %
RBC # BLD: 3.79 M/UL (ref 4.2–5.9)
SODIUM BLD-SCNC: 133 MMOL/L (ref 136–145)
WBC # BLD: 6.4 K/UL (ref 4–11)

## 2022-11-21 PROCEDURE — 36415 COLL VENOUS BLD VENIPUNCTURE: CPT

## 2022-11-21 PROCEDURE — 73610 X-RAY EXAM OF ANKLE: CPT

## 2022-11-21 PROCEDURE — 6370000000 HC RX 637 (ALT 250 FOR IP)

## 2022-11-21 PROCEDURE — 80048 BASIC METABOLIC PNL TOTAL CA: CPT

## 2022-11-21 PROCEDURE — 6370000000 HC RX 637 (ALT 250 FOR IP): Performed by: INTERNAL MEDICINE

## 2022-11-21 PROCEDURE — 85025 COMPLETE CBC W/AUTO DIFF WBC: CPT

## 2022-11-21 PROCEDURE — 82530 CORTISOL FREE: CPT

## 2022-11-21 PROCEDURE — 6360000002 HC RX W HCPCS

## 2022-11-21 PROCEDURE — 73700 CT LOWER EXTREMITY W/O DYE: CPT

## 2022-11-21 PROCEDURE — 6370000000 HC RX 637 (ALT 250 FOR IP): Performed by: STUDENT IN AN ORGANIZED HEALTH CARE EDUCATION/TRAINING PROGRAM

## 2022-11-21 PROCEDURE — 2580000003 HC RX 258

## 2022-11-21 PROCEDURE — 1200000000 HC SEMI PRIVATE

## 2022-11-21 PROCEDURE — 99233 SBSQ HOSP IP/OBS HIGH 50: CPT | Performed by: INTERNAL MEDICINE

## 2022-11-21 RX ORDER — INSULIN LISPRO 100 [IU]/ML
15 INJECTION, SOLUTION INTRAVENOUS; SUBCUTANEOUS
Qty: 13.5 ML | Refills: 0 | Status: SHIPPED | OUTPATIENT
Start: 2022-11-21 | End: 2022-11-23 | Stop reason: SDUPTHER

## 2022-11-21 RX ORDER — ATORVASTATIN CALCIUM 80 MG/1
80 TABLET, FILM COATED ORAL NIGHTLY
Qty: 30 TABLET | Refills: 3 | Status: SHIPPED | OUTPATIENT
Start: 2022-11-21

## 2022-11-21 RX ADMIN — HALOPERIDOL 20 MG: 5 TABLET ORAL at 20:12

## 2022-11-21 RX ADMIN — INSULIN LISPRO 12 UNITS: 100 INJECTION, SOLUTION INTRAVENOUS; SUBCUTANEOUS at 12:12

## 2022-11-21 RX ADMIN — QUETIAPINE FUMARATE 200 MG: 200 TABLET ORAL at 14:47

## 2022-11-21 RX ADMIN — ASPIRIN 81 MG: 81 TABLET, CHEWABLE ORAL at 09:00

## 2022-11-21 RX ADMIN — POLYETHYLENE GLYCOL 3350 17 G: 17 POWDER, FOR SOLUTION ORAL at 23:35

## 2022-11-21 RX ADMIN — SODIUM CHLORIDE, PRESERVATIVE FREE 10 ML: 5 INJECTION INTRAVENOUS at 10:05

## 2022-11-21 RX ADMIN — HALOPERIDOL 20 MG: 5 TABLET ORAL at 09:00

## 2022-11-21 RX ADMIN — INSULIN LISPRO 15 UNITS: 100 INJECTION, SOLUTION INTRAVENOUS; SUBCUTANEOUS at 12:13

## 2022-11-21 RX ADMIN — QUETIAPINE FUMARATE 200 MG: 200 TABLET ORAL at 18:36

## 2022-11-21 RX ADMIN — DIVALPROEX SODIUM 500 MG: 125 CAPSULE, COATED PELLETS ORAL at 14:46

## 2022-11-21 RX ADMIN — INSULIN GLARGINE 65 UNITS: 100 INJECTION, SOLUTION SUBCUTANEOUS at 20:17

## 2022-11-21 RX ADMIN — INSULIN GLARGINE 65 UNITS: 100 INJECTION, SOLUTION SUBCUTANEOUS at 09:14

## 2022-11-21 RX ADMIN — INSULIN LISPRO 8 UNITS: 100 INJECTION, SOLUTION INTRAVENOUS; SUBCUTANEOUS at 08:23

## 2022-11-21 RX ADMIN — ENOXAPARIN SODIUM 30 MG: 100 INJECTION SUBCUTANEOUS at 20:11

## 2022-11-21 RX ADMIN — FENOFIBRATE 160 MG: 160 TABLET ORAL at 09:02

## 2022-11-21 RX ADMIN — PANTOPRAZOLE SODIUM 40 MG: 40 TABLET, DELAYED RELEASE ORAL at 09:00

## 2022-11-21 RX ADMIN — HALOPERIDOL 20 MG: 5 TABLET ORAL at 14:47

## 2022-11-21 RX ADMIN — FUROSEMIDE 20 MG: 20 TABLET ORAL at 09:02

## 2022-11-21 RX ADMIN — BENZTROPINE MESYLATE 1 MG: 1 TABLET ORAL at 20:12

## 2022-11-21 RX ADMIN — QUETIAPINE FUMARATE 200 MG: 200 TABLET ORAL at 09:02

## 2022-11-21 RX ADMIN — INSULIN LISPRO 8 UNITS: 100 INJECTION, SOLUTION INTRAVENOUS; SUBCUTANEOUS at 18:37

## 2022-11-21 RX ADMIN — INSULIN LISPRO 15 UNITS: 100 INJECTION, SOLUTION INTRAVENOUS; SUBCUTANEOUS at 08:22

## 2022-11-21 RX ADMIN — CARVEDILOL 50 MG: 25 TABLET, FILM COATED ORAL at 09:00

## 2022-11-21 RX ADMIN — ATORVASTATIN CALCIUM 80 MG: 80 TABLET, FILM COATED ORAL at 20:12

## 2022-11-21 RX ADMIN — QUETIAPINE FUMARATE 200 MG: 200 TABLET ORAL at 20:12

## 2022-11-21 RX ADMIN — DIVALPROEX SODIUM 500 MG: 125 CAPSULE, COATED PELLETS ORAL at 20:12

## 2022-11-21 RX ADMIN — DIVALPROEX SODIUM 500 MG: 125 CAPSULE, COATED PELLETS ORAL at 09:00

## 2022-11-21 RX ADMIN — CARVEDILOL 50 MG: 25 TABLET, FILM COATED ORAL at 20:12

## 2022-11-21 RX ADMIN — INSULIN LISPRO 15 UNITS: 100 INJECTION, SOLUTION INTRAVENOUS; SUBCUTANEOUS at 18:37

## 2022-11-21 RX ADMIN — BENZTROPINE MESYLATE 1 MG: 1 TABLET ORAL at 09:02

## 2022-11-21 ASSESSMENT — ENCOUNTER SYMPTOMS
EYES NEGATIVE: 1
RESPIRATORY NEGATIVE: 1
GASTROINTESTINAL NEGATIVE: 1

## 2022-11-21 NOTE — CARE COORDINATION
CM  spoke with  MD  who now states pt  can still be  discharged  back to  LTC:      Orders  faxed  MASOUD Mayberry to call report  . CM  requested  any  new  orders  be  updated  on  SHAQ        DISCHARGE Disposition:     [facility]   Ivette Canseco, [place]    REPORT:  [phone]  Fax:  [phone]    Electronically signed by Michoacano Meeks RN on 11/21/2022 at 2:51 PM      +++++++++++++++++++++++++++++++++++++++++++++++++++++++++++          CM  noted  d/c order  was cancelled  :    Podiatry consulted:  nondisplaced incompletely healed L distal fibula Fx on XR    CM  will cont to follow for  d/c planning:    Electronically signed by Michoacano Meeks RN on 11/21/2022 at 2:01 PM     +++++++++++++++++++++++++++++++++++++++++++++++++++++++++++++++         Case Management Assessment            Discharge Note                    Date / Time of Note: 11/21/2022 9:45 AM                  Discharge Note Completed by: Michoacano Meeks RN    Patient Name: Evert Valadez   YOB: 1980  Diagnosis: Chest pain [R07.9]  Hyperglycemia [R73.9]  Troponin level elevated [R77.8]   Date / Time: 11/15/2022 12:33 PM    Current PCP: Freeda Brittle, MD  Clinic patient: No    Hospitalization in the last 30 days: No    Advance Directives:  Code Status: Full Code  [place] DNR form completed and on chart: No    Financial:  Payor: [place] / Plan: Yunier Dooley / Product Type: *No Product type* /      Pharmacy:    [place] [address]  [address] [facility] [phone] - F [phone]  [facility] [address]. [address] [place] [address]  Phone: [phone] Fax: [phone]      Assistance purchasing medications?:    Assistance provided by Case Management: None at this time    Does patient want to participate in local refill/ meds to beds program?:      Meds To Beds General Rules:  1. Can ONLY be done Monday- Friday between 8:30am-5pm  2.  Prescription(s) must be in pharmacy by 3pm to be filled same day  3. Copy of patient's insurance/ prescription drug card and patient face sheet must be sent along with the prescription(s)  4. Cost of Rx cannot be added to hospital bill. If financial assistance is needed, please contact unit  or ;  or  CANNOT provide pharmacy voucher for patients co-pays  5.  Patients can then  the prescription on their way out of the hospital at discharge, or pharmacy can deliver to the bedside if staff is available. (payment due at time of pick-up or delivery - cash, check, or card accepted)     Able to afford home medications/ co-pay costs: Yes    ADLS:  Current PT AM-PAC Score:   /24  Current OT AM-PAC Score:   /24      DISCHARGE Disposition:     [facility]   [facility] [address] [place] [address] [place]    REPORT:  [phone]  Fax:  [phone]      LOC at discharge: [address] Completed: Yes    Notification completed in HENS/PAS?:  Not Applicable    IMM Completed:   Not Indicated    Transportation:  Transportation PLAN for discharge: EMS transportation   Mode of Transport: Ambulance stretcher - BLS  Reason for medical transport: Other: [facility]  Name of [address]:  [place]   Phone: [phone]  Time of Transport: 1730    Transport form completed: Yes    Home Care:  Home Care ordered at discharge: No  Home Care Agency: Not Applicable  Orders faxed: Yes    Durable Medical Equipment:  DME Provider: defer  Equipment obtained during hospitalization: defer    Home Oxygen and Respiratory Equipment:  Oxygen needed at discharge?: Not [address]: Not Applicable  Portable tank available for discharge?: No    Dialysis:  Dialysis patient: No    Dialysis Center:  Not Applicable    Hospice Services:  Location: Not Applicable  Agency: Not Applicable    Consents signed: Not Indicated    Referrals made at [facility] for outpatient continued care:  Not Applicable    Additional CM Notes:       CM   confirmed  d/c back to  LTC:    MASOUD Mayberry to call report    CM faxed  Jase Suarez     CM  confirmed  return  to  LTC  with Jarret Mccann in admissions  [phone]  . CM  to inform  Legal Victoria  of  d/c  . Aster Calvin  [phone]     Patient  to d/c  after  X rays  completed  :  CM  confirmed  w/  Dr musa :    24  hr  urine to be completed  out pt  . The Plan for Transition of Care is related to the following treatment goals of Chest pain [R07.9]  Hyperglycemia [R73.9]  Troponin level elevated [R77.8]    The Patient and/or patient representative Esther Pittman and his family were provided with a choice of provider and agrees with the discharge plan Yes    Freedom of choice list was provided with basic dialogue that supports the patient's individualized plan of care/goals and shares the quality data associated with the providers.  Yes    Care Transitions patient: No    Wilma Valdivia RN  [facility]  Case Management Department  Ph: [phone]

## 2022-11-21 NOTE — PROGRESS NOTES
Progress Note    Admit Date: 11/15/2022  Day: 6  Diet: ADULT DIET; Regular; 3 carb choices (45 gm/meal)    CC: Fall, chest pain    Interval history:   Patient seen and examined bedside this AM. Alert, oriented, responsive, reported no symptoms, says he was doing okay. Patient is okay for disposition, but may benefit from outpatient work-up for Cushing's as he was found to have striae and uncontrolled hyperglycemia during hospitalization with nonsuppressible cortisol on dexamethasone suppression test.    HPI:  Westley Garcia is 38 y.o M with pmhx of asthma, COPD on 2L baseline CHF, dementia, hypertension, cognitive impairment, schizoaffective disorder, type 2 diabetes with a fall. Patient has history of falls and was recently admitted for similar presentation. Patient is from [facility] and reportedly had a fall 2 days ago when he fell off from his bed. He started complaining of back pain today. Patient was not able to verbalize his needs well but he seemed to complain of chest and epigastric pain. In the ED, patient was mildly hyponatremic at 133, creatinine 1.4 baseline seems to be at 1.0, hyperglycemia at 443, troponin 0.1x2, mildly elevated alkaline phosphate 135 and ALT 64. Chest x-ray showed bibasilar atelectasis no pleural effusions or consolidations. EKG showed sinus tachycardia with J-point ST elevations. Patient was given aspirin 325 mg chewable and admitted for further evaluation.     Medications:     Scheduled Meds:   insulin lispro  15 Units SubCUTAneous TID WC    insulin glargine  65 Units SubCUTAneous BID    enoxaparin  30 mg SubCUTAneous BID    atorvastatin  80 mg Oral Nightly    benztropine  1 mg Oral BID    fenofibrate  160 mg Oral Daily    haloperidol  20 mg Oral TID    QUEtiapine  200 mg Oral 4x Daily    sodium chloride flush  5-40 mL IntraVENous 2 times per day    aspirin  81 mg Oral Daily    insulin lispro  0-16 Units SubCUTAneous TID WC    insulin lispro  0-4 Units SubCUTAneous Nightly    divalproex  500 mg Oral TID    furosemide  20 mg Oral Daily    pantoprazole  40 mg Oral Daily    carvedilol  50 mg Oral BID     Continuous Infusions:   sodium chloride      dextrose       PRN Meds:perflutren lipid microspheres, sodium chloride flush, sodium chloride, ondansetron **OR** ondansetron, polyethylene glycol, acetaminophen **OR** acetaminophen, labetalol, heparin (porcine), heparin (porcine), glucose, dextrose bolus **OR** dextrose bolus, glucagon (rDNA), dextrose    Objective:   Vitals:   T-max:  Patient Vitals for the past 8 hrs:   BP Temp Temp src Pulse Resp SpO2 Weight   11/21/22 0736 (!) 137/91 97 °F (36.1 °C) Oral 71 18 97 % --   11/21/22 0441 (!) 135/94 97.1 °F (36.2 °C) Oral 68 18 97 % 299 lb 3.2 oz (135.7 kg)         Intake/Output Summary (Last 24 hours) at 11/21/2022 1028  Last data filed at 11/21/2022 0835  Gross per 24 hour   Intake 1490 ml   Output --   Net 1490 ml         Review of Systems   Constitutional: Negative. HENT: Negative. Eyes: Negative. Respiratory: Negative. Cardiovascular: Negative. Gastrointestinal: Negative. Genitourinary: Negative. Musculoskeletal: Negative. Skin: Negative. Neurological: Negative. Psychiatric/Behavioral: Negative. Physical Exam  Vitals reviewed. Constitutional:       General: He is awake. Appearance: Normal appearance. He is morbidly obese. HENT:      Head: Normocephalic and atraumatic. Eyes:      Extraocular Movements: Extraocular movements intact. Conjunctiva/sclera: Conjunctivae normal.      Pupils: Pupils are equal, round, and reactive to light. Cardiovascular:      Rate and Rhythm: Normal rate and regular rhythm. Heart sounds: Normal heart sounds. Pulmonary:      Effort: Pulmonary effort is normal.      Breath sounds: Normal breath sounds. Abdominal:      General: Abdomen is flat. Bowel sounds are normal.      Palpations: Abdomen is soft.       Comments: Striae noted L abdomen Musculoskeletal:         General: Normal range of motion. Skin:     General: Skin is warm and dry. Neurological:      General: No focal deficit present. Mental Status: He is alert and oriented to person, place, and time. Mental status is at baseline. Psychiatric:         Mood and Affect: Mood normal.         Behavior: Behavior normal. Behavior is cooperative. LABS:    CBC:   Recent Labs     11/19/22  0810 11/20/22  0714 11/21/22  0916   WBC 8.4 7.9 6.4   HGB 11.9* 11.5* 10.9*   HCT 36.2* 35.2* 33.1*    154 147   MCV 87.3 87.7 87.1       Renal:    Recent Labs     11/19/22  0810 11/20/22  0714 11/21/22  0917   * 137 133*   K 4.7 3.4* 3.6   CL 89* 97* 94*   CO2 29 30 30   BUN 24* 20 15   CREATININE 1.4* 1.3 1.2   GLUCOSE 341* 187* 312*   CALCIUM 9.9 9.5 9.2   MG  --  2.00  --    ANIONGAP 12 10 9       Hepatic: No results for input(s): AST, ALT, BILITOT, BILIDIR, PROT, LABALBU, ALKPHOS in the last 72 hours. Troponin:   No results for input(s): TROPONINI in the last 72 hours. BNP: No results for input(s): BNP in the last 72 hours. Lipids: No results for input(s): CHOL, HDL in the last 72 hours. Invalid input(s): LDLCALCU, TRIGLYCERIDE  ABGs:  No results for input(s): PHART, DQJ5IYM, PO2ART, GDC2DFA, BEART, THGBART, Y4EYCRIT, OYI3YWJ in the last 72 hours. INR: No results for input(s): INR in the last 72 hours. Lactate: No results for input(s): LACTATE in the last 72 hours. Cultures:  -----------------------------------------------------------------  RAD:   CT HEAD WO CONTRAST   Final Result   1. No evidence for acute intracranial process. No bleed or shift   2. Paranasal sinus disease, there are areas of increased density noted within this disease process for which I cannot exclude a fungal etiology   3. Frontal temporal atrophy      CT ADRENALS W WO CONTRAST   Final Result      1.  Diffuse symmetric nodular thickening of the adrenal glands bilaterally with the right adrenal gland measuring 7.2 cm in length and the left adrenal gland measuring 6.6 cm in length. The CT appearance is most consistent with diffuse nodular adrenal    hyperplasia rather than an adrenal mass. CT follow-up could be considered. 2. Diffuse fatty infiltration of liver. XR CHEST (2 VW)   Final Result      Bibasilar atelectatic changes      XR ANKLE LEFT (MIN 3 VIEWS)    (Results Pending)   XR ANKLE RIGHT (MIN 3 VIEWS)    (Results Pending)       Assessment/Plan:   Shanna Feliz is 38 y.o M with pmhx of asthma, COPD, CHF, dementia, hypertension, cognitive impairment, schizoaffective disorder, type 2 diabetes with a fall admitted for further evaluation. Chest/epigastric pain  Concern for ACS  Patient seen for complaints of fall of epigastric or chest pain   EKG per ED notes(which was not available in patient room) significant for  J-point ST elevation through precordial leads  -Troponins elevated at 0.10 X 2, follow-up troponinX3 was 0.09  -ok to dc from medicine standpoint [11/21]  -Continue aspirin 81 mg daily  -Continue Lipitor 80 mg nightly  -Heparin drip d/c'd 11/17  -LHC showed normal coronary arteries       VAHE-resolved  Patient's creatinine on presentation was 1.4. His baseline appears to be around 1.1  -Monitor RFP     T2DM  Patient's blood sugars have been in the 300-500 status today. He likes to eat a lot of snacks and is always asking for more food. -A1c was 10% in July 2022, follow-up A1c on 11/15 12.6  -Lantus 65 U BID  - Lispro 15 units 3x daily with meals  -High-dose sliding scale  -PCOT every 4 hours  -Hypoglycemia protocol  -Dietitian consulted        Chronic medical conditions  COPD (not in exacerbation)  CHF  -Patient does not appear volume overloaded but massive distended abdomen.   -2D echo showed ejection fraction estimated to be 50%, intermediate diastolic function, cannot exclude regional wall motion abnormality secondary to poor endocardial visualization.  -Monitor daily standing weight  -20mg PO lasix daily  -Monitor daily electrolytes and BMP replace as needed  Essential hypertension  -Carvedilol 50 mg twice daily  -Labetalol 10 mg as needed  Schizoaffective disorder  -Holding home olanzapine.  Continue benztropine, depakote, haldol, seroquel    Code Status: Full code  FEN: Diabetic diet  PPX: Lovenox  DISPO: [facility]    Joaquim Sheppard MD PGY-1  11/21/22  10:28 AM    This patient will be staffed and discussed with Ayala Thomas MD.

## 2022-11-21 NOTE — CONSULTS
Department of Podiatry Consult Note  Resident       Reason for Consult:  left ankle fracture    Requesting Physician:  Jero Joshi MD    CHIEF COMPLAINT:  left ankle pain, left ankle fracture    HISTORY OF PRESENT ILLNESS:                The patient is a 38 y.o. male with significant past medical history as listed below. Podiatry was consulted for new xray finding of fibular fracture left lower extremity. Patient relates that he fell several weeks ago and has had pain to his left ankle ever since. He states that he has been walking on the left lower extremity since but that it has continued be uncomfortable. The patient admits to be diabetic and that he has numbness to the b/l lower extremity. Patient relates that pain is 4/10 to the left lower extremity. Patient relates history of previous ankle injury in 1992 with possible ankle fracture at that time. Patient denies fever, chills, nausea, vomiting, shortness of breath, chest pain. Patient has no other pedal complaints at this time. Past Medical History:    No past medical history on file. Past Surgical History:    No past surgical history on file. Allergies:   Patient has no known allergies. Medications:   Home Meds  No current facility-administered medications on file prior to encounter.      Current Outpatient Medications on File Prior to Encounter   Medication Sig Dispense Refill    aspirin 81 MG chewable tablet Take 81 mg by mouth daily      benztropine (COGENTIN) 1 MG tablet Take 1 mg by mouth 2 times daily      chlorthalidone (HYGROTON) 25 MG tablet Take 75 mg by mouth daily      carvedilol (COREG) 25 MG tablet Take 50 mg by mouth 2 times daily (with meals)      vitamin B-12 (CYANOCOBALAMIN) 1000 MCG tablet Take 1,000 mcg by mouth daily      divalproex (DEPAKOTE SPRINKLE) 125 MG capsule Take 500 mg by mouth 3 times daily      fenofibrate micronized (LOFIBRA) 200 MG capsule Take 200 mg by mouth every morning (before breakfast)      ferrous gluconate (FERGON) 324 (38 Fe) MG tablet Take 324 mg by mouth daily (with breakfast)      haloperidol (HALDOL) 20 MG tablet Take 20 mg by mouth 3 times daily      hydrALAZINE (APRESOLINE) 25 MG tablet Take 25 mg by mouth 3 times daily      furosemide (LASIX) 20 MG tablet Take 20 mg by mouth daily      metFORMIN (GLUCOPHAGE) 500 MG tablet Take 500 mg by mouth 2 times daily (with meals)      insulin aspart (NOVOLOG FLEXPEN) 100 UNIT/ML injection pen Inject into the skin 3 times daily (before meals) Inject per sliding scale: if 200-249 inject 4 units, if 250-299 inject 8 units, if 300-349 inject 10 units, if 350-399 inject 12 units      omeprazole (PRILOSEC) 20 MG delayed release capsule Take 20 mg by mouth daily      senna (SENOKOT) 8.6 MG tablet Take 1 tablet by mouth 2 times daily      QUEtiapine (SEROQUEL) 200 MG tablet Take 200 mg by mouth 4 times daily      spironolactone (ALDACTONE) 50 MG tablet Take 50 mg by mouth daily      Dulaglutide (TRULICITY) 0.13 IA/5.0GX SOPN Inject 0.75 mg into the skin once a week On Saturdays      acetaminophen (TYLENOL) 325 MG tablet Take 975 mg by mouth every 6 hours as needed for Pain         Current Meds  insulin lispro (1 Unit Dial) (HUMALOG/ADMELOG) pen 15 Units, TID WC  insulin glargine (LANTUS;BASAGLAR) injection pen 65 Units, BID  enoxaparin Sodium (LOVENOX) injection 30 mg, BID  atorvastatin (LIPITOR) tablet 80 mg, Nightly  benztropine (COGENTIN) tablet 1 mg, BID  fenofibrate (TRIGLIDE) tablet 160 mg, Daily  haloperidol (HALDOL) tablet 20 mg, TID  QUEtiapine (SEROQUEL) tablet 200 mg, 4x Daily  perflutren lipid microspheres (DEFINITY) injection 1.5 mL, ONCE PRN  sodium chloride flush 0.9 % injection 5-40 mL, 2 times per day  sodium chloride flush 0.9 % injection 5-40 mL, PRN  0.9 % sodium chloride infusion, PRN  ondansetron (ZOFRAN-ODT) disintegrating tablet 4 mg, Q8H PRN   Or  ondansetron (ZOFRAN) injection 4 mg, Q6H PRN  polyethylene glycol (GLYCOLAX) packet 17 g, Daily PRN  acetaminophen (TYLENOL) tablet 650 mg, Q6H PRN   Or  acetaminophen (TYLENOL) suppository 650 mg, Q6H PRN  labetalol (NORMODYNE;TRANDATE) injection 10 mg, Q4H PRN  aspirin chewable tablet 81 mg, Daily  insulin lispro (1 Unit Dial) (HUMALOG/ADMELOG) pen 0-16 Units, TID WC  insulin lispro (1 Unit Dial) (HUMALOG/ADMELOG) pen 0-4 Units, Nightly  divalproex (DEPAKOTE SPRINKLE) capsule 500 mg, TID  furosemide (LASIX) tablet 20 mg, Daily  pantoprazole (PROTONIX) tablet 40 mg, Daily  heparin (porcine) injection 4,000 Units, PRN  heparin (porcine) injection 2,000 Units, PRN  glucose chewable tablet 16 g, PRN  dextrose bolus 10% 125 mL, PRN   Or  dextrose bolus 10% 250 mL, PRN  glucagon (rDNA) injection 1 mg, PRN  dextrose 10 % infusion, Continuous PRN  carvedilol (COREG) tablet 50 mg, BID        Family History:   No family history on file. Social History:   TOBACCO:   reports that he has been smoking cigarettes. He has never used smokeless tobacco.  ETOH:   reports no history of alcohol use. DRUGS:   reports that he does not currently use drugs after having used the following drugs: Marijuana Megan Legions). REVIEW OF SYSTEMS:    As above. PHYSICAL EXAM:      Vitals:    /89   Pulse 76   Temp 97.5 °F (36.4 °C) (Oral)   Resp 18   Ht 6' 1\" (1.854 m)   Wt 299 lb 3.2 oz (135.7 kg)   SpO2 97%   BMI 39.47 kg/m²     LABS:   Recent Labs     11/20/22  0714 11/21/22  0916   WBC 7.9 6.4   HGB 11.5* 10.9*   HCT 35.2* 33.1*    147     Recent Labs     11/21/22  0917   *   K 3.6   CL 94*   CO2 30   BUN 15   CREATININE 1.2     No results for input(s): PROT, INR, APTT in the last 72 hours. VASCULAR: DP and PT pulses faintly palpable, Upon hand held doppler examination DP/PT pulses noted to be faintly biphasic b/l . CFT is brisk to the digits of the foot b/l. Skin temperature is warm to cool from proximal to distal with no focal calor noted. 1+ pitting b/l LE edema noted.  No pain with calf compression b/l.    NEUROLOGIC: Gross and epicritic sensation is diminished b/l. Protective sensation is diminished at all pedal sites b/l. DERMATOLOGIC:  Left lower extremity:  No evidence of soft tissue compromise. No ecchymosis present. No open wounds appreciated. No bullae or other obvious skin lesion noted. Right lower extremity is a closed soft tissue envelope without evidence of wounds or infection. MUSCULOSKELETAL: Muscle strength is 4/5 for all pedal groups tested. Ankle joint ROM is decreased in dorsiflexion with the knee extended. No obvious biomechanical abnormalities. Negative anterior drawer left lower extremity. Minimal pain on palpation to the lateral malleoli and medial malleoli. No pain with ankle ROM left lower extremity. IMAGING:  XR b/l ankle:  Narrative   History: Status post fall with bilateral ankle pain. COMPARISON: None. Right ankle:       FINDINGS: AP, lateral, and mortise views of the right ankle were obtained. Alignment is anatomic. Normal joint spaces. No fracture or dislocation. Impression   1. No acute findings in the right ankle. Left ankle:       FINDINGS: AP, lateral, and mortise views of the left ankle were obtained. There is a nondisplaced comminuted fracture of the distal shaft of the left fibula with incomplete healing. There is abundant surrounding periosteal reaction and callus formation    with ossification extending into the interosseous membrane. No other fractures. IMPRESSION:   1. Comminuted nondisplaced incompletely healed fracture at the distal shaft of the left fibula. 2. Ossification extending from the distal shaft of the left fibula into the tibiofibular interosseous membrane likely the sequela of trauma. 3. No dislocation. CT left ankle final read pending.       IMPRESSION/RECOMMENDATIONS:    -Comminuted fibular fracture- left ankle  -Concern for syndesmotic disruption-left ankle  -Left ankle pain  -Diabetes type II -Patient examined and evaluated at the bedside   -Hypertensive, Hypothermic, otherwise VSS. 6.4 Leukocytosis noted. -X-rays reviewed and impressions noted above  -Due to concern for fracture comminution CT ordered of the left ankle for further evaluation and possible surgical planning.   -Posterior splint to the left lower extremity with modified rhoades compression.  -Patient to be non-weight bearing to the left lower extremity  -Crutches ordered and to be used for all ambulation  -No antibiotics warranted at this time. - If patient to discharge today, patient to follow up outpatient with Dr. Bettina Velázquez DPM for further evaluation  -Due to the nature of the fracture on imaging surgical intervention may be medically necessary for long term stability of left ankle and to prevent long term arthritic complications while promoting improved ambulation. DISPO:Comminuted fibular fracture left lower extremity with possible syndesmotic involvement. Posterior splint applied and to be left in place until outpatient follow up can be established. Patient to be non-weightbearing to the left lower extremity with use of crutches. If patient to discharge today please follow up with Dr. Bettina Velázquez in his outpatient podiatry clinic.     - The patient will be staffed with Dr. Bettina Velázquez, Jacob Ennis DPM   Podiatric Resident PGY1  Pager [phone] or Rex  11/21/2022, 4:45 PM

## 2022-11-21 NOTE — PLAN OF CARE
Problem: Safety - Adult  Goal: Free from fall injury  Outcome: Progressing  Progressing: patient remains free from falls, safety measures in place to prevent falls;bed in low position and locked, bed alarm on, call light and personal items within reach, video monitoring in progress, pt utilizing the call system for assistance.      Problem: Chronic Conditions and Co-morbidities  Goal: Patient's chronic conditions and co-morbidity symptoms are monitored and maintained or improved  Outcome: Progressing    Problem: Discharge Planning  Goal: Discharge to home or other facility with appropriate resources  Outcome: Progressing

## 2022-11-21 NOTE — CONSULTS
Nephrology Consult Note                                                                                                                                                                                                                                                                                                                                                               Office : [phone]     Fax :[phone]              Patient's Name: [place]      Reason for Consult:  r/o Cushing syndrome   Requesting Physician:  Wood Iniguez MD      Chief Complaint:  fall      History of Present Ilness:    pmhx of asthma, COPD on 2L baseline CHF, dementia, hypertension, cognitive impairment, schizoaffective disorder, type 2 diabetes with a fall  Pt has cushingoid features     CT adrenal - hyperplasia   No past medical history on file. HTN     No past surgical history on file. None     No family history on file. HTN      reports that he has been smoking cigarettes. He has never used smokeless tobacco. He reports that he does not currently use drugs after having used the following drugs: Marijuana Nan Geraldine). He reports that he does not drink alcohol. Allergies:  Patient has no known allergies.     Current Medications:    insulin lispro (1 Unit Dial) (HUMALOG/ADMELOG) pen 15 Units, TID WC  insulin glargine (LANTUS;BASAGLAR) injection pen 65 Units, BID  enoxaparin Sodium (LOVENOX) injection 30 mg, BID  atorvastatin (LIPITOR) tablet 80 mg, Nightly  benztropine (COGENTIN) tablet 1 mg, BID  fenofibrate (TRIGLIDE) tablet 160 mg, Daily  haloperidol (HALDOL) tablet 20 mg, TID  QUEtiapine (SEROQUEL) tablet 200 mg, 4x Daily  perflutren lipid microspheres (DEFINITY) injection 1.5 mL, ONCE PRN  sodium chloride flush 0.9 % injection 5-40 mL, 2 times per day  sodium chloride flush 0.9 % injection 5-40 mL, PRN  0.9 % sodium chloride infusion, PRN  ondansetron (ZOFRAN-ODT) disintegrating tablet 4 mg, Q8H PRN   Or  ondansetron [facility]) injection 4 mg, Q6H PRN  polyethylene glycol (GLYCOLAX) packet 17 g, Daily PRN  acetaminophen (TYLENOL) tablet 650 mg, Q6H PRN   Or  acetaminophen (TYLENOL) suppository 650 mg, Q6H PRN  labetalol (NORMODYNE;TRANDATE) injection 10 mg, Q4H PRN  aspirin chewable tablet 81 mg, Daily  insulin lispro (1 Unit Dial) (HUMALOG/ADMELOG) pen 0-16 Units, TID WC  insulin lispro (1 Unit Dial) (HUMALOG/ADMELOG) pen 0-4 Units, Nightly  divalproex (DEPAKOTE SPRINKLE) capsule 500 mg, TID  furosemide (LASIX) tablet 20 mg, Daily  pantoprazole (PROTONIX) tablet 40 mg, Daily  heparin (porcine) injection 4,000 Units, PRN  heparin (porcine) injection 2,000 Units, PRN  glucose chewable tablet 16 g, PRN  dextrose bolus 10% 125 mL, PRN   Or  dextrose bolus 10% 250 mL, PRN  glucagon (rDNA) injection 1 mg, PRN  dextrose 10 % infusion, Continuous PRN  carvedilol (COREG) tablet 50 mg, BID        Review of Systems:   14 point ROS obtained but were negative except mentioned in HPI      Physical exam:     Vitals:  BP (!) 151/87   Pulse 80   Temp 97.7 °F (36.5 °C) (Oral)   Resp 18   Ht 6' 1\" (1.854 m)   Wt 299 lb 3.2 oz (135.7 kg)   SpO2 96%   BMI 39.47 kg/m²   Constitutional:  OAA X3 NAD  Skin: striae   Heent:  eomi, mmm  Neck: no bruits or jvd noted  Cardiovascular:  S1, S2 without m/r/g  Respiratory: CTA B without w/r/r  Abdomen:  +bs, soft, nt, nd  Ext: ++ lower extremity edema  Psychiatric: mood and affect appropriate  Musculoskeletal:  Rom, muscular strength intact    Data:   Labs:  CBC:   Recent Labs     11/19/22  0810 11/20/22  0714 11/21/22  0916   WBC 8.4 7.9 6.4   HGB 11.9* 11.5* 10.9*    154 147     BMP:    Recent Labs     11/19/22  0810 11/20/22  0714 11/21/22  0917   * 137 133*   K 4.7 3.4* 3.6   CL 89* 97* 94*   CO2 29 30 30   BUN 24* 20 15   CREATININE 1.4* 1.3 1.2   GLUCOSE 341* 187* 312*     Ca/Mg/Phos:   Recent Labs     11/19/22  0810 11/20/22  0714 11/21/22  0917   CALCIUM 9.9 9.5 9.2   MG  --  2.00  -- Hepatic: No results for input(s): AST, ALT, ALB, BILITOT, ALKPHOS in the last 72 hours. Troponin: No results for input(s): TROPONINI in the last 72 hours. BNP: No results for input(s): BNP in the last 72 hours. Lipids: No results for input(s): CHOL, TRIG, HDL, LDLCALC, LABVLDL in the last 72 hours. ABGs: No results for input(s): PHART, PO2ART, EHU0JFA in the last 72 hours. INR: No results for input(s): INR in the last 72 hours. UA:No results for input(s): Joyce Shantell, GLUCOSEU, BILIRUBINUR, Karene Specter, BLOODU, PHUR, PROTEINU, UROBILINOGEN, NITRU, LEUKOCYTESUR, LABMICR, URINETYPE in the last 72 hours. Urine Microscopic: No results for input(s): LABCAST, BACTERIA, COMU, HYALCAST, WBCUA, RBCUA, EPIU in the last 72 hours. Urine Culture: No results for input(s): LABURIN in the last 72 hours. Urine Chemistry: No results for input(s): Prince Reveles, PROTEINUR, NAUR in the last 72 hours. IMAGING:  CT ANKLE LEFT WO CONTRAST   Final Result   1. 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 Superimposed acute distal fibular fracture with comminution and    distraction. XR ANKLE LEFT (MIN 3 VIEWS)   Final Result   1. No acute findings in the right ankle. Left ankle:      FINDINGS: AP, lateral, and mortise views of the left ankle were obtained. There is a nondisplaced comminuted fracture of the distal shaft of the left fibula with incomplete healing. There is abundant surrounding periosteal reaction and callus formation    with ossification extending into the interosseous membrane. No other fractures. IMPRESSION:   1. Comminuted nondisplaced incompletely healed fracture at the distal shaft of the left fibula.    2. Ossification extending from the distal shaft of the left fibula into the tibiofibular interosseous membrane likely the sequela of trauma. 3. No dislocation. XR ANKLE RIGHT (MIN 3 VIEWS)   Final Result   1. No acute findings in the right ankle. Left ankle:      FINDINGS: AP, lateral, and mortise views of the left ankle were obtained. There is a nondisplaced comminuted fracture of the distal shaft of the left fibula with incomplete healing. There is abundant surrounding periosteal reaction and callus formation    with ossification extending into the interosseous membrane. No other fractures. IMPRESSION:   1. Comminuted nondisplaced incompletely healed fracture at the distal shaft of the left fibula. 2. Ossification extending from the distal shaft of the left fibula into the tibiofibular interosseous membrane likely the sequela of trauma. 3. No dislocation. CT HEAD WO CONTRAST   Final Result   1. No evidence for acute intracranial process. No bleed or shift   2. Paranasal sinus disease, there are areas of increased density noted within this disease process for which I cannot exclude a fungal etiology   3. Frontal temporal atrophy      CT ADRENALS W WO CONTRAST   Final Result      1. Diffuse symmetric nodular thickening of the adrenal glands bilaterally with the right adrenal gland measuring 7.2 cm in length and the left adrenal gland measuring 6.6 cm in length. The CT appearance is most consistent with diffuse nodular adrenal    hyperplasia rather than an adrenal mass. CT follow-up could be considered. 2. Diffuse fatty infiltration of liver. XR CHEST (2 VW)   Final Result      Bibasilar atelectatic changes          Assessment/Plan   Obesity     2. HTN    3. Anemia    4. Acid- base/ Electrolyte imbalance     5.  Hypokalemia     Plan     Pt has symptoms of hypercortisolism   Cortisol 28.4  CT Adrenal  - Hyperplasia   ACTH pending      Will need   24 hours UFC   Salivary cortisol level   Dexa supp test                 Thank you for allowing us to participate in care of Divya Baumann MD  Feel free to contact me   [facility] [address]  Office : [phone]  Fax :[phone]

## 2022-11-21 NOTE — DISCHARGE INSTRUCTIONS
Dr. Katrin Clements, DPM                                           Podiatry Instructions    -  KEEP FOOT ELEVATED AS MUCH AS POSSIBLE UNTIL YOUR NEXT VISIT    -  Place an ice pack on the bandaged site for 15 minutes every hour while awake    -  Please apply compression daily to the left lower extremity    -  Patient to ambulate with use of walking boot only to the left lower extremity.     -Please make appointment for tomorrow at Dr. Smyth [place] private office

## 2022-11-21 NOTE — DISCHARGE INSTR - COC
Continuity of Care Form    Patient Name: Cathi Randolph   :  1980  MRN:  [id number]    Admit date:  11/15/2022  Discharge date:  ***    Code Status Order: Full Code   Advance Directives:     Admitting Physician:  Taylor Blake MD  PCP: Darryle Cooper, MD    Discharging Nurse: [facility] Unit/Room#: 5376/7764-00  Discharging Unit Phone Number: ***    Emergency Contact:   Extended Emergency Contact Information  Primary Emergency Contact: [address] Phone: [phone]  Mobile Phone: [phone]  Relation: Other    Past Surgical History:  No past surgical history on file. Immunization History: There is no immunization history on file for this patient. Active Problems:  Patient Active Problem List   Diagnosis Code    Chest pain R07.9    Troponin level elevated R77.8       Isolation/Infection:   Isolation            No Isolation          Patient Infection Status       None to display            Nurse Assessment:  Last Vital Signs: BP (!) 137/91   Pulse 71   Temp 97 °F (36.1 °C) (Oral)   Resp 18   Ht 6' 1\" (1.854 m)   Wt 299 lb 3.2 oz (135.7 kg)   SpO2 97%   BMI 39.47 kg/m²     Last documented pain score (0-10 scale):    Last Weight:   Wt Readings from Last 1 Encounters:   22 299 lb 3.2 oz (135.7 kg)     Mental Status:  {IP PT MENTAL STATUS:19390}    IV Access:  { SHAQ IV ACCESS:661764682}    Nursing Mobility/ADLs:  Walking   {P DME LAMZ:426536557}  Transfer  {P DME CSCD:962378166}  Bathing  {CHP DME EVBI:579802558}  Dressing  {CHP DME JTMA:356807808}  Toileting  {P DME EYMS:890907924}  Feeding  {P DME HFAK:937262581}  Med Admin  {P DME RQJC:450418818}  Med Delivery   { SHAQ MED Delivery:137755351}    Wound Care Documentation and Therapy:        Elimination:  Continence:    Bowel: {YES / XE:43592}  Bladder: {YES / EY:53786}  Urinary Catheter: {Urinary Catheter:938618175}   Colostomy/Ileostomy/Ileal Conduit: {YES / TH:96928}       Date of Last BM: ***    Intake/Output Summary (Last 24 hours) at 2022 0925  Last data filed at 2022 0835  Gross per 24 hour   Intake 1490 ml   Output --   Net 1490 ml     I/O last 3 completed shifts: In: 36 [P.O.:2370]  Out: -     Safety Concerns:     [address] Concerns:198351846}    Impairments/Disabilities:      [address] SHAQ Impairments/Disabilities:431237681}    Nutrition Therapy:  Current Nutrition Therapy:   [facility] SHAQ Diet List:240370723}    Routes of Feeding: {CHP DME Other Feedings:889901426}  Liquids: {Slp liquid thickness:35831}  Daily Fluid Restriction: {CHP DME Yes amt example:348243569}  Last Modified Barium Swallow with Video (Video Swallowing Test): {Done Not Done SNIE:653201021}    Treatments at the Time of Hospital Discharge:   Respiratory Treatments: ***  Oxygen Therapy:  {Therapy; copd oxygen:13918}  Ventilator:    { CC Vent UWYQ:126110155}    Rehab Therapies: {THERAPEUTIC INTERVENTION:3277060559}  Weight Bearing Status/Restrictions: [facility] Weight Bearin}  Other Medical Equipment (for information only, NOT a DME order):  {EQUIPMENT:353062702}  Other Treatments: ***    Patient's personal belongings (please select all that are sent with patient):  {CHP DME Belongings:044051468}    RN SIGNATURE:  {Esignature:398196408}    CASE MANAGEMENT/SOCIAL WORK SECTION    Inpatient Status Date: 11/15/2022    Readmission Risk Assessment Score:  Readmission Risk              Risk of Unplanned Readmission:  19           Discharging to Facility/ [address] [place] [address], [facility] [address]    Phone: [phone]    Fax:  [phone]        Dialysis Facility (if applicable)   NA      / signature: Electronically signed by Tammi Estrada RN on 22 at 9:26 AM EST    PHYSICIAN SECTION    Prognosis: Fair    Condition at Discharge: Stable    Rehab Potential (if transferring to Rehab):  Fair    Recommended Labs or Other Treatments After Discharge:     Dr. America Cortez, DPM                                           Podiatry Instructions    -  KEEP FOOT ELEVATED AS MUCH AS POSSIBLE UNTIL YOUR NEXT VISIT    -  Place an ice pack on the bandaged site for 15 minutes every hour while awake    -  Keep dressing clean, dry, and intact.   DO NOT REMOVE DRESSING.      -  NON weightbearing on the left foot with crutches.    -Please make appointment for tomorrow at Dr. Lorre Denver Cullum's private office    PT OT   [facility]  OP follow up with endocrinology     Physician Certification: I certify the above information and transfer of Austyn Joiner  is necessary for the continuing treatment of the diagnosis listed and that he requires Assisted Living   Update Admission H&P: No change in H&P    PHYSICIAN SIGNATURE:  Electronically signed by Jb Edgar MD on 11/21/22 at 12:53 PM EST

## 2022-11-21 NOTE — CARE COORDINATION
CM  following for  d/c planning:    D/C order  was  placed  , this Am    -   the   d/c order  was  cancelled  @  1445      CM  later  spoke with Medical resident  who stated  after  X rays were  completed  that the  patient  could still go to  LTC facility . As  noted  in  d/c  note:     JUANPABLO  called  Legal guardian Fabiana Rodriguez to update  and  she  stated  she wanted  pt  to  d/c to different  facility :  and that she  expressed  that  last  week  on  11/17/022. And had  been in contact  with  [facility]  in [place]:      JUANPABLO  explained  the  process when  pt is admitted  from  [address] [place] [address] that they return and  transfer  from [facility] to [facility] and that CM/SW would  reach out to  [facility]  liaison and  inform them of the request  and that they cannot stay in the  acute  care  setting once medically cleared  for  d/c. She  then spoke with herthe  pt's bedside  RN Shawanda  and  afterwards  expressed  he was  not  ready and felt he was  bayron  rushed  and had  expressed concerns  with the  LTC facility following  up out pt  . JUANPABLO reviewed  IMM again and  informed her  if she was concerned  she could appeal the  discharge  which stated  she was  planning to do :    JUANPABLO informed her that once she  called  Sylwia Almonte  to please  provide us with the  Appeal  number so we  can  follow through . and  fax  over the  clinical information , she  acknowledged. JUANPABLO  update d RN  Shawanda and  cancelled  [facility]  transport at  [phone]     CM  attempted  to call Ryan Godoy  [phone], in admissions at  [facility]  but her  VM Box is  full . No answer  at the  main line either  . CM/SW will follow up in the  AM .      JUANPABLO [address] [facility]   Skilled Nursing        Address   [address] [place] [address]    [phone]           MD  informed  via [employer]  of  patient  guardian  appealing  discharge  status.         Patient  guardian  requests  copy of  SHAQ/AVS   faxed to her  @ [phone]      Electronically signed by Torrie Segundo RN on 11/21/2022 at 5:56 PM        Torrie Segundo RN Case Manager  [facility]  [address] [place] [address].   Ivette Brewster [id number]  [phone]  Fax [phone]

## 2022-11-21 NOTE — PLAN OF CARE
Problem: Discharge Planning  Goal: Discharge to home or other facility with appropriate resources  11/21/2022 1647 by MASOUD Rocha  Outcome: Adequate for Discharge  11/21/2022 0425 by Lucho Omer RN  Outcome: Progressing  11/21/2022 0423 by Lucho Omer RN  Outcome: Progressing     Problem: Safety - Adult  Goal: Free from fall injury  11/21/2022 1647 by MASOUD Rocha  Outcome: Adequate for Discharge  11/21/2022 0425 by Lucho Omer RN  Outcome: Progressing  11/21/2022 0424 by Lucho Omer RN  Outcome: Progressing  11/21/2022 0423 by Lucho Omer RN  Outcome: Progressing     Problem: Pain  Goal: Verbalizes/displays adequate comfort level or baseline comfort level  Outcome: Adequate for Discharge     Problem: Chronic Conditions and Co-morbidities  Goal: Patient's chronic conditions and co-morbidity symptoms are monitored and maintained or improved  11/21/2022 1647 by MASOUD Rocha  Outcome: Adequate for Discharge  11/21/2022 0425 by Lucho Omer RN  Outcome: Progressing  11/21/2022 0424 by Lucho Omer RN  Outcome: Progressing

## 2022-11-22 ENCOUNTER — APPOINTMENT (OUTPATIENT)
Dept: GENERAL RADIOLOGY | Age: 42
DRG: 287 | End: 2022-11-22
Payer: COMMERCIAL

## 2022-11-22 LAB
ANION GAP SERPL CALCULATED.3IONS-SCNC: 9 MMOL/L (ref 3–16)
BASOPHILS ABSOLUTE: 0 K/UL (ref 0–0.2)
BASOPHILS RELATIVE PERCENT: 0.2 %
BUN BLDV-MCNC: 15 MG/DL (ref 7–20)
CALCIUM SERPL-MCNC: 9.3 MG/DL (ref 8.3–10.6)
CHLORIDE BLD-SCNC: 99 MMOL/L (ref 99–110)
CO2: 32 MMOL/L (ref 21–32)
CREAT SERPL-MCNC: 1.1 MG/DL (ref 0.9–1.3)
EOSINOPHILS ABSOLUTE: 0 K/UL (ref 0–0.6)
EOSINOPHILS RELATIVE PERCENT: 0.1 %
GFR SERPL CREATININE-BSD FRML MDRD: >60 ML/MIN/{1.73_M2}
GLUCOSE BLD-MCNC: 186 MG/DL (ref 70–99)
GLUCOSE BLD-MCNC: 194 MG/DL (ref 70–99)
GLUCOSE BLD-MCNC: 261 MG/DL (ref 70–99)
GLUCOSE BLD-MCNC: 306 MG/DL (ref 70–99)
GLUCOSE BLD-MCNC: 339 MG/DL (ref 70–99)
HCT VFR BLD CALC: 34.4 % (ref 40.5–52.5)
HEMOGLOBIN: 11.3 G/DL (ref 13.5–17.5)
LYMPHOCYTES ABSOLUTE: 1.7 K/UL (ref 1–5.1)
LYMPHOCYTES RELATIVE PERCENT: 22.4 %
MCH RBC QN AUTO: 28.7 PG (ref 26–34)
MCHC RBC AUTO-ENTMCNC: 32.8 G/DL (ref 31–36)
MCV RBC AUTO: 87.4 FL (ref 80–100)
MONOCYTES ABSOLUTE: 1.1 K/UL (ref 0–1.3)
MONOCYTES RELATIVE PERCENT: 14.2 %
NEUTROPHILS ABSOLUTE: 4.9 K/UL (ref 1.7–7.7)
NEUTROPHILS RELATIVE PERCENT: 63.1 %
PDW BLD-RTO: 14.7 % (ref 12.4–15.4)
PERFORMED ON: ABNORMAL
PLATELET # BLD: 144 K/UL (ref 135–450)
PMV BLD AUTO: 9 FL (ref 5–10.5)
POTASSIUM REFLEX MAGNESIUM: 4 MMOL/L (ref 3.5–5.1)
RBC # BLD: 3.94 M/UL (ref 4.2–5.9)
REASON FOR REJECTION: NORMAL
REJECTED TEST: NORMAL
SODIUM BLD-SCNC: 140 MMOL/L (ref 136–145)
WBC # BLD: 7.8 K/UL (ref 4–11)

## 2022-11-22 PROCEDURE — 6370000000 HC RX 637 (ALT 250 FOR IP)

## 2022-11-22 PROCEDURE — 99233 SBSQ HOSP IP/OBS HIGH 50: CPT | Performed by: INTERNAL MEDICINE

## 2022-11-22 PROCEDURE — 36415 COLL VENOUS BLD VENIPUNCTURE: CPT

## 2022-11-22 PROCEDURE — 6370000000 HC RX 637 (ALT 250 FOR IP): Performed by: STUDENT IN AN ORGANIZED HEALTH CARE EDUCATION/TRAINING PROGRAM

## 2022-11-22 PROCEDURE — 6370000000 HC RX 637 (ALT 250 FOR IP): Performed by: INTERNAL MEDICINE

## 2022-11-22 PROCEDURE — 97166 OT EVAL MOD COMPLEX 45 MIN: CPT

## 2022-11-22 PROCEDURE — 80048 BASIC METABOLIC PNL TOTAL CA: CPT

## 2022-11-22 PROCEDURE — 97530 THERAPEUTIC ACTIVITIES: CPT

## 2022-11-22 PROCEDURE — 1200000000 HC SEMI PRIVATE

## 2022-11-22 PROCEDURE — 85025 COMPLETE CBC W/AUTO DIFF WBC: CPT

## 2022-11-22 PROCEDURE — 93005 ELECTROCARDIOGRAM TRACING: CPT

## 2022-11-22 PROCEDURE — 6360000002 HC RX W HCPCS

## 2022-11-22 PROCEDURE — 97162 PT EVAL MOD COMPLEX 30 MIN: CPT

## 2022-11-22 PROCEDURE — 97535 SELF CARE MNGMENT TRAINING: CPT

## 2022-11-22 PROCEDURE — 2580000003 HC RX 258

## 2022-11-22 PROCEDURE — 71046 X-RAY EXAM CHEST 2 VIEWS: CPT

## 2022-11-22 PROCEDURE — 82024 ASSAY OF ACTH: CPT

## 2022-11-22 RX ORDER — OXYCODONE HYDROCHLORIDE 5 MG/1
5 TABLET ORAL EVERY 4 HOURS PRN
Status: DISCONTINUED | OUTPATIENT
Start: 2022-11-22 | End: 2022-11-23 | Stop reason: HOSPADM

## 2022-11-22 RX ADMIN — INSULIN LISPRO 15 UNITS: 100 INJECTION, SOLUTION INTRAVENOUS; SUBCUTANEOUS at 17:46

## 2022-11-22 RX ADMIN — ASPIRIN 81 MG: 81 TABLET, CHEWABLE ORAL at 09:32

## 2022-11-22 RX ADMIN — DIVALPROEX SODIUM 500 MG: 125 CAPSULE, COATED PELLETS ORAL at 09:35

## 2022-11-22 RX ADMIN — PANTOPRAZOLE SODIUM 40 MG: 40 TABLET, DELAYED RELEASE ORAL at 09:33

## 2022-11-22 RX ADMIN — CARVEDILOL 50 MG: 25 TABLET, FILM COATED ORAL at 21:12

## 2022-11-22 RX ADMIN — QUETIAPINE FUMARATE 200 MG: 200 TABLET ORAL at 13:33

## 2022-11-22 RX ADMIN — INSULIN LISPRO 4 UNITS: 100 INJECTION, SOLUTION INTRAVENOUS; SUBCUTANEOUS at 21:13

## 2022-11-22 RX ADMIN — QUETIAPINE FUMARATE 200 MG: 200 TABLET ORAL at 17:44

## 2022-11-22 RX ADMIN — SODIUM CHLORIDE, PRESERVATIVE FREE 10 ML: 5 INJECTION INTRAVENOUS at 22:11

## 2022-11-22 RX ADMIN — ENOXAPARIN SODIUM 30 MG: 100 INJECTION SUBCUTANEOUS at 09:38

## 2022-11-22 RX ADMIN — INSULIN GLARGINE 65 UNITS: 100 INJECTION, SOLUTION SUBCUTANEOUS at 21:17

## 2022-11-22 RX ADMIN — INSULIN LISPRO 8 UNITS: 100 INJECTION, SOLUTION INTRAVENOUS; SUBCUTANEOUS at 12:32

## 2022-11-22 RX ADMIN — FUROSEMIDE 20 MG: 20 TABLET ORAL at 09:33

## 2022-11-22 RX ADMIN — HALOPERIDOL 20 MG: 5 TABLET ORAL at 13:33

## 2022-11-22 RX ADMIN — CARVEDILOL 50 MG: 25 TABLET, FILM COATED ORAL at 09:34

## 2022-11-22 RX ADMIN — HALOPERIDOL 20 MG: 5 TABLET ORAL at 21:12

## 2022-11-22 RX ADMIN — INSULIN LISPRO 15 UNITS: 100 INJECTION, SOLUTION INTRAVENOUS; SUBCUTANEOUS at 09:42

## 2022-11-22 RX ADMIN — DIVALPROEX SODIUM 500 MG: 125 CAPSULE, COATED PELLETS ORAL at 21:12

## 2022-11-22 RX ADMIN — ATORVASTATIN CALCIUM 80 MG: 80 TABLET, FILM COATED ORAL at 21:12

## 2022-11-22 RX ADMIN — BENZTROPINE MESYLATE 1 MG: 1 TABLET ORAL at 09:34

## 2022-11-22 RX ADMIN — ENOXAPARIN SODIUM 30 MG: 100 INJECTION SUBCUTANEOUS at 21:12

## 2022-11-22 RX ADMIN — QUETIAPINE FUMARATE 200 MG: 200 TABLET ORAL at 09:33

## 2022-11-22 RX ADMIN — QUETIAPINE FUMARATE 200 MG: 200 TABLET ORAL at 21:12

## 2022-11-22 RX ADMIN — INSULIN GLARGINE 65 UNITS: 100 INJECTION, SOLUTION SUBCUTANEOUS at 09:38

## 2022-11-22 RX ADMIN — SODIUM CHLORIDE, PRESERVATIVE FREE 10 ML: 5 INJECTION INTRAVENOUS at 09:42

## 2022-11-22 RX ADMIN — BENZTROPINE MESYLATE 1 MG: 1 TABLET ORAL at 21:12

## 2022-11-22 RX ADMIN — FENOFIBRATE 160 MG: 160 TABLET ORAL at 09:35

## 2022-11-22 RX ADMIN — HALOPERIDOL 20 MG: 5 TABLET ORAL at 09:34

## 2022-11-22 RX ADMIN — OXYCODONE 5 MG: 5 TABLET ORAL at 13:38

## 2022-11-22 RX ADMIN — INSULIN LISPRO 15 UNITS: 100 INJECTION, SOLUTION INTRAVENOUS; SUBCUTANEOUS at 12:31

## 2022-11-22 RX ADMIN — DIVALPROEX SODIUM 500 MG: 125 CAPSULE, COATED PELLETS ORAL at 13:33

## 2022-11-22 RX ADMIN — INSULIN LISPRO 12 UNITS: 100 INJECTION, SOLUTION INTRAVENOUS; SUBCUTANEOUS at 17:46

## 2022-11-22 ASSESSMENT — PAIN - FUNCTIONAL ASSESSMENT: PAIN_FUNCTIONAL_ASSESSMENT: PREVENTS OR INTERFERES SOME ACTIVE ACTIVITIES AND ADLS

## 2022-11-22 ASSESSMENT — PAIN DESCRIPTION - LOCATION: LOCATION: FOOT

## 2022-11-22 ASSESSMENT — ENCOUNTER SYMPTOMS
EYES NEGATIVE: 1
GASTROINTESTINAL NEGATIVE: 1
RESPIRATORY NEGATIVE: 1

## 2022-11-22 ASSESSMENT — PAIN DESCRIPTION - ORIENTATION: ORIENTATION: LEFT

## 2022-11-22 ASSESSMENT — PAIN DESCRIPTION - PAIN TYPE: TYPE: ACUTE PAIN

## 2022-11-22 ASSESSMENT — PAIN SCALES - GENERAL
PAINLEVEL_OUTOF10: 0
PAINLEVEL_OUTOF10: 7
PAINLEVEL_OUTOF10: 0

## 2022-11-22 ASSESSMENT — PAIN DESCRIPTION - FREQUENCY: FREQUENCY: CONTINUOUS

## 2022-11-22 ASSESSMENT — PAIN DESCRIPTION - ONSET: ONSET: ON-GOING

## 2022-11-22 ASSESSMENT — PAIN DESCRIPTION - DESCRIPTORS: DESCRIPTORS: ACHING;DISCOMFORT

## 2022-11-22 NOTE — PROGRESS NOTES
Podiatric Surgery Daily Progress Note  Jolynn Mohr      Subjective :   Patient seen and examined this am at the bedside. Patient denies any acute overnight events. Patient complains of 6/10 pain to the left lateral ankle. Patient denies N/V/F/C/SOB. Patient denies calf pain, thigh pain, chest pain. Review of Systems: A 12 point review of symptoms is unremarkable with the exception of the chief complaint. Patient specifically denies nausea, fever, vomiting, chills, shortness of breath, chest pain, abdominal pain, constipation or difficulty urinating. Objective     BP (!) 144/88   Pulse 60   Temp 97.7 °F (36.5 °C) (Axillary)   Resp 18   Ht 6' 1\" (1.854 m)   Wt 299 lb 3.2 oz (135.7 kg)   SpO2 95%   BMI 39.47 kg/m²      I/O:  Intake/Output Summary (Last 24 hours) at 11/22/2022 0609  Last data filed at 11/21/2022 0835  Gross per 24 hour   Intake 120 ml   Output --   Net 120 ml              Wt Readings from Last 3 Encounters:   11/21/22 299 lb 3.2 oz (135.7 kg)       LABS:    Recent Labs     11/20/22  0714 11/21/22  0916   WBC 7.9 6.4   HGB 11.5* 10.9*   HCT 35.2* 33.1*    147        Recent Labs     11/21/22  0917   *   K 3.6   CL 94*   CO2 30   BUN 15   CREATININE 1.2      No results for input(s): PROT, INR, APTT in the last 72 hours. LIMITED LOWER EXTREMITY EXAMINATION    Dressing to left LE intact. No strikethrough noted to the external dressing. Left lower extremity examination limited due to extensive dressing with posterior splint. Capillary fill time noted to be < 3 seconds to the exposed digits. With ROM of the digits appreciated. Pain on palpation to the left lower extremity. IMAGING:  XR b/l ankle:  Narrative   History: Status post fall with bilateral ankle pain. COMPARISON: None. Right ankle:       FINDINGS: AP, lateral, and mortise views of the right ankle were obtained. Alignment is anatomic. Normal joint spaces. No fracture or dislocation. Impression   1. No acute findings in the right ankle. Left ankle:       FINDINGS: AP, lateral, and mortise views of the left ankle were obtained. There is a nondisplaced comminuted fracture of the distal shaft of the left fibula with incomplete healing. There is abundant surrounding periosteal reaction and callus formation    with ossification extending into the interosseous membrane. No other fractures. IMPRESSION:   1. Comminuted nondisplaced incompletely healed fracture at the distal shaft of the left fibula. 2. Ossification extending from the distal shaft of the left fibula into the tibiofibular interosseous membrane likely the sequela of trauma. 3. No dislocation. CT left ankle:  Narrative   History: Comminuted fibular fracture. Left ankle pain. CT of the left ankle without contrast.       TECHNIQUE: CT images were obtained of the left ankle without contrast.       FINDINGS:       Extensive heterotopic ossification surrounding the lower fibula along the interosseous membrane and surrounding the lower tibia consistent with sequela of remote trauma possibly representing a remote interosseous ligament injury. There is incomplete    osseous bridging of the ligamenta space. The fibula demonstrates a comminuted what appears represent acute fracture distally with distraction of the distal fibular tip. There is adjacent soft tissue swelling. No dislocation. No radiopaque foreign body. Impression   1. 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 Superimposed acute distal fibular fracture with comminution and    distraction.                IMPRESSION/RECOMMENDATIONS:    -Comminuted fibular fracture( acute)- left ankle  -Syndesmotic ossification (chronic)-left ankle  -Left ankle pain  -Diabetes type II      -Patient examined and evaluated at the bedside   -Hypertensive, Hypothermic, otherwise VSS. 6.4 Leukocytosis noted.  -Labs and imaging reviewed with impressions noted above. -Posterior splint to the left lower extremity with modified rhoades compression left in place  -Patient to be non-weight bearing to the left lower extremity  -Crutches ordered and to be used for all ambulation  -No antibiotics warranted at this time. - Surgical intervention may be planned during this admission pending duration of planning for discharge placement and OR and surgeon availability. Patient will need open reduction with internal fixation of the left ankle either during this admission or as a scheduled out patient procedure. Ankle is stable in posterior splint and is to be worn at all times with strict non-weight bearing to the left lower extremity. -PT/OT consult placed for assistance with non-weight bearing LLE status and education of use of crutches. - Order placed for Percocet for pain control while the patient is in house. DISPO: Comminuted fibular fracture left lower extremity with syndesmotic involvement. Labs and imaging reviewed. Posterior splint applied and to be left in place until surgical intervention or  outpatient follow up can be established. Patient to be non-weightbearing to the left lower extremity with use of crutches. PT/OT consult placed.  Surgical intervention may be planned during this admission pending discharge planning and OR/surgeon availability.      -Assessment and plan discussed with Dr. Markus Hines DPM   Podiatric Resident PGY1  Pager [phone] or Rex  11/21/2022, 4:45 PM

## 2022-11-22 NOTE — PROGRESS NOTES
Progress Note    Admit Date: 11/15/2022  Day: 6  Diet: ADULT DIET; Regular; 3 carb choices (45 gm/meal)    CC: Fall, chest pain    Interval history:   Patient was doing okay this AM, reported no new symptoms, had no concerns or complaints. Says that L ankle pain is better. Podiatry okay with outpatient follow up with non-weightbearing and use of crutches for LLE. Placement in process as per case management. HPI:  Brianne Norris is 38 y.o M with pmhx of asthma, COPD on 2L baseline CHF, dementia, hypertension, cognitive impairment, schizoaffective disorder, type 2 diabetes with a fall. Patient has history of falls and was recently admitted for similar presentation. Patient is from [facility] facility and reportedly had a fall 2 days ago when he fell off from his bed. He started complaining of back pain today. Patient was not able to verbalize his needs well but he seemed to complain of chest and epigastric pain. In the ED, patient was mildly hyponatremic at 133, creatinine 1.4 baseline seems to be at 1.0, hyperglycemia at 443, troponin 0.1x2, mildly elevated alkaline phosphate 135 and ALT 64. Chest x-ray showed bibasilar atelectasis no pleural effusions or consolidations. EKG showed sinus tachycardia with J-point ST elevations. Patient was given aspirin 325 mg chewable and admitted for further evaluation.     Medications:     Scheduled Meds:   insulin lispro  15 Units SubCUTAneous TID WC    insulin glargine  65 Units SubCUTAneous BID    enoxaparin  30 mg SubCUTAneous BID    atorvastatin  80 mg Oral Nightly    benztropine  1 mg Oral BID    fenofibrate  160 mg Oral Daily    haloperidol  20 mg Oral TID    QUEtiapine  200 mg Oral 4x Daily    sodium chloride flush  5-40 mL IntraVENous 2 times per day    aspirin  81 mg Oral Daily    insulin lispro  0-16 Units SubCUTAneous TID WC    insulin lispro  0-4 Units SubCUTAneous Nightly    divalproex  500 mg Oral TID    furosemide  20 mg Oral Daily    pantoprazole  40 mg Oral Daily    carvedilol  50 mg Oral BID     Continuous Infusions:   sodium chloride      dextrose       PRN Meds:oxyCODONE, perflutren lipid microspheres, sodium chloride flush, sodium chloride, ondansetron **OR** ondansetron, polyethylene glycol, acetaminophen **OR** acetaminophen, labetalol, heparin (porcine), heparin (porcine), glucose, dextrose bolus **OR** dextrose bolus, glucagon (rDNA), dextrose    Objective:   Vitals:   T-max:  Patient Vitals for the past 8 hrs:   BP Temp Temp src Pulse Resp SpO2 Weight   11/22/22 0834 (!) 140/87 (!) 96.3 °F (35.7 °C) Axillary 67 17 96 % --   11/22/22 0611 -- -- -- -- -- -- (!) 300 lb 4.3 oz (136.2 kg)   11/22/22 0515 (!) 144/88 97.7 °F (36.5 °C) Axillary 60 18 95 % --       No intake or output data in the 24 hours ending 11/22/22 0925      Review of Systems   Constitutional: Negative. HENT: Negative. Eyes: Negative. Respiratory: Negative. Cardiovascular: Negative. Gastrointestinal: Negative. Genitourinary: Negative. Musculoskeletal: Negative. Skin: Negative. Neurological: Negative. Psychiatric/Behavioral: Negative. Physical Exam  Vitals reviewed. Constitutional:       General: He is awake. Appearance: Normal appearance. He is morbidly obese. HENT:      Head: Normocephalic and atraumatic. Eyes:      Extraocular Movements: Extraocular movements intact. Conjunctiva/sclera: Conjunctivae normal.      Pupils: Pupils are equal, round, and reactive to light. Cardiovascular:      Rate and Rhythm: Normal rate and regular rhythm. Heart sounds: Normal heart sounds. Pulmonary:      Effort: Pulmonary effort is normal.      Breath sounds: Normal breath sounds. Abdominal:      General: Abdomen is flat. Bowel sounds are normal.      Palpations: Abdomen is soft. Comments: Striae noted L abdomen   Musculoskeletal:         General: Normal range of motion. Skin:     General: Skin is warm and dry.    Neurological: General: No focal deficit present. Mental Status: He is alert and oriented to person, place, and time. Mental status is at baseline. Psychiatric:         Mood and Affect: Mood normal.         Behavior: Behavior normal. Behavior is cooperative. LABS:    CBC:   Recent Labs     11/20/22  0714 11/21/22  0916 11/22/22  0735   WBC 7.9 6.4 7.8   HGB 11.5* 10.9* 11.3*   HCT 35.2* 33.1* 34.4*    147 144   MCV 87.7 87.1 87.4       Renal:    Recent Labs     11/20/22  0714 11/21/22  0917 11/22/22  0735    133* 140   K 3.4* 3.6 4.0   CL 97* 94* 99   CO2 30 30 32   BUN 20 15 15   CREATININE 1.3 1.2 1.1   GLUCOSE 187* 312* 186*   CALCIUM 9.5 9.2 9.3   MG 2.00  --   --    ANIONGAP 10 9 9       Hepatic: No results for input(s): AST, ALT, BILITOT, BILIDIR, PROT, LABALBU, ALKPHOS in the last 72 hours. Troponin:   No results for input(s): TROPONINI in the last 72 hours. BNP: No results for input(s): BNP in the last 72 hours. Lipids: No results for input(s): CHOL, HDL in the last 72 hours. Invalid input(s): LDLCALCU, TRIGLYCERIDE  ABGs:  No results for input(s): PHART, TZC8THM, PO2ART, CRT4VKL, BEART, THGBART, Q0WCBMGS, WWW3YRV in the last 72 hours. INR: No results for input(s): INR in the last 72 hours. Lactate: No results for input(s): LACTATE in the last 72 hours. Cultures:  -----------------------------------------------------------------  RAD:   CT ANKLE LEFT WO CONTRAST   Final Result   1. 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 Superimposed acute distal fibular fracture with comminution and    distraction. XR ANKLE LEFT (MIN 3 VIEWS)   Final Result   1. No acute findings in the right ankle. Left ankle:      FINDINGS: AP, lateral, and mortise views of the left ankle were obtained.  There is a nondisplaced comminuted fracture of the distal shaft of the left fibula with incomplete healing. There is abundant surrounding periosteal reaction and callus formation    with ossification extending into the interosseous membrane. No other fractures. IMPRESSION:   1. Comminuted nondisplaced incompletely healed fracture at the distal shaft of the left fibula. 2. Ossification extending from the distal shaft of the left fibula into the tibiofibular interosseous membrane likely the sequela of trauma. 3. No dislocation. XR ANKLE RIGHT (MIN 3 VIEWS)   Final Result   1. No acute findings in the right ankle. Left ankle:      FINDINGS: AP, lateral, and mortise views of the left ankle were obtained. There is a nondisplaced comminuted fracture of the distal shaft of the left fibula with incomplete healing. There is abundant surrounding periosteal reaction and callus formation    with ossification extending into the interosseous membrane. No other fractures. IMPRESSION:   1. Comminuted nondisplaced incompletely healed fracture at the distal shaft of the left fibula. 2. Ossification extending from the distal shaft of the left fibula into the tibiofibular interosseous membrane likely the sequela of trauma. 3. No dislocation. CT HEAD WO CONTRAST   Final Result   1. No evidence for acute intracranial process. No bleed or shift   2. Paranasal sinus disease, there are areas of increased density noted within this disease process for which I cannot exclude a fungal etiology   3. Frontal temporal atrophy      CT ADRENALS W WO CONTRAST   Final Result      1. Diffuse symmetric nodular thickening of the adrenal glands bilaterally with the right adrenal gland measuring 7.2 cm in length and the left adrenal gland measuring 6.6 cm in length. The CT appearance is most consistent with diffuse nodular adrenal    hyperplasia rather than an adrenal mass. CT follow-up could be considered. 2. Diffuse fatty infiltration of liver.       XR CHEST (2 VW)   Final Result      Bibasilar atelectatic changes          Assessment/Plan:   Analilia Barajas is 38 y.o M with pmhx of asthma, COPD, CHF, dementia, hypertension, cognitive impairment, schizoaffective disorder, type 2 diabetes with a fall admitted for further evaluation. Chest/epigastric pain  Concern for ACS  Patient seen for complaints of fall of epigastric or chest pain   EKG per ED notes(which was not available in patient room) significant for  J-point ST elevation through precordial leads  -Troponins elevated at 0.10 X 2, follow-up troponinX3 was 0.09  -ok to dc from medicine standpoint [11/21]  -Continue aspirin 81 mg daily  -Continue Lipitor 80 mg nightly  -Heparin drip d/c'd 11/17  -LHC showed normal coronary arteries       VAHE-resolved  Patient's creatinine on presentation was 1.4. His baseline appears to be around 1.1  -Monitor RFP     T2DM  Patient's blood sugars have been in the 300-500 status today. He likes to eat a lot of snacks and is always asking for more food. -A1c was 10% in July 2022, follow-up A1c on 11/15 12.6  -Lantus 65 U BID  - Lispro 15 units 3x daily with meals  -High-dose sliding scale  -PCOT every 4 hours  -Hypoglycemia protocol  -Dietitian consulted        Chronic medical conditions  COPD (not in exacerbation)  CHF  -Patient does not appear volume overloaded but massive distended abdomen. -2D echo showed ejection fraction estimated to be 50%, intermediate diastolic function, cannot exclude regional wall motion abnormality secondary to poor endocardial visualization.  -Monitor daily standing weight  -20mg PO lasix daily  -Monitor daily electrolytes and BMP replace as needed  Essential hypertension  -Carvedilol 50 mg twice daily  -Labetalol 10 mg as needed  Schizoaffective disorder  -Holding home olanzapine.  Continue benztropine, depakote, haldol, seroquel    Code Status: Full code  FEN: Diabetic diet  PPX: Lovenox  DISPO: F Saintclair Ferries, MD PGY-1  11/22/22  9:25 AM    This patient will be staffed and discussed with Femi Christensen MD.

## 2022-11-22 NOTE — PROGRESS NOTES
Nephrology Note                                                                                                                                                                                                                                                                                                                                                               Office : [phone]     Fax :[phone]              Patient's Name: Nancy Bassett      Reason for Consult:  r/o Cushing syndrome   Requesting Physician:  Karla Gray MD      No new change   Good UO   Lytes in good range        reports that he has been smoking cigarettes. He has never used smokeless tobacco. He reports that he does not currently use drugs after having used the following drugs: Marijuana Felton Calamity). He reports that he does not drink alcohol. Allergies:  Patient has no known allergies.     Current Medications:    oxyCODONE (ROXICODONE) immediate release tablet 5 mg, Q4H PRN  insulin lispro (1 Unit Dial) (HUMALOG/ADMELOG) pen 15 Units, TID WC  insulin glargine (LANTUS;BASAGLAR) injection pen 65 Units, BID  enoxaparin Sodium (LOVENOX) injection 30 mg, BID  atorvastatin (LIPITOR) tablet 80 mg, Nightly  benztropine (COGENTIN) tablet 1 mg, BID  fenofibrate (TRIGLIDE) tablet 160 mg, Daily  haloperidol (HALDOL) tablet 20 mg, TID  QUEtiapine (SEROQUEL) tablet 200 mg, 4x Daily  perflutren lipid microspheres (DEFINITY) injection 1.5 mL, ONCE PRN  sodium chloride flush 0.9 % injection 5-40 mL, 2 times per day  sodium chloride flush 0.9 % injection 5-40 mL, PRN  0.9 % sodium chloride infusion, PRN  ondansetron (ZOFRAN-ODT) disintegrating tablet 4 mg, Q8H PRN   Or  ondansetron (ZOFRAN) injection 4 mg, Q6H PRN  polyethylene glycol (GLYCOLAX) packet 17 g, Daily PRN  acetaminophen (TYLENOL) tablet 650 mg, Q6H PRN   Or  acetaminophen (TYLENOL) suppository 650 mg, Q6H PRN  labetalol (NORMODYNE;TRANDATE) injection 10 mg, Q4H PRN  aspirin chewable tablet 81 mg, Daily  insulin lispro (1 Unit Dial) (HUMALOG/ADMELOG) pen 0-16 Units, TID WC  insulin lispro (1 Unit Dial) (HUMALOG/ADMELOG) pen 0-4 Units, Nightly  divalproex (DEPAKOTE SPRINKLE) capsule 500 mg, TID  furosemide (LASIX) tablet 20 mg, Daily  pantoprazole (PROTONIX) tablet 40 mg, Daily  heparin (porcine) injection 4,000 Units, PRN  heparin (porcine) injection 2,000 Units, PRN  glucose chewable tablet 16 g, PRN  dextrose bolus 10% 125 mL, PRN   Or  dextrose bolus 10% 250 mL, PRN  glucagon (rDNA) injection 1 mg, PRN  dextrose 10 % infusion, Continuous PRN  carvedilol (COREG) tablet 50 mg, BID      Physical exam:     Vitals:  BP (!) 140/87   Pulse 67   Temp (!) 96.3 °F (35.7 °C) (Axillary)   Resp 17   Ht 6' 1\" (1.854 m)   Wt (!) 300 lb 4.3 oz (136.2 kg)   SpO2 96%   BMI 39.62 kg/m²   Constitutional:  OAA X3 NAD  Skin: striae   Heent:  eomi, mmm  Neck: no bruits or jvd noted  Cardiovascular:  S1, S2 without m/r/g  Respiratory: CTA B without w/r/r  Abdomen:  +bs, soft, nt, nd  Ext: ++ lower extremity edema  Psychiatric: mood and affect appropriate  Musculoskeletal:  Rom, muscular strength intact    Data:   Labs:  CBC:   Recent Labs     11/20/22  0714 11/21/22  0916 11/22/22  0735   WBC 7.9 6.4 7.8   HGB 11.5* 10.9* 11.3*    147 144       BMP:    Recent Labs     11/20/22  0714 11/21/22  0917 11/22/22  0735    133* 140   K 3.4* 3.6 4.0   CL 97* 94* 99   CO2 30 30 32   BUN 20 15 15   CREATININE 1.3 1.2 1.1   GLUCOSE 187* 312* 186*       Ca/Mg/Phos:   Recent Labs     11/20/22  0714 11/21/22  0917 11/22/22  0735   CALCIUM 9.5 9.2 9.3   MG 2.00  --   --        Hepatic: No results for input(s): AST, ALT, ALB, BILITOT, ALKPHOS in the last 72 hours. Troponin: No results for input(s): TROPONINI in the last 72 hours. BNP: No results for input(s): BNP in the last 72 hours. Lipids: No results for input(s): CHOL, TRIG, HDL, LDLCALC, LABVLDL in the last 72 hours.   ABGs: No results for input(s): PHART, PO2ART, ISF2XWQ in the last 72 hours. INR: No results for input(s): INR in the last 72 hours. UA:No results for input(s): Susa Merry, GLUCOSEU, BILIRUBINUR, Michael Jacob, BLOODU, PHUR, PROTEINU, UROBILINOGEN, NITRU, LEUKOCYTESUR, LABMICR, URINETYPE in the last 72 hours. Urine Microscopic: No results for input(s): LABCAST, BACTERIA, COMU, HYALCAST, WBCUA, RBCUA, EPIU in the last 72 hours. Urine Culture: No results for input(s): LABURIN in the last 72 hours. Urine Chemistry: No results for input(s): Doneen Nanny, PROTEINUR, NAUR in the last 72 hours. IMAGING:  CT ANKLE LEFT WO CONTRAST   Final Result   1. 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 Superimposed acute distal fibular fracture with comminution and    distraction. XR ANKLE LEFT (MIN 3 VIEWS)   Final Result   1. No acute findings in the right ankle. Left ankle:      FINDINGS: AP, lateral, and mortise views of the left ankle were obtained. There is a nondisplaced comminuted fracture of the distal shaft of the left fibula with incomplete healing. There is abundant surrounding periosteal reaction and callus formation    with ossification extending into the interosseous membrane. No other fractures. IMPRESSION:   1. Comminuted nondisplaced incompletely healed fracture at the distal shaft of the left fibula. 2. Ossification extending from the distal shaft of the left fibula into the tibiofibular interosseous membrane likely the sequela of trauma. 3. No dislocation. XR ANKLE RIGHT (MIN 3 VIEWS)   Final Result   1. No acute findings in the right ankle. Left ankle:      FINDINGS: AP, lateral, and mortise views of the left ankle were obtained. There is a nondisplaced comminuted fracture of the distal shaft of the left fibula with incomplete healing.  There is abundant surrounding periosteal reaction and callus formation    with ossification extending into the interosseous membrane. No other fractures. IMPRESSION:   1. Comminuted nondisplaced incompletely healed fracture at the distal shaft of the left fibula. 2. Ossification extending from the distal shaft of the left fibula into the tibiofibular interosseous membrane likely the sequela of trauma. 3. No dislocation. CT HEAD WO CONTRAST   Final Result   1. No evidence for acute intracranial process. No bleed or shift   2. Paranasal sinus disease, there are areas of increased density noted within this disease process for which I cannot exclude a fungal etiology   3. Frontal temporal atrophy      CT ADRENALS W WO CONTRAST   Final Result      1. Diffuse symmetric nodular thickening of the adrenal glands bilaterally with the right adrenal gland measuring 7.2 cm in length and the left adrenal gland measuring 6.6 cm in length. The CT appearance is most consistent with diffuse nodular adrenal    hyperplasia rather than an adrenal mass. CT follow-up could be considered. 2. Diffuse fatty infiltration of liver. XR CHEST (2 VW)   Final Result      Bibasilar atelectatic changes          Assessment/Plan   Obesity     2. HTN    3. Anemia    4. Acid- base/ Electrolyte imbalance     5.  Hypokalemia     Plan     Pt has symptoms of hypercortisolism   Cortisol 28.4  CT Adrenal  - Hyperplasia   ACTH pending      Will need   24 hours UFC   Salivary cortisol level   Dexa supp test       Need out pt follow up   [facility] pin[facility] d.c today               Thank you for allowing us to participate in care of Clive Douglas MD  Feel free to contact me   [facility] [address]  Office : [phone]  Fax :[phone]

## 2022-11-22 NOTE — CARE COORDINATION
CM left another vm for guardian, Amy Pearce to update that pt has DC order in and [facility] can accept as alternative LTC. Electronically signed by CAROLINE Pettit LSW on 11/22/2022 at 12:51 PM  ________________________________________________________________________________________            Savage Michelle from [facility] reported that pt was denied due to needing a secure behavioral unit. She recommended [facility] or [facility], who have these units. CM sent referrals and left vm for pt's guardian, Amy Pearce to update. Per Podiatry note, possible surgical intervention pending DC planning & OR/surgeon availability.               Electronically signed by CAROLINE Pettit LSW on 11/22/2022 at 9:19 AM

## 2022-11-22 NOTE — PLAN OF CARE
Due to difficulty with ambulation in the posterior splint, patient may weight bear in a knee high walking boot only to the left lower extremity.      Boyd Serna DPM   Podiatric Resident PGY1  Pager [phone] or Rex  11/22/2022, 12:56 PM

## 2022-11-22 NOTE — PROGRESS NOTES
Nephrology Note                                                                                                                                                                                                                                                                                                                                                               Office : [phone]     Fax :[phone]              Patient's Name: Brady Elizondo      Reason for Consult:  r/o Cushing syndrome   Requesting Physician:  MD JOSELYN Alarcon low   Na on low side   BP in decent range        reports that he has been smoking cigarettes. He has never used smokeless tobacco. He reports that he does not currently use drugs after having used the following drugs: Marijuana Brown Hail). He reports that he does not drink alcohol. Allergies:  Patient has no known allergies.     Current Medications:    insulin lispro (1 Unit Dial) (HUMALOG/ADMELOG) pen 15 Units, TID WC  insulin glargine (LANTUS;BASAGLAR) injection pen 65 Units, BID  enoxaparin Sodium (LOVENOX) injection 30 mg, BID  atorvastatin (LIPITOR) tablet 80 mg, Nightly  benztropine (COGENTIN) tablet 1 mg, BID  fenofibrate (TRIGLIDE) tablet 160 mg, Daily  haloperidol (HALDOL) tablet 20 mg, TID  QUEtiapine (SEROQUEL) tablet 200 mg, 4x Daily  perflutren lipid microspheres (DEFINITY) injection 1.5 mL, ONCE PRN  sodium chloride flush 0.9 % injection 5-40 mL, 2 times per day  sodium chloride flush 0.9 % injection 5-40 mL, PRN  0.9 % sodium chloride infusion, PRN  ondansetron (ZOFRAN-ODT) disintegrating tablet 4 mg, Q8H PRN   Or  ondansetron (ZOFRAN) injection 4 mg, Q6H PRN  polyethylene glycol (GLYCOLAX) packet 17 g, Daily PRN  acetaminophen (TYLENOL) tablet 650 mg, Q6H PRN   Or  acetaminophen (TYLENOL) suppository 650 mg, Q6H PRN  labetalol (NORMODYNE;TRANDATE) injection 10 mg, Q4H PRN  aspirin chewable tablet 81 mg, Daily  insulin lispro (1 Unit Dial) (HUMALOG/ADMELOG) pen 0-16 Units, TID WC  insulin lispro (1 Unit Dial) (HUMALOG/ADMELOG) pen 0-4 Units, Nightly  divalproex (DEPAKOTE SPRINKLE) capsule 500 mg, TID  furosemide (LASIX) tablet 20 mg, Daily  pantoprazole (PROTONIX) tablet 40 mg, Daily  heparin (porcine) injection 4,000 Units, PRN  heparin (porcine) injection 2,000 Units, PRN  glucose chewable tablet 16 g, PRN  dextrose bolus 10% 125 mL, PRN   Or  dextrose bolus 10% 250 mL, PRN  glucagon (rDNA) injection 1 mg, PRN  dextrose 10 % infusion, Continuous PRN  carvedilol (COREG) tablet 50 mg, BID      Physical exam:     Vitals:  BP (!) 151/87   Pulse 80   Temp 97.7 °F (36.5 °C) (Oral)   Resp 18   Ht 6' 1\" (1.854 m)   Wt 299 lb 3.2 oz (135.7 kg)   SpO2 96%   BMI 39.47 kg/m²   Constitutional:  OAA X3 NAD  Skin: striae   Heent:  eomi, mmm  Neck: no bruits or jvd noted  Cardiovascular:  S1, S2 without m/r/g  Respiratory: CTA B without w/r/r  Abdomen:  +bs, soft, nt, nd  Ext: ++ lower extremity edema  Psychiatric: mood and affect appropriate  Musculoskeletal:  Rom, muscular strength intact    Data:   Labs:  CBC:   Recent Labs     11/19/22  0810 11/20/22  0714 11/21/22  0916   WBC 8.4 7.9 6.4   HGB 11.9* 11.5* 10.9*    154 147       BMP:    Recent Labs     11/19/22  0810 11/20/22  0714 11/21/22  0917   * 137 133*   K 4.7 3.4* 3.6   CL 89* 97* 94*   CO2 29 30 30   BUN 24* 20 15   CREATININE 1.4* 1.3 1.2   GLUCOSE 341* 187* 312*       Ca/Mg/Phos:   Recent Labs     11/19/22  0810 11/20/22  0714 11/21/22  0917   CALCIUM 9.9 9.5 9.2   MG  --  2.00  --        Hepatic: No results for input(s): AST, ALT, ALB, BILITOT, ALKPHOS in the last 72 hours. Troponin: No results for input(s): TROPONINI in the last 72 hours. BNP: No results for input(s): BNP in the last 72 hours. Lipids: No results for input(s): CHOL, TRIG, HDL, LDLCALC, LABVLDL in the last 72 hours. ABGs: No results for input(s): PHART, PO2ART, OSH6HFD in the last 72 hours.   INR: No results for input(s): INR in the last 72 hours. UA:No results for input(s): Popeye Pears, GLUCOSEU, BILIRUBINUR, Chip Nares, BLOODU, PHUR, PROTEINU, UROBILINOGEN, NITRU, LEUKOCYTESUR, LABMICR, URINETYPE in the last 72 hours. Urine Microscopic: No results for input(s): LABCAST, BACTERIA, COMU, HYALCAST, WBCUA, RBCUA, EPIU in the last 72 hours. Urine Culture: No results for input(s): LABURIN in the last 72 hours. Urine Chemistry: No results for input(s): Lunda Flair, PROTEINUR, NAUR in the last 72 hours. IMAGING:  CT ANKLE LEFT WO CONTRAST   Final Result   1. 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 Superimposed acute distal fibular fracture with comminution and    distraction. XR ANKLE LEFT (MIN 3 VIEWS)   Final Result   1. No acute findings in the right ankle. Left ankle:      FINDINGS: AP, lateral, and mortise views of the left ankle were obtained. There is a nondisplaced comminuted fracture of the distal shaft of the left fibula with incomplete healing. There is abundant surrounding periosteal reaction and callus formation    with ossification extending into the interosseous membrane. No other fractures. IMPRESSION:   1. Comminuted nondisplaced incompletely healed fracture at the distal shaft of the left fibula. 2. Ossification extending from the distal shaft of the left fibula into the tibiofibular interosseous membrane likely the sequela of trauma. 3. No dislocation. XR ANKLE RIGHT (MIN 3 VIEWS)   Final Result   1. No acute findings in the right ankle. Left ankle:      FINDINGS: AP, lateral, and mortise views of the left ankle were obtained. There is a nondisplaced comminuted fracture of the distal shaft of the left fibula with incomplete healing.  There is abundant surrounding periosteal reaction and callus formation    with ossification extending into the interosseous membrane. No other fractures. IMPRESSION:   1. Comminuted nondisplaced incompletely healed fracture at the distal shaft of the left fibula. 2. Ossification extending from the distal shaft of the left fibula into the tibiofibular interosseous membrane likely the sequela of trauma. 3. No dislocation. CT HEAD WO CONTRAST   Final Result   1. No evidence for acute intracranial process. No bleed or shift   2. Paranasal sinus disease, there are areas of increased density noted within this disease process for which I cannot exclude a fungal etiology   3. Frontal temporal atrophy      CT ADRENALS W WO CONTRAST   Final Result      1. Diffuse symmetric nodular thickening of the adrenal glands bilaterally with the right adrenal gland measuring 7.2 cm in length and the left adrenal gland measuring 6.6 cm in length. The CT appearance is most consistent with diffuse nodular adrenal    hyperplasia rather than an adrenal mass. CT follow-up could be considered. 2. Diffuse fatty infiltration of liver. XR CHEST (2 VW)   Final Result      Bibasilar atelectatic changes          Assessment/Plan   Obesity     2. HTN    3. Anemia    4. Acid- base/ Electrolyte imbalance     5.  Hypokalemia     Plan     Pt has symptoms of hypercortisolism   Cortisol 28.4  CT Adrenal  - Hyperplasia   ACTH pending      Will need   24 hours UFC   Salivary cortisol level   Dexa supp test       Need out pt follow up   [facility] to d.c today               Thank you for allowing us to participate in care of Ej Wild MD  Feel free to contact me   [facility] [address]  Office : [phone]  Fax :[phone]

## 2022-11-22 NOTE — PROGRESS NOTES
Occupational Therapy  Facility/Department: [address]  Occupational Therapy Initial Assessment and Treatment Note     Name: Levar Castillo  : 1980  MRN: [id number]  Date of Service: 2022    Discharge Recommendations:Leticia Vasquez scored a 16/24 on the [address] form. Current research shows that an AM-PAC score of 17 or less is typically not associated with a discharge to the patient's home setting. Based on the patient's AM-PAC score and their current ADL deficits, it is recommended that the patient have 3-5 sessions per week of Occupational Therapy at d/c to increase the patient's independence. Please see assessment section for further patient specific details. If patient discharges prior to next session this note will serve as a discharge summary. Please see below for the latest assessment towards goals. If patient discharges prior to next session this note will serve as a discharge summary. Please see below for the latest assessment towards goals. OT Equipment Recommendations  Equipment Needed: No  Other: defer to NH / LTC       Patient Diagnosis(es): The primary encounter diagnosis was Troponin level elevated. A diagnosis of Hyperglycemia was also pertinent to this visit. Past Medical History:  has no past medical history on file. Past Surgical History:  has no past surgical history on file. Treatment Diagnosis: chest pain / + L distal fibular fx      Assessment   Performance deficits / Impairments: Decreased functional mobility ; Decreased ADL status  Assessment: Pt from LTC - pt unable to provide reliable hx. Pt reports was ambulatory / independent  at baseline. Pt limited by meds/ poor mentation / carryover of learning and insight. Pt unable to demo / maintain NWB despite max v.cues / physical cues. Attempted sit / stand pivot to w/c , Stand hop with walker - unable to perform w/o BLE WB. Mod / Max assist / cues for LE ADLs / simple tasks.  Will follow as inpt to maximize functional level. Plan for LTC at [facility] unit at d/c. Treatment Diagnosis: chest pain / + L distal fibular fx  Prognosis: Fair  Decision Making: Medium Complexity  REQUIRES OT FOLLOW-UP: Yes  Activity Tolerance  Activity Tolerance: Patient Tolerated treatment well  Activity Tolerance Comments: flat affect / slow responses        Plan   Occupational Therapy Plan  Times Per Week: 2-5x  Times Per Day: Once a day  Current Treatment Recommendations: Self-Care / ADL, Safety education & training, Functional mobility training, Endurance training, Equipment evaluation, education, & procurement, Cognitive reorientation     Restrictions  Position Activity Restriction  Other position/activity restrictions: up with assist, NWB L LE (Simultaneous filing. User may not have seen previous data.)    Subjective   General  Chart Reviewed: Yes  Additional Pertinent Hx: Admit 11/15 Hyperglycemic. Hospital Course: CXR: bibasilar atelectatic changes; Bilateral ankle xray- Comminuted nondisplaced incompletely healed fracture at the distal shaft of the left fibula, CT L ankle - Superimposed acute distal fibular fracture with comminution and   distraction. Podiatry consult - NWB LLE                                                  PMHX: asthma, COPD, CHF, dementia, hypertension, cognitive impairment, schizoaffective disorder, type 2 diabetes- lives in a group home  Family / Caregiver Present: No  Referring Practitioner: Dr. Lori Eller  Diagnosis: Hyperglycemic, L distal fibular fx on 11/21  Subjective  Subjective: \" I can't do it  \" Pt found resting in bed - poor historian - agreeable for OOB/OT eval and tx.  Pt unable to demo NWB LLE  despite max v.cues     Social/Functional History  Social/Functional History  Type of Home: Facility  Home Layout: One level  Home Access: Level entry  Bathroom Shower/Tub: Tub/Shower unit  Bathroom Toilet: Standard  Home Equipment:  (none)  ADL Assistance: Independent  Homemaking Responsibilities: No  Ambulation Assistance: Independent  Transfer Assistance: Independent  Active : No  Occupation: On disability  Additional Comments: Pt living in NH - for mental health issues. Very poor historian unable provide /clarify WB level at [facility] [place]       Objective Treatment included functional transfer training, ADL's and pt. education. Safety Devices  Type of Devices: All fall risk precautions in place; Chair alarm in place;Call light within reach;Nurse notified; Left in chair  Wheelchair Management  Wheelchair Management: Yes  Overall Level of Assistance: Moderate assistance (pt dragging RLE on floor with BUE pushing wheels. Pt unable to demo using RLE for self propulsion.)  Interventions: Verbal cues  Distance (ft): 60 Feet  Toilet Transfers  Toilet - Technique: Stand pivot;Stand step  Equipment Used: Standard bedside commode  Toilet Transfer: Contact guard assistance  Toilet Transfers Comments: Attempted to instruct pt on NWB LLE - unable to demo / attempt -- standing BLE w/o warning  AROM: Within functional limits  Strength: Within functional limits  Coordination: Within functional limits  Tone: Normal  Sensation: Intact  ADL  Feeding: Setup  Grooming: Setup;Verbal cueing  Grooming Skilled Clinical Factors: cues for initiation of task or all ADLs / activity  LE Dressing: Moderate assistance  Toileting: Moderate assistance  Toileting Skilled Clinical Factors: with attempts to use urinal - pt refused to attempt in sitting - despite cue for NWB LLE - pt stood to urinate. Additional Comments: cues for initiation of task or all ADLs / activity this date. ? baseline mentation.      Activity Tolerance  Activity Tolerance: Patient tolerated treatment well;Treatment limited secondary to decreased cognition  Bed mobility  Supine to Sit: Supervision  Scooting: Supervision  Transfers  Sit to stand: Stand by assistance  Stand to sit: Stand by assistance  Transfer Comments: Pt not able to keep LLE off loaded with attempts x 3 - frustrated and stance BLE against advice.   Vision - Basic Assessment  Prior Vision: No visual deficits  Vision  Vision: Within Functional Limits  Hearing  Hearing: Within functional limits  Cognition  Overall Cognitive Status: Exceptions  Arousal/Alertness: Delayed responses to stimuli  Following Commands: Inconsistently follows commands  Attention Span: Difficulty attending to directions  Memory: Decreased short term memory;Decreased long term memory  Safety Judgement: Decreased awareness of need for safety;Decreased awareness of need for assistance  Problem Solving: Assistance required to implement solutions;Assistance required to correct errors made;Assistance required to identify errors made  Insights: Not aware of deficits  Initiation: Requires cues for some  Sequencing: Requires cues for all  Orientation  Overall Orientation Status:  (oriented to name/ and year only)      Education Given To: Patient  Education Provided: Role of Therapy;Plan of Care;Precautions;Transfer Training;ADL Adaptive Strategies  Education Method: Demonstration;Verbal  Barriers to Learning: Cognition  Education Outcome: Unable to demonstrate understanding;Continued education needed     AM-PAC Score  AM-[facility] Inpatient Daily Activity Raw Score: 16 (22 1204)  AM-PAC Inpatient ADL T-Scale Score : 35.96 (22 1204)  ADL Inpatient CMS 0-100% Score: 53.32 (22 1204)  ADL Inpatient CMS G-Code Modifier : CK (22)    Goals  Short Term Goals  Time Frame for Short Term Goals: at d/c  Short Term Goal 1: Sit to stand with CGA -maintaining NWB LLE  Short Term Goal 2: LE Dressing with SBA  Short Term Goal 3: Commode transfers with CGA - NWB LLE  Patient Goals   Patient goals : unable to state     Therapy Time   Individual Concurrent Group Co-treatment   Time In 1112         Time Out 1205         Minutes 53           Timed Code Treatment Minutes:  38 mins     Total Treatment Minutes: 48 mins         Katy Carter, OT

## 2022-11-22 NOTE — PLAN OF CARE
Problem: Pain  Goal: Verbalizes/displays adequate comfort level or baseline comfort level  11/22/2022 1545 by Chip Buckner RN  Outcome: Progressing  Patient complains of pain to his ankle. Patient was given Oxycodone for pain.   Pain was relieved

## 2022-11-22 NOTE — PROGRESS NOTES
Physical Therapy  Facility/Department: [facility]  Physical Therapy Initial Assessment    Name: Harjit Ann  : 1980  MRN: [id number]  Date of Service: 2022    Discharge Recommendations:Leticia Omer scored a  on the AM-PAC short mobility form. Current research shows that an AM-PAC score of 17 or less is typically not associated with a discharge to the patient's home setting. Based on the patient's AM-PAC score and their current functional mobility deficits, it is recommended that the patient have 3-5 sessions per week of Physical Therapy at d/c to increase the patient's independence. Please see assessment section for further patient specific details. If patient discharges prior to next session this note will serve as a discharge summary. Please see below for the latest assessment towards goals. PT Equipment Recommendations  Equipment Needed: Yes (bariatric wheelchair and std walker)      Patient Diagnosis(es): The primary encounter diagnosis was Troponin level elevated. A diagnosis of Hyperglycemia was also pertinent to this visit. Past Medical History:  has no past medical history on file. Past Surgical History:  has no past surgical history on file. Assessment   Assessment: 39 yo admitted 11/15/22 for chest pain and found to have L ankle FX and is now NWB L. Pt demo mobility well below reported baseline of independent ambulator at LTC (lives there for psychiatric reasons). Pt unable to follow NWB L and discussed with Dr. Jona Young; pt was unable to transfer/walk with WB restriction and he would benefit from a \"plan B\" for safest mobility. Pt trialed with wheelchair and he was unable to coordinate using B UE and R LE without mod assist/max vc. Per CM notes, pt is pending return to LTC facility at discharge. Podiatry to advise going forward what is safest for WB restriction. Pt would benefit from continued PT trial at LTC to address WB restriction L LE.  Pt will need bariatric DME (wheelchair, walker) as he will be room bound-he may need to be at wheelchair level and limited transfers for now. Treatment Diagnosis: mobility impairment due to L ankle fx  Decision Making: Medium Complexity  Requires PT Follow-Up: Yes  Activity Tolerance  Activity Tolerance: Patient tolerated treatment well;Treatment limited secondary to decreased cognition     Plan   Physcial Therapy Plan  General Plan:  (2-5)  Current Treatment Recommendations: Functional mobility training, Transfer training  Safety Devices  Type of Devices: All fall risk precautions in place, Chair alarm in place, Call light within reach, Nurse notified, Left in chair     Restrictions  Position Activity Restriction  Other position/activity restrictions: up with assist, NWB L LE (Simultaneous filing. User may not have seen previous data.)     Subjective   General  Chart Reviewed: Yes  Additional Pertinent Hx: 38 y.o M with pmhx of asthma, COPD on 2L baseline CHF, dementia, hypertension, cognitive impairment, schizoaffective disorder, type 2 diabetes presented to ED 11/15/22 with a fall. Cardio consult pending; elevated troponins; chest pain with concern for ACS-cath lab 11/17; podiatry consult for L ankle pain-NWB L due to Comminuted fibular fracture per L ankle XR. Family / Caregiver Present: No  Diagnosis: chest pain/L ankle Fx  Follows Commands: Impaired  Subjective  Subjective: Pt found supine in bed and agreeable to PT. Pt denies pain. Social/Functional History  Social/Functional History  Type of Home: Facility  Home Layout: One level  Home Access: Level entry  Bathroom Shower/Tub: Tub/Shower unit  Bathroom Toilet: Standard  Home Equipment:  (none)  ADL Assistance: Independent  Homemaking Responsibilities: No  Ambulation Assistance: Independent  Transfer Assistance: Independent  Active : No  Occupation: On disability  Additional Comments: Pt living in NH - for mental health issues.  Very poor historian unable provide /clarify WB level at [facility]    Vision/Hearing  Vision  Vision: Within Functional Limits  Hearing  Hearing: Within functional limits      Cognition   Orientation  Overall Orientation Status:  (oriented to name/ and year only)  Cognition  Overall Cognitive Status: Exceptions  Arousal/Alertness: Delayed responses to stimuli  Following Commands: Inconsistently follows commands  Attention Span: Difficulty attending to directions  Memory: Decreased short term memory;Decreased long term memory  Safety Judgement: Decreased awareness of need for safety;Decreased awareness of need for assistance  Problem Solving: Assistance required to implement solutions;Assistance required to correct errors made;Assistance required to identify errors made  Insights: Not aware of deficits  Initiation: Requires cues for some  Sequencing: Requires cues for all     Objective                 AROM RLE (degrees)  RLE AROM: WFL  AROM LLE (degrees)  LLE AROM :  (hip/ankle WFL; ankle NT due to splint)    Strength RLE  Strength RLE: WFL  Strength LLE  Strength LLE: WFL (L ankle NT due to splint)        Wheelchair Management  Wheelchair Management: Yes  Overall Level of Assistance: Moderate assistance (pt dragging RLE on floor with BUE pushing wheels. Pt unable to demo using RLE for self propulsion.)  Interventions: Verbal cues  Distance (ft): 60 Feet     Transfers  Comment: Pt unable to following NWB L despite max vc and education. Attempted with bariatric standard walker and attempted pivot transfer; pt just stands up and walks on L foot. Wheelchair Activities  Propulsion: Yes (Pt assisted to bariatric wheelchair and able to propel 50ft with B UE or R LE but not both together. Pt able to put brakes off/on with vc to initiate. Pt unable to demo parts management.)     Balance  Sitting - Static: Good  Sitting - Dynamic: Good  Comments: Pt unable to stand NWB L despite max education (both verbal and visual).          AM-PAC Score  AM-PAC Inpatient Mobility Raw Score : 12 (11/22/22 1215)  AM-PAC Inpatient T-Scale Score : 35.33 (11/22/22 1215)  Mobility Inpatient CMS 0-100% Score: 68.66 (11/22/22 1215)  Mobility Inpatient CMS G-Code Modifier : CL (11/22/22 1215)          Goals  Short Term Goals  Time Frame for Short Term Goals: discharge  Short Term Goal 1: sit to/from stand with bariatric walker NWB L min assist  Short Term Goal 2: pivot to/from chair NWB L min assist  Patient Goals   Patient Goals : unable to state       Education  Patient Education  Education Given To: Patient  Education Provided: Role of Therapy (NWB L)  Education Method: Verbal;Demonstration  Barriers to Learning: Cognition  Education Outcome: Unable to verbalize;Continued education needed      Therapy Time   Individual Concurrent Group Co-treatment   Time In 1100         Time Out 1215         Minutes 75          Timed Code Treatment Minutes: 65      Total Treatment Minutes:   [address], PT

## 2022-11-23 VITALS
HEART RATE: 78 BPM | HEIGHT: 73 IN | RESPIRATION RATE: 18 BRPM | WEIGHT: 311 LBS | SYSTOLIC BLOOD PRESSURE: 151 MMHG | BODY MASS INDEX: 41.22 KG/M2 | OXYGEN SATURATION: 98 % | DIASTOLIC BLOOD PRESSURE: 78 MMHG | TEMPERATURE: 98 F

## 2022-11-23 LAB
ABO/RH: NORMAL
ANION GAP SERPL CALCULATED.3IONS-SCNC: 10 MMOL/L (ref 3–16)
ANION GAP SERPL CALCULATED.3IONS-SCNC: 11 MMOL/L (ref 3–16)
ANTIBODY SCREEN: NORMAL
BASOPHILS ABSOLUTE: 0 K/UL (ref 0–0.2)
BASOPHILS RELATIVE PERCENT: 0 %
BUN BLDV-MCNC: 18 MG/DL (ref 7–20)
BUN BLDV-MCNC: 18 MG/DL (ref 7–20)
CALCIUM SERPL-MCNC: 9.3 MG/DL (ref 8.3–10.6)
CALCIUM SERPL-MCNC: 9.3 MG/DL (ref 8.3–10.6)
CHLORIDE BLD-SCNC: 96 MMOL/L (ref 99–110)
CHLORIDE BLD-SCNC: 96 MMOL/L (ref 99–110)
CO2: 30 MMOL/L (ref 21–32)
CO2: 30 MMOL/L (ref 21–32)
CREAT SERPL-MCNC: 1.2 MG/DL (ref 0.9–1.3)
CREAT SERPL-MCNC: 1.2 MG/DL (ref 0.9–1.3)
EKG ATRIAL RATE: 87 BPM
EKG DIAGNOSIS: NORMAL
EKG P AXIS: 65 DEGREES
EKG P-R INTERVAL: 138 MS
EKG Q-T INTERVAL: 372 MS
EKG QRS DURATION: 104 MS
EKG QTC CALCULATION (BAZETT): 447 MS
EKG R AXIS: 23 DEGREES
EKG T AXIS: -10 DEGREES
EKG VENTRICULAR RATE: 87 BPM
EOSINOPHILS ABSOLUTE: 0 K/UL (ref 0–0.6)
EOSINOPHILS RELATIVE PERCENT: 0 %
GFR SERPL CREATININE-BSD FRML MDRD: >60 ML/MIN/{1.73_M2}
GFR SERPL CREATININE-BSD FRML MDRD: >60 ML/MIN/{1.73_M2}
GLUCOSE BLD-MCNC: 122 MG/DL (ref 70–99)
GLUCOSE BLD-MCNC: 127 MG/DL (ref 70–99)
GLUCOSE BLD-MCNC: 155 MG/DL (ref 70–99)
GLUCOSE BLD-MCNC: 168 MG/DL (ref 70–99)
HCT VFR BLD CALC: 34.4 % (ref 40.5–52.5)
HEMOGLOBIN: 11.2 G/DL (ref 13.5–17.5)
INR BLD: 1.02 (ref 0.87–1.14)
LYMPHOCYTES ABSOLUTE: 3.1 K/UL (ref 1–5.1)
LYMPHOCYTES RELATIVE PERCENT: 40 %
MACROCYTES: ABNORMAL
MCH RBC QN AUTO: 28.6 PG (ref 26–34)
MCHC RBC AUTO-ENTMCNC: 32.6 G/DL (ref 31–36)
MCV RBC AUTO: 87.7 FL (ref 80–100)
METAMYELOCYTES RELATIVE PERCENT: 1 %
MONOCYTES ABSOLUTE: 1.2 K/UL (ref 0–1.3)
MONOCYTES RELATIVE PERCENT: 15 %
NEUTROPHILS ABSOLUTE: 3.5 K/UL (ref 1.7–7.7)
NEUTROPHILS RELATIVE PERCENT: 44 %
PDW BLD-RTO: 14.6 % (ref 12.4–15.4)
PERFORMED ON: ABNORMAL
PERFORMED ON: ABNORMAL
PLATELET # BLD: 153 K/UL (ref 135–450)
PMV BLD AUTO: 9.5 FL (ref 5–10.5)
POLYCHROMASIA: ABNORMAL
POTASSIUM REFLEX MAGNESIUM: 3.7 MMOL/L (ref 3.5–5.1)
POTASSIUM REFLEX MAGNESIUM: 3.8 MMOL/L (ref 3.5–5.1)
PROTHROMBIN TIME: 13.3 SEC (ref 11.7–14.5)
RBC # BLD: 3.92 M/UL (ref 4.2–5.9)
SODIUM BLD-SCNC: 136 MMOL/L (ref 136–145)
SODIUM BLD-SCNC: 137 MMOL/L (ref 136–145)
WBC # BLD: 7.7 K/UL (ref 4–11)

## 2022-11-23 PROCEDURE — 86901 BLOOD TYPING SEROLOGIC RH(D): CPT

## 2022-11-23 PROCEDURE — 93010 ELECTROCARDIOGRAM REPORT: CPT | Performed by: INTERNAL MEDICINE

## 2022-11-23 PROCEDURE — 86850 RBC ANTIBODY SCREEN: CPT

## 2022-11-23 PROCEDURE — 2580000003 HC RX 258

## 2022-11-23 PROCEDURE — 6370000000 HC RX 637 (ALT 250 FOR IP): Performed by: INTERNAL MEDICINE

## 2022-11-23 PROCEDURE — 80048 BASIC METABOLIC PNL TOTAL CA: CPT

## 2022-11-23 PROCEDURE — 85025 COMPLETE CBC W/AUTO DIFF WBC: CPT

## 2022-11-23 PROCEDURE — 36415 COLL VENOUS BLD VENIPUNCTURE: CPT

## 2022-11-23 PROCEDURE — 99233 SBSQ HOSP IP/OBS HIGH 50: CPT | Performed by: INTERNAL MEDICINE

## 2022-11-23 PROCEDURE — 6370000000 HC RX 637 (ALT 250 FOR IP): Performed by: STUDENT IN AN ORGANIZED HEALTH CARE EDUCATION/TRAINING PROGRAM

## 2022-11-23 PROCEDURE — 86900 BLOOD TYPING SEROLOGIC ABO: CPT

## 2022-11-23 PROCEDURE — 6370000000 HC RX 637 (ALT 250 FOR IP)

## 2022-11-23 PROCEDURE — 85610 PROTHROMBIN TIME: CPT

## 2022-11-23 RX ORDER — INSULIN LISPRO 100 [IU]/ML
15 INJECTION, SOLUTION INTRAVENOUS; SUBCUTANEOUS
Qty: 13.5 ML | Refills: 0 | Status: SHIPPED | OUTPATIENT
Start: 2022-11-23 | End: 2022-12-23

## 2022-11-23 RX ORDER — FUROSEMIDE 40 MG/1
40 TABLET ORAL DAILY
Qty: 30 TABLET | Refills: 2 | Status: SHIPPED | OUTPATIENT
Start: 2022-11-23 | End: 2023-02-21

## 2022-11-23 RX ORDER — SPIRONOLACTONE 50 MG/1
100 TABLET, FILM COATED ORAL DAILY
Qty: 30 TABLET | Refills: 3 | Status: SHIPPED | OUTPATIENT
Start: 2022-11-23

## 2022-11-23 RX ORDER — LOSARTAN POTASSIUM 50 MG/1
50 TABLET ORAL DAILY
Status: DISCONTINUED | OUTPATIENT
Start: 2022-11-23 | End: 2022-11-23 | Stop reason: HOSPADM

## 2022-11-23 RX ORDER — ENOXAPARIN SODIUM 100 MG/ML
40 INJECTION SUBCUTANEOUS 2 TIMES DAILY
Qty: 36 ML | Refills: 0 | Status: SHIPPED | OUTPATIENT
Start: 2022-11-23 | End: 2022-12-23

## 2022-11-23 RX ORDER — ENOXAPARIN SODIUM 100 MG/ML
40 INJECTION SUBCUTANEOUS 2 TIMES DAILY
Qty: 36 ML | Refills: 0 | Status: SHIPPED | OUTPATIENT
Start: 2022-11-23 | End: 2022-11-23 | Stop reason: SDUPTHER

## 2022-11-23 RX ORDER — PANTOPRAZOLE SODIUM 40 MG/1
40 TABLET, DELAYED RELEASE ORAL DAILY
Qty: 30 TABLET | Refills: 0 | Status: SHIPPED | OUTPATIENT
Start: 2022-11-23 | End: 2022-11-23 | Stop reason: HOSPADM

## 2022-11-23 RX ADMIN — LOSARTAN POTASSIUM 50 MG: 50 TABLET, FILM COATED ORAL at 10:01

## 2022-11-23 RX ADMIN — PANTOPRAZOLE SODIUM 40 MG: 40 TABLET, DELAYED RELEASE ORAL at 09:10

## 2022-11-23 RX ADMIN — SODIUM CHLORIDE, PRESERVATIVE FREE 10 ML: 5 INJECTION INTRAVENOUS at 09:11

## 2022-11-23 RX ADMIN — HALOPERIDOL 20 MG: 5 TABLET ORAL at 09:10

## 2022-11-23 RX ADMIN — FUROSEMIDE 20 MG: 20 TABLET ORAL at 09:11

## 2022-11-23 RX ADMIN — FENOFIBRATE 160 MG: 160 TABLET ORAL at 09:10

## 2022-11-23 RX ADMIN — DIVALPROEX SODIUM 500 MG: 125 CAPSULE, COATED PELLETS ORAL at 13:23

## 2022-11-23 RX ADMIN — INSULIN LISPRO 15 UNITS: 100 INJECTION, SOLUTION INTRAVENOUS; SUBCUTANEOUS at 09:15

## 2022-11-23 RX ADMIN — CARVEDILOL 50 MG: 25 TABLET, FILM COATED ORAL at 09:10

## 2022-11-23 RX ADMIN — ASPIRIN 81 MG: 81 TABLET, CHEWABLE ORAL at 09:10

## 2022-11-23 RX ADMIN — QUETIAPINE FUMARATE 200 MG: 200 TABLET ORAL at 09:10

## 2022-11-23 RX ADMIN — HALOPERIDOL 20 MG: 5 TABLET ORAL at 13:24

## 2022-11-23 RX ADMIN — DIVALPROEX SODIUM 500 MG: 125 CAPSULE, COATED PELLETS ORAL at 09:10

## 2022-11-23 RX ADMIN — INSULIN LISPRO 15 UNITS: 100 INJECTION, SOLUTION INTRAVENOUS; SUBCUTANEOUS at 13:24

## 2022-11-23 RX ADMIN — BENZTROPINE MESYLATE 1 MG: 1 TABLET ORAL at 09:10

## 2022-11-23 RX ADMIN — INSULIN GLARGINE 65 UNITS: 100 INJECTION, SOLUTION SUBCUTANEOUS at 09:53

## 2022-11-23 RX ADMIN — QUETIAPINE FUMARATE 200 MG: 200 TABLET ORAL at 13:24

## 2022-11-23 ASSESSMENT — ENCOUNTER SYMPTOMS
GASTROINTESTINAL NEGATIVE: 1
RESPIRATORY NEGATIVE: 1
EYES NEGATIVE: 1

## 2022-11-23 NOTE — PLAN OF CARE
Problem: Pain  Goal: Verbalizes/displays adequate comfort level or baseline comfort level  11/23/2022 1345 by Wilder Steele RN  Outcome: Progressing  11/23/2022 0028 by Sheri Killian RN  Outcome: Progressing  Note: No c/o pain this shift     Problem: Skin/Tissue Integrity  Goal: Absence of new skin breakdown  Description: 1. Monitor for areas of redness and/or skin breakdown  2. Assess vascular access sites hourly  3. Every 4-6 hours minimum:  Change oxygen saturation probe site  4. Every 4-6 hours:  If on nasal continuous positive airway pressure, respiratory therapy assess nares and determine need for appliance change or resting period.   11/23/2022 1345 by Wilder Steele RN  Outcome: Progressing  11/23/2022 0028 by Sheri Killian RN  Outcome: Progressing

## 2022-11-23 NOTE — PROGRESS NOTES
Podiatric Surgery Daily Progress Note  Cathi Randolph      Subjective :   Patient seen and examined this am at the bedside. Patient denies any acute overnight events. Patient denies N/V/F/C/SOB. Patient denies calf pain, thigh pain, chest pain. Review of Systems: A 12 point review of symptoms is unremarkable with the exception of the chief complaint. Patient specifically denies nausea, fever, vomiting, chills, shortness of breath, chest pain, abdominal pain, constipation or difficulty urinating. Objective     BP (!) 158/98   Pulse 67   Temp 97.5 °F (36.4 °C) (Oral)   Resp 18   Ht 6' 1\" (1.854 m)   Wt (!) 311 lb (141.1 kg)   SpO2 97%   BMI 41.03 kg/m²      I/O:  Intake/Output Summary (Last 24 hours) at 11/23/2022 1031  Last data filed at 11/22/2022 2338  Gross per 24 hour   Intake 360 ml   Output 400 ml   Net -40 ml              Wt Readings from Last 3 Encounters:   11/23/22 (!) 311 lb (141.1 kg)       LABS:    Recent Labs     11/22/22  0735 11/23/22  0710   WBC 7.8 7.7   HGB 11.3* 11.2*   HCT 34.4* 34.4*    153        Recent Labs     11/23/22  0710      K 3.8   CL 96*   CO2 30   BUN 18   CREATININE 1.2        Recent Labs     11/23/22  0710   INR 1.02           LIMITED LOWER EXTREMITY EXAMINATION    Dressing to left LE intact. No strikethrough noted to the external dressing. Left lower extremity examination limited due to extensive dressing with posterior splint. Capillary fill time noted to be < 3 seconds to the exposed digits. With ROM of the digits appreciated. Pain on palpation to the left lower extremity. IMAGING:  XR b/l ankle:  Narrative   History: Status post fall with bilateral ankle pain. COMPARISON: None. Right ankle:       FINDINGS: AP, lateral, and mortise views of the right ankle were obtained. Alignment is anatomic. Normal joint spaces. No fracture or dislocation. Impression   1. No acute findings in the right ankle.        Left ankle: FINDINGS: AP, lateral, and mortise views of the left ankle were obtained. There is a nondisplaced comminuted fracture of the distal shaft of the left fibula with incomplete healing. There is abundant surrounding periosteal reaction and callus formation    with ossification extending into the interosseous membrane. No other fractures. IMPRESSION:   1. Comminuted nondisplaced incompletely healed fracture at the distal shaft of the left fibula. 2. Ossification extending from the distal shaft of the left fibula into the tibiofibular interosseous membrane likely the sequela of trauma. 3. No dislocation. CT left ankle:  Narrative   History: Comminuted fibular fracture. Left ankle pain. CT of the left ankle without contrast.       TECHNIQUE: CT images were obtained of the left ankle without contrast.       FINDINGS:       Extensive heterotopic ossification surrounding the lower fibula along the interosseous membrane and surrounding the lower tibia consistent with sequela of remote trauma possibly representing a remote interosseous ligament injury. There is incomplete    osseous bridging of the ligamenta space. The fibula demonstrates a comminuted what appears represent acute fracture distally with distraction of the distal fibular tip. There is adjacent soft tissue swelling. No dislocation. No radiopaque foreign body. Impression   1. 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 Superimposed acute distal fibular fracture with comminution and    distraction. IMPRESSION/RECOMMENDATIONS:    -Comminuted fibular fracture( acute)- left ankle  -Syndesmotic ossification (chronic)-left ankle  -Left ankle pain  -Diabetes type II      -Patient examined and evaluated at the bedside   -VSS. No Leukocytosis noted. (6.4)   -Labs and imaging reviewed with impressions noted above.   -Compressive dressing left in place with cast padding and ace bandage. Walking boot in place  -Patient may weight bear to the left lower extremity with strict use of the walking boot for ambulation.   - Surgical intervention postponed due to concern for patient's decision making capacity with legal guardian in place. Patient's on file legal guardian was attempted to be contacted several times both by podiatry and case management without returned call. It was determine that consent could not be obtained at this time. Open reduction with internal fixation will be performed as an outpatient procedure. Expressed to the patient, case management and the medicine team the importance of the patient being able to follow up with outpatient care. DISPO: Comminuted fibular fracture left lower extremity with syndesmotic involvement. Labs and imaging reviewed. Patient may ambulate in a walking boot only to the left lower extremity.  Surgical intervention to be performed as an outpatient procedure due to issues surrounding consent.     -Assessment and plan discussed with Dr. Alvera Canavan, DPM   Podiatric Resident PGY1  Pager [phone] or Rex  11/21/2022, 4:45 PM

## 2022-11-23 NOTE — PROGRESS NOTES
Nutrition Note       NUTRITION RECOMMENDATIONS:   1. PO Diet: Continue current diet 4 carb   2. ONS: N/A    NUTRITION ASSESSMENT:  Nutritional summary & status: Follow up. Pt's diet has just advanced from NPO. Prior to NPO, pt's intakes recorded at %. Pt has no wounds and has no weight loss since admission. No nutrition intervention needed at this time. Admission/PMH: Admit d/t chest pain. PMHx of asthma, COPD, CHF, dementia, hypertension, cognitive impairment, schizoaffective disorder, type 2 diabetes    MALNUTRITION ASSESSMENT  Context of Malnutrition: Acute Illness   Malnutrition Status: No malnutrition    NUTRITION DIAGNOSIS   No nutrition diagnosis at this time     [facility] and/or Nutrient Delivery:  Continue Current Diet  Nutrition Education/Counseling:  Education not indicated       The patient will still be monitored per nutrition standards of care. Consult dietitian if nutrition interventions essential to patient care is needed.      Cole Adam, [address]:  [phone]  Office:  [phone]

## 2022-11-23 NOTE — CARE COORDINATION
Case Management Assessment            Discharge Note                    Date / Time of Note: 11/23/2022 4:46 PM                  Discharge Note Completed by: CAROLINE Gayle, LSW    Patient Name: Elizabeth López   YOB: 1980  Diagnosis: Chest pain [R07.9]  Hyperglycemia [R73.9]  Troponin level elevated [R77.8]   Date / Time: 11/15/2022 12:33 PM    Current PCP: Yovani Hooper MD  Clinic patient: No    Hospitalization in the last 30 days: No    Advance Directives:  Code Status: Prior  [place] DNR form completed and on chart: Yes    Financial:  Payor: Bonny Hansen / Plan: Vannesa Aguilar / Product Type: *No Product type* /      Pharmacy:    [place] [address] [address] [address] Siva Flores [phone] - F [phone]  [address] [address]  Phone: [phone] Fax: [phone]      Assistance purchasing medications?:    Assistance provided by Case Management: None at this time    Does patient want to participate in local refill/ meds to beds program?:      Meds To Beds General Rules:  1. Can ONLY be done Monday- Friday between 8:30am-5pm  2. Prescription(s) must be in pharmacy by 3pm to be filled same day  3. Copy of patient's insurance/ prescription drug card and patient face sheet must be sent along with the prescription(s)  4. Cost of Rx cannot be added to hospital bill. If financial assistance is needed, please contact unit  or ;  or  CANNOT provide pharmacy voucher for patients co-pays  5.  Patients can then  the prescription on their way out of the hospital at discharge, or pharmacy can deliver to the bedside if staff is available. (payment due at time of pick-up or delivery - cash, check, or card accepted)     Able to afford home medications/ co-pay costs: Yes    ADLS:  Current PT AM-PAC Score: 12 /24  Current OT AM-PAC Score: 16 /24      DISCHARGE Disposition: [facility] (LTC): [facility]  Phone: [phone]  Fax: [phone]    LOC at discharge: [address] Completed: Yes    Notification completed in HENS/PAS?:  Not Applicable    IMM Completed:   No    Transportation:  Transportation PLAN for discharge: EMS transportation   Mode of Transport: Ambulance stretcher - BLS  Reason for medical transport: Bed confined: Meets the following criteria 1) unable to get out of bed without assistance or ambulate, 2) unable to safely sit up in a wheelchair, 3) unable to maintain erect seating position in a chair for time needed for transport  Name of [address] [place] [address], [address]: [facility]  Phone: [phone]  Time of Transport: 1:00pm    Transport form completed: Yes    Home Oxygen and Respiratory Equipment:  Oxygen needed at discharge?: No    Dialysis:  Dialysis patient: No    Referrals made at [facility] for outpatient continued care:  Not Applicable    Additional CM Notes:  Pt will DC to [address] [facility] today. CM notified Nicki Stevens in admissions and pt's RN of transport time (CM still could not reach guardian). No other needs at this time. CM later received message from RN reporting that guardian is now Diaz Evelyn, at same listed phone number. Diaz Rivas requested AVS; CM faxed [phone]. The Plan for Transition of Care is related to the following treatment goals of Chest pain [R07.9]  Hyperglycemia [R73.9]  Troponin level elevated [R77.8]    The Patient and/or patient representative Toby Eddy and his family were provided with a choice of provider and agrees with the discharge plan Yes    Freedom of choice list was provided with basic dialogue that supports the patient's individualized plan of care/goals and shares the quality data associated with the providers.  Yes    Care Transitions patient: No    CAROLINE Baxter, [facility]  Case Management Department  Ph: [phone]  Fax: [phone]

## 2022-11-23 NOTE — CARE COORDINATION
CM following. CM spoke with Podiatry Resident regarding pt's needed surgery (this afternoon) and efforts to speak with pt's guardian. She reported also leaving 's for Jacinto Garrett with no response, and then being informed by the guardianship agency that pt does not have a current guardian. CM spoke with pt's RN about the situation and requested for RN to notify CM if pt has a visitor that she is aware of today. CM left  for [facility] liaison requesting any info on file about pt's guardianship.           Electronically signed by CAROLINE Vallejo LSW on 11/23/2022 at 9:18 AM

## 2022-11-23 NOTE — PROGRESS NOTES
Nephrology Note                                                                                                                                                                                                                                                                                                                                                               Office : [phone]     Fax :[phone]              Patient's Name: Brady Elizondo      Reason for Consult:  r/o Cushing syndrome   Requesting Physician:  Brian De Paz MD      No new change   Good UO   Lytes in good range        reports that he has been smoking cigarettes. He has never used smokeless tobacco. He reports that he does not currently use drugs after having used the following drugs: Marijuana Brown [place]ail). He reports that he does not drink alcohol. Allergies:  Patient has no known allergies.     Current Medications:    ceFAZolin (ANCEF) 3,000 mg in dextrose 5 % 100 mL IVPB, On Call  oxyCODONE (ROXICODONE) immediate release tablet 5 mg, Q4H PRN  insulin lispro (1 Unit Dial) (HUMALOG/ADMELOG) pen 15 Units, TID WC  insulin glargine (LANTUS;BASAGLAR) injection pen 65 Units, BID  [Held by provider] enoxaparin Sodium (LOVENOX) injection 30 mg, BID  atorvastatin (LIPITOR) tablet 80 mg, Nightly  benztropine (COGENTIN) tablet 1 mg, BID  fenofibrate (TRIGLIDE) tablet 160 mg, Daily  haloperidol (HALDOL) tablet 20 mg, TID  QUEtiapine (SEROQUEL) tablet 200 mg, 4x Daily  perflutren lipid microspheres (DEFINITY) injection 1.5 mL, ONCE PRN  sodium chloride flush 0.9 % injection 5-40 mL, 2 times per day  sodium chloride flush 0.9 % injection 5-40 mL, PRN  0.9 % sodium chloride infusion, PRN  ondansetron (ZOFRAN-ODT) disintegrating tablet 4 mg, Q8H PRN   Or  ondansetron (ZOFRAN) injection 4 mg, Q6H PRN  polyethylene glycol (GLYCOLAX) packet 17 g, Daily PRN  acetaminophen (TYLENOL) tablet 650 mg, Q6H PRN   Or  acetaminophen (TYLENOL) suppository 650 mg, Q6H PRN  labetalol (NORMODYNE;TRANDATE) injection 10 mg, Q4H PRN  aspirin chewable tablet 81 mg, Daily  insulin lispro (1 Unit Dial) (HUMALOG/ADMELOG) pen 0-16 Units, TID WC  insulin lispro (1 Unit Dial) (HUMALOG/ADMELOG) pen 0-4 Units, Nightly  divalproex (DEPAKOTE SPRINKLE) capsule 500 mg, TID  furosemide (LASIX) tablet 20 mg, Daily  pantoprazole (PROTONIX) tablet 40 mg, Daily  glucose chewable tablet 16 g, PRN  dextrose bolus 10% 125 mL, PRN   Or  dextrose bolus 10% 250 mL, PRN  glucagon (rDNA) injection 1 mg, PRN  dextrose 10 % infusion, Continuous PRN  carvedilol (COREG) tablet 50 mg, BID      Physical exam:     Vitals:  BP (!) 158/98   Pulse 67   Temp 97.5 °F (36.4 °C) (Oral)   Resp 18   Ht 6' 1\" (1.854 m)   Wt (!) 311 lb (141.1 kg)   SpO2 97%   BMI 41.03 kg/m²   Constitutional:  OAA X3 NAD  Skin: striae   Heent:  eomi, mmm  Neck: no bruits or jvd noted  Cardiovascular:  S1, S2 without m/r/g  Respiratory: CTA B without w/r/r  Abdomen:  +bs, soft, nt, nd  Ext: ++ lower extremity edema  Psychiatric: mood and affect appropriate  Musculoskeletal:  Rom, muscular strength intact    Data:   Labs:  CBC:   Recent Labs     11/21/22  0916 11/22/22  0735 11/23/22  0710   WBC 6.4 7.8 7.7   HGB 10.9* 11.3* 11.2*    144 153       BMP:    Recent Labs     11/21/22  0917 11/22/22  0735 11/23/22  0710   * 140 137   K 3.6 4.0 3.8   CL 94* 99 96*   CO2 30 32 30   BUN 15 15 18   CREATININE 1.2 1.1 1.2   GLUCOSE 312* 186* 155*       Ca/Mg/Phos:   Recent Labs     11/21/22  0917 11/22/22  0735 11/23/22  0710   CALCIUM 9.2 9.3 9.3       Hepatic: No results for input(s): AST, ALT, ALB, BILITOT, ALKPHOS in the last 72 hours. Troponin: No results for input(s): TROPONINI in the last 72 hours. BNP: No results for input(s): BNP in the last 72 hours. Lipids: No results for input(s): CHOL, TRIG, HDL, LDLCALC, LABVLDL in the last 72 hours. ABGs: No results for input(s): PHART, PO2ART, [id number] in the last 72 hours.   INR: Recent Labs     11/23/22  0710   INR 1.02     UA:No results for input(s): Faustino Nordmann, GLUCOSEU, BILIRUBINUR, Cj Kelli, BLOODU, PHUR, PROTEINU, UROBILINOGEN, NITRU, LEUKOCYTESUR, Mat Hobby in the last 72 hours. Urine Microscopic: No results for input(s): LABCAST, BACTERIA, COMU, HYALCAST, WBCUA, RBCUA, EPIU in the last 72 hours. Urine Culture: No results for input(s): LABURIN in the last 72 hours. Urine Chemistry: No results for input(s): Óscar Broadview, PROTEINUR, NAUR in the last 72 hours. IMAGING:  XR CHEST (2 VW)   Final Result      No acute pulmonary pathology                  CT ANKLE LEFT WO CONTRAST   Final Result   1. 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 Superimposed acute distal fibular fracture with comminution and    distraction. XR ANKLE LEFT (MIN 3 VIEWS)   Final Result   1. No acute findings in the right ankle. Left ankle:      FINDINGS: AP, lateral, and mortise views of the left ankle were obtained. There is a nondisplaced comminuted fracture of the distal shaft of the left fibula with incomplete healing. There is abundant surrounding periosteal reaction and callus formation    with ossification extending into the interosseous membrane. No other fractures. IMPRESSION:   1. Comminuted nondisplaced incompletely healed fracture at the distal shaft of the left fibula. 2. Ossification extending from the distal shaft of the left fibula into the tibiofibular interosseous membrane likely the sequela of trauma. 3. No dislocation. XR ANKLE RIGHT (MIN 3 VIEWS)   Final Result   1. No acute findings in the right ankle. Left ankle:      FINDINGS: AP, lateral, and mortise views of the left ankle were obtained.  There is a nondisplaced comminuted fracture of the distal shaft of the left fibula with incomplete healing. There is abundant surrounding periosteal reaction and callus formation    with ossification extending into the interosseous membrane. No other fractures. IMPRESSION:   1. Comminuted nondisplaced incompletely healed fracture at the distal shaft of the left fibula. 2. Ossification extending from the distal shaft of the left fibula into the tibiofibular interosseous membrane likely the sequela of trauma. 3. No dislocation. CT HEAD WO CONTRAST   Final Result   1. No evidence for acute intracranial process. No bleed or shift   2. Paranasal sinus disease, there are areas of increased density noted within this disease process for which I cannot exclude a fungal etiology   3. Frontal temporal atrophy      CT ADRENALS W WO CONTRAST   Final Result      1. Diffuse symmetric nodular thickening of the adrenal glands bilaterally with the right adrenal gland measuring 7.2 cm in length and the left adrenal gland measuring 6.6 cm in length. The CT appearance is most consistent with diffuse nodular adrenal    hyperplasia rather than an adrenal mass. CT follow-up could be considered. 2. Diffuse fatty infiltration of liver. XR CHEST (2 VW)   Final Result      Bibasilar atelectatic changes          Assessment/Plan   Obesity     2. HTN    3. Anemia    4. Acid- base/ Electrolyte imbalance     5.  Hypokalemia     Plan     Pt has symptoms of hypercortisolism   Cortisol 28.4  CT Adrenal  - Hyperplasia   ACTH pending      Will need   24 hours UFC   Salivary cortisol level   Dexa supp test       Bp meds adjusted     Need out pt follow up   [facility] to [facility] today               Thank you for allowing us to participate in care of Nelly Hurtado MD  Feel free to contact me   [facility] [address]  Office : [phone]  Fax :[phone]

## 2022-11-23 NOTE — DISCHARGE SUMMARY
INTERNAL MEDICINE DEPARTMENT AT [address] [place] [address]  DISCHARGE SUMMARY    Patient ID: Jolynn Mohr                                             Discharge Date: 11/23/2022   Patient's PCP: Adriana Reyes MD                                          Discharge Physician: Rashard Lechuga MD   Admit Date: 11/15/2022   Admitting Physician: Jeane Llanos MD    PROBLEMS DURING HOSPITALIZATION:  Present on Admission:   Chest pain   Troponin level elevated   Hyperglycemia   Obesity   Electrolyte imbalance   Cushingoid facies      DISCHARGE DIAGNOSES:    HPI: Adriel Lares is 38 y.o M with pmhx of asthma, COPD on 2L baseline CHF, dementia, hypertension, cognitive impairment, schizoaffective disorder, type 2 diabetes with a fall. Patient has history of falls and was recently admitted for similar presentation. Patient is from [facility] and reportedly had a fall 2 days ago when he fell off from his bed. He started complaining of back pain today. Patient was not able to verbalize his needs well but he seemed to complain of chest and epigastric pain. In the ED, patient was mildly hyponatremic at 133, creatinine 1.4 baseline seems to be at 1.0, hyperglycemia at 443, troponin 0.1x2, mildly elevated alkaline phosphate 135 and ALT 64. Chest x-ray showed bibasilar atelectasis no pleural effusions or consolidations. EKG showed sinus tachycardia with J-point ST elevations. Patient was given aspirin 325 mg chewable and admitted for further evaluation. The following issues were addressed during hospitalization:    Chest Pain  Pt was seen for evaluation of CP, had elevated troponin x 3 but down trended prior to discharge [0.1 > >0.1 > 0.09]. Pt was treated with heparin drip which was dc'd 11/17 and was continued on home meds including but not limited to aspirin, statin, coreg, lasix. VAHE  Was found to have an elevated Cr of 1.4 but returned to wnl prior to discharge.  Nephrology was consulted with instructions to follow up in 2 weeks at discharge. T2DM  Pt was found to have uncontrolled hyperglycemia during the hospital course. The sugars were controlled with insulin. This raised concerns for possible Cushings as pt was noted have striae on L abdomen, and pt also possessed fat under his chin  and is obese as well. Cortisol was nonsuppressible on dexamethasone suppression test. CT of adrenal glands showed BL diffuse nodular hyperplasia. Of note, pt is on seroquel. Pt may benefit from outpatient workup for cushing, which in turn may help control pt's hyperglycemia. L fibula Fx  Pt was found to have nondisplaced incompletely healed L distal fibula Fx and podiatry was consulted who splinted the pt with non weight bearing recommendations on LLE with help of crutches, but pt was unable to comply as per PT who were also involved in addition to OT. Pt was then given a knee high boot. Podiatry recommended ORIF to repair the fx, but were unable to obtain consent as they couldn't reach the legal guardian and surgery has now been postponed as potential outpatient procedure. Pt will be discharged on BID lovenox for 30 days due to risk of DVT, PE, MI, stroke and other clots as pt will relatively be immobile due to the fx and the boot in addition to other contributing factors such as obesity, diabetes, and HTN. The following documentation can be found by podiatry on 11/23:  \"Surgical intervention postponed due to concern for patient's decision making capacity with legal guardian in place. Patient's on file legal guardian was attempted to be contacted several times both by podiatry and case management without returned call. It was determine that consent could not be obtained at this time. Open reduction with internal fixation will be performed as an outpatient procedure. Expressed to the patient, case management and the medicine team the importance of the patient being able to follow up with outpatient care.       DISPO: Comminuted fibular fracture left lower extremity with syndesmotic involvement. Labs and imaging reviewed. Patient may ambulate in a walking boot only to the left lower extremity. Surgical intervention to be performed as an outpatient procedure due to issues surrounding consent. \"      Physical Exam:  Physical Exam   Constitutional:       General: He is awake. Appearance: Normal appearance. He is morbidly obese. HENT:      Head: Normocephalic and atraumatic. Eyes:      Extraocular Movements: Extraocular movements intact. Conjunctiva/sclera: Conjunctivae normal.      Pupils: Pupils are equal, round, and reactive to light. Cardiovascular:      Rate and Rhythm: Normal rate and regular rhythm. Heart sounds: Normal heart sounds. Pulmonary:      Effort: Pulmonary effort is normal.      Breath sounds: Normal breath sounds. Abdominal:      General: Abdomen is flat. Bowel sounds are normal.      Palpations: Abdomen is soft. Comments: Striae noted L abdomen   Musculoskeletal:         General: Normal range of motion. Skin:     General: Skin is warm and dry. Neurological:      General: No focal deficit present. Mental Status: He is alert and oriented to person, place, and time. Mental status is at baseline. Psychiatric:         Mood and Affect: Mood normal.         Behavior: Behavior normal. Behavior is cooperative. Consults: endocrinology, podiatry  Significant Diagnostic Studies:  CT adrenals, BL ankle XR, CXR, cbc, bmp, echo  Treatments: insulin, heparin drip, haldol. Home meds  Disposition: long term care facility  Discharged Condition: Stable  Follow Up: Primary Care Physician in 1 week and Nephrology in 2 weeks and podiatry in 1 day as well as endocrinology.     DISCHARGE MEDICATION:       Medication List        START taking these medications      enoxaparin Sodium 30 MG/0.3ML injection  Commonly known as: LOVENOX  Inject 0.4 mLs into the skin 2 times daily     insulin glargine 100 UNIT/ML injection pen  Commonly known as: LANTUS;BASAGLAR  Inject 65 Units into the skin 2 times daily     insulin lispro (1 Unit Dial) 100 UNIT/ML Sopn  Commonly known as: HUMALOG/ADMELOG  Inject 15 Units into the skin 3 times daily (with meals)     pantoprazole 40 MG tablet  Commonly known as: PROTONIX  Take 1 tablet by mouth daily            CHANGE how you take these medications      atorvastatin 80 MG tablet  Commonly known as: LIPITOR  Take 1 tablet by mouth nightly  What changed:   medication strength  how much to take  when to take this            CONTINUE taking these medications      acetaminophen 325 MG tablet  Commonly known as: TYLENOL     aspirin 81 MG chewable tablet     benztropine 1 MG tablet  Commonly known as: COGENTIN     carvedilol 25 MG tablet  Commonly known as: COREG     chlorthalidone 25 MG tablet  Commonly known as: HYGROTON     divalproex 125 MG capsule  Commonly known as: DEPAKOTE SPRINKLE     fenofibrate micronized 200 MG capsule  Commonly known as: LOFIBRA     ferrous gluconate 324 (38 Fe) MG tablet  Commonly known as: FERGON     furosemide 20 MG tablet  Commonly known as: LASIX     haloperidol 20 MG tablet  Commonly known as: HALDOL     hydrALAZINE 25 MG tablet  Commonly known as: APRESOLINE     metFORMIN 500 MG tablet  Commonly known as: GLUCOPHAGE     NovoLOG FlexPen 100 UNIT/ML injection pen  Generic drug: insulin aspart     omeprazole 20 MG delayed release capsule  Commonly known as: PRILOSEC     QUEtiapine 200 MG tablet  Commonly known as: SEROQUEL     senna 8.6 MG tablet  Commonly known as: SENOKOT     spironolactone 50 MG tablet  Commonly known as: ALDACTONE     Trulicity 4.10 CX/3.7JJ Sopn  Generic drug: Dulaglutide     vitamin B-12 1000 MCG tablet  Commonly known as: CYANOCOBALAMIN            STOP taking these medications      cloNIDine 0.3 MG/24HR Ptwk  Commonly known as: CATAPRES     isosorbide mononitrate 60 MG extended release tablet  Commonly known as: IMDUR     NIFEdipine 90 MG extended release tablet  Commonly known as: ADALAT CC     Tresiba FlexTouch 100 UNIT/ML Sopn  Generic drug: Insulin Degludec     valsartan 160 MG tablet  Commonly known as: DIOVAN               Where to Get Your Medications        These medications were sent to [facility], [address] Whit Alonso [phone] - F [phone]  [address] [place] [address]      Phone: [phone]   enoxaparin Sodium 30 MG/0.3ML injection  pantoprazole 40 MG tablet       You can get these medications from any pharmacy    Bring a paper prescription for each of these medications  atorvastatin 80 MG tablet  insulin glargine 100 UNIT/ML injection pen  insulin lispro (1 Unit Dial) 100 UNIT/ML Sopn          Activity: activity as tolerated  Diet: diabetic diet  Wound Care: none needed    Time Spent on discharge is more than 30 minutes    Signed:  Rashard Lechuga MD, PGY-1  11/23/2022

## 2022-11-23 NOTE — PROGRESS NOTES
Progress Note    Admit Date: 11/15/2022  Day: 6  Diet: Diet NPO    CC: Fall, chest pain    Interval history:   Patient was doing okay this AM, reported no new symptoms, had no concerns or complaints. Said that L ankle pain is better. Pt's nondisplaced healing  L distal fibula fx is being managed by knee high walking boot by podiatry, and they are okay with close outpatient follow up. HPI:  Herlinda Garg is 38 y.o M with pmhx of asthma, COPD on 2L baseline CHF, dementia, hypertension, cognitive impairment, schizoaffective disorder, type 2 diabetes with a fall. Patient has history of falls and was recently admitted for similar presentation. Patient is from [facility] and reportedly had a fall 2 days ago when he fell off from his bed. He started complaining of back pain today. Patient was not able to verbalize his needs well but he seemed to complain of chest and epigastric pain. In the ED, patient was mildly hyponatremic at 133, creatinine 1.4 baseline seems to be at 1.0, hyperglycemia at 443, troponin 0.1x2, mildly elevated alkaline phosphate 135 and ALT 64. Chest x-ray showed bibasilar atelectasis no pleural effusions or consolidations. EKG showed sinus tachycardia with J-point ST elevations. Patient was given aspirin 325 mg chewable and admitted for further evaluation.     Medications:     Scheduled Meds:   ceFAZolin  3,000 mg IntraVENous On Call    insulin lispro  15 Units SubCUTAneous TID WC    insulin glargine  65 Units SubCUTAneous BID    [Held by provider] enoxaparin  30 mg SubCUTAneous BID    atorvastatin  80 mg Oral Nightly    benztropine  1 mg Oral BID    fenofibrate  160 mg Oral Daily    haloperidol  20 mg Oral TID    QUEtiapine  200 mg Oral 4x Daily    sodium chloride flush  5-40 mL IntraVENous 2 times per day    aspirin  81 mg Oral Daily    insulin lispro  0-16 Units SubCUTAneous TID WC    insulin lispro  0-4 Units SubCUTAneous Nightly    divalproex  500 mg Oral TID    furosemide  20 mg Oral Daily    pantoprazole  40 mg Oral Daily    carvedilol  50 mg Oral BID     Continuous Infusions:   sodium chloride      dextrose       PRN Meds:oxyCODONE, perflutren lipid microspheres, sodium chloride flush, sodium chloride, ondansetron **OR** ondansetron, polyethylene glycol, acetaminophen **OR** acetaminophen, labetalol, glucose, dextrose bolus **OR** dextrose bolus, glucagon (rDNA), dextrose    Objective:   Vitals:   T-max:  Patient Vitals for the past 8 hrs:   BP Temp Temp src Pulse Resp SpO2 Weight   11/23/22 0908 (!) 158/98 97.5 °F (36.4 °C) Oral 67 18 97 % --   11/23/22 0525 -- -- -- -- -- -- (!) 311 lb (141.1 kg)   11/23/22 0354 (!) 150/105 97 °F (36.1 °C) Oral 79 18 95 % --         Intake/Output Summary (Last 24 hours) at 11/23/2022 2753  Last data filed at 11/22/2022 2338  Gross per 24 hour   Intake 360 ml   Output 400 ml   Net -40 ml         Review of Systems   Constitutional: Negative. HENT: Negative. Eyes: Negative. Respiratory: Negative. Cardiovascular: Negative. Gastrointestinal: Negative. Genitourinary: Negative. Musculoskeletal: Negative. Skin: Negative. Neurological: Negative. Psychiatric/Behavioral: Negative. Physical Exam  Vitals reviewed. Constitutional:       General: He is awake. Appearance: Normal appearance. He is morbidly obese. HENT:      Head: Normocephalic and atraumatic. Eyes:      Extraocular Movements: Extraocular movements intact. Conjunctiva/sclera: Conjunctivae normal.      Pupils: Pupils are equal, round, and reactive to light. Cardiovascular:      Rate and Rhythm: Normal rate and regular rhythm. Heart sounds: Normal heart sounds. Pulmonary:      Effort: Pulmonary effort is normal.      Breath sounds: Normal breath sounds. Abdominal:      General: Abdomen is flat. Bowel sounds are normal.      Palpations: Abdomen is soft.       Comments: Striae noted L abdomen   Musculoskeletal:         General: Normal range of motion. Skin:     General: Skin is warm and dry. Neurological:      General: No focal deficit present. Mental Status: He is alert and oriented to person, place, and time. Mental status is at baseline. Psychiatric:         Mood and Affect: Mood normal.         Behavior: Behavior normal. Behavior is cooperative. LABS:    CBC:   Recent Labs     11/21/22  0916 11/22/22  0735 11/23/22  0710   WBC 6.4 7.8 7.7   HGB 10.9* 11.3* 11.2*   HCT 33.1* 34.4* 34.4*    144 153   MCV 87.1 87.4 87.7       Renal:    Recent Labs     11/21/22  0917 11/22/22  0735 11/23/22  0710   * 140 137   K 3.6 4.0 3.8   CL 94* 99 96*   CO2 30 32 30   BUN 15 15 18   CREATININE 1.2 1.1 1.2   GLUCOSE 312* 186* 155*   CALCIUM 9.2 9.3 9.3   ANIONGAP 9 9 11       Hepatic: No results for input(s): AST, ALT, BILITOT, BILIDIR, PROT, LABALBU, ALKPHOS in the last 72 hours. Troponin:   No results for input(s): TROPONINI in the last 72 hours. BNP: No results for input(s): BNP in the last 72 hours. Lipids: No results for input(s): CHOL, HDL in the last 72 hours. Invalid input(s): LDLCALCU, TRIGLYCERIDE  ABGs:  No results for input(s): PHART, XBX3WDW, PO2ART, XUI1VEX, BEART, THGBART, Y5OKRSNR, XLL6TXE in the last 72 hours. INR:   Recent Labs     11/23/22  0710   INR 1.02     Lactate: No results for input(s): LACTATE in the last 72 hours. Cultures:  -----------------------------------------------------------------  RAD:   XR CHEST (2 VW)   Final Result      No acute pulmonary pathology                  CT ANKLE LEFT WO CONTRAST   Final Result   1. 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 Superimposed acute distal fibular fracture with comminution and    distraction. XR ANKLE LEFT (MIN 3 VIEWS)   Final Result   1. No acute findings in the right ankle.       Left ankle: FINDINGS: AP, lateral, and mortise views of the left ankle were obtained. There is a nondisplaced comminuted fracture of the distal shaft of the left fibula with incomplete healing. There is abundant surrounding periosteal reaction and callus formation    with ossification extending into the interosseous membrane. No other fractures. IMPRESSION:   1. Comminuted nondisplaced incompletely healed fracture at the distal shaft of the left fibula. 2. Ossification extending from the distal shaft of the left fibula into the tibiofibular interosseous membrane likely the sequela of trauma. 3. No dislocation. XR ANKLE RIGHT (MIN 3 VIEWS)   Final Result   1. No acute findings in the right ankle. Left ankle:      FINDINGS: AP, lateral, and mortise views of the left ankle were obtained. There is a nondisplaced comminuted fracture of the distal shaft of the left fibula with incomplete healing. There is abundant surrounding periosteal reaction and callus formation    with ossification extending into the interosseous membrane. No other fractures. IMPRESSION:   1. Comminuted nondisplaced incompletely healed fracture at the distal shaft of the left fibula. 2. Ossification extending from the distal shaft of the left fibula into the tibiofibular interosseous membrane likely the sequela of trauma. 3. No dislocation. CT HEAD WO CONTRAST   Final Result   1. No evidence for acute intracranial process. No bleed or shift   2. Paranasal sinus disease, there are areas of increased density noted within this disease process for which I cannot exclude a fungal etiology   3. Frontal temporal atrophy      CT ADRENALS W WO CONTRAST   Final Result      1. Diffuse symmetric nodular thickening of the adrenal glands bilaterally with the right adrenal gland measuring 7.2 cm in length and the left adrenal gland measuring 6.6 cm in length.  The CT appearance is most consistent with diffuse nodular adrenal    hyperplasia rather than an adrenal mass. CT follow-up could be considered. 2. Diffuse fatty infiltration of liver. XR CHEST (2 VW)   Final Result      Bibasilar atelectatic changes          Assessment/Plan:   Bell Ac is 38 y.o M with pmhx of asthma, COPD, CHF, dementia, hypertension, cognitive impairment, schizoaffective disorder, type 2 diabetes with a fall admitted for further evaluation. Chest/epigastric pain  Concern for ACS  Patient seen for complaints of fall of epigastric or chest pain   EKG per ED notes(which was not available in patient room) significant for  J-point ST elevation through precordial leads  -Troponins elevated at 0.10 X 2, follow-up troponinX3 was 0.09  -ok to dc from medicine standpoint [11/21]  -Continue aspirin 81 mg daily  -Continue Lipitor 80 mg nightly  -Heparin drip d/c'd 11/17  -LHC showed normal coronary arteries       VAHE-resolved  Patient's creatinine on presentation was 1.4. His baseline appears to be around 1.1  -Monitor RFP  -Nephro consult     T2DM  Patient's blood sugars have been in the 300-500 status today. He likes to eat a lot of snacks and is always asking for more food. -A1c was 10% in July 2022, follow-up A1c on 11/15 12.6  -Lantus 65 U BID  - Lispro 15 units 3x daily with meals  -High-dose sliding scale  -PCOT every 4 hours  -Hypoglycemia protocol  -Dietitian consulted        Chronic medical conditions  COPD (not in exacerbation)  CHF [not in acute exacerbation]  -Patient does not appear volume overloaded but massive distended abdomen. -2D echo showed ejection fraction estimated to be 50%, intermediate diastolic function, cannot exclude regional wall motion abnormality secondary to poor endocardial visualization.  -Monitor daily standing weight  -20mg PO lasix daily  -Monitor daily electrolytes and BMP replace as needed  Essential hypertension  -Carvedilol 50 mg twice daily  -Labetalol 10 mg as needed  Schizoaffective disorder  -Holding home olanzapine. Continue benztropine, depakote, haldol, seroquel    Code Status: Full code  FEN: Diabetic diet  PPX: Lovenox  DISPO: F Smiley Homans, MD PGY-1  11/23/22  9:27 AM    This patient will be staffed and discussed with Sean Perera MD.

## 2022-11-23 NOTE — PLAN OF CARE
Problem: Pain  Goal: Verbalizes/displays adequate comfort level or baseline comfort level  11/23/2022 0028 by Mikhail Ardon RN  Outcome: Progressing  Note: No c/o pain this shift     Problem: Skin/Tissue Integrity  Goal: Absence of new skin breakdown  Description: 1. Monitor for areas of redness and/or skin breakdown  2. Assess vascular access sites hourly  3. Every 4-6 hours minimum:  Change oxygen saturation probe site  4. Every 4-6 hours:  If on nasal continuous positive airway pressure, respiratory therapy assess nares and determine need for appliance change or resting period.   Outcome: Progressing

## 2022-11-27 LAB
CORTISOL (UR), FREE: NORMAL UG/D
CORTISOL URINE, FREE (/G CRT): 20.3 UG/G CRT
CORTISOL,F,UG/L,U: 40 UG/L
CREATININE 24 HOUR URINE: NORMAL MG/D (ref 1000–2500)
CREATININE URINE: 197 MG/DL
HOURS COLLECTED: 0
INTERPRETATION: NORMAL
URINE TOTAL VOLUME: 0

## 2022-12-12 ENCOUNTER — HOSPITAL ENCOUNTER (EMERGENCY)
Age: 42
Discharge: HOME | End: 2022-12-13
Attending: EMERGENCY MEDICINE
Payer: COMMERCIAL

## 2022-12-12 DIAGNOSIS — R73.9 HYPERGLYCEMIA: Primary | ICD-10-CM

## 2022-12-12 LAB
BASE EXCESS VENOUS: 2.6 MMOL/L (ref -2–3)
CARBOXYHEMOGLOBIN: 1.9 % (ref 0–1.5)
GLUCOSE BLD-MCNC: 396 MG/DL (ref 70–99)
HCO3 VENOUS: 27.3 MMOL/L (ref 24–28)
HEMOGLOBIN, VEN, REDUCED: 14.6 %
METHEMOGLOBIN VENOUS: 0.5 % (ref 0–1.5)
O2 SAT, VEN: 85 %
PCO2, VEN: 41.6 MMHG (ref 41–51)
PERFORMED ON: ABNORMAL
PH VENOUS: 7.43 (ref 7.35–7.45)
PO2, VEN: 50.1 MMHG (ref 25–40)
TCO2 CALC VENOUS: 29 MMOL/L

## 2022-12-12 PROCEDURE — 80053 COMPREHEN METABOLIC PANEL: CPT

## 2022-12-12 PROCEDURE — 83605 ASSAY OF LACTIC ACID: CPT

## 2022-12-12 PROCEDURE — 96361 HYDRATE IV INFUSION ADD-ON: CPT

## 2022-12-12 PROCEDURE — 82803 BLOOD GASES ANY COMBINATION: CPT

## 2022-12-12 PROCEDURE — 93005 ELECTROCARDIOGRAM TRACING: CPT

## 2022-12-12 PROCEDURE — 96360 HYDRATION IV INFUSION INIT: CPT

## 2022-12-12 PROCEDURE — 99284 EMERGENCY DEPT VISIT MOD MDM: CPT

## 2022-12-12 PROCEDURE — 85025 COMPLETE CBC W/AUTO DIFF WBC: CPT

## 2022-12-12 PROCEDURE — 36415 COLL VENOUS BLD VENIPUNCTURE: CPT

## 2022-12-12 PROCEDURE — 96372 THER/PROPH/DIAG INJ SC/IM: CPT

## 2022-12-12 RX ORDER — SODIUM CHLORIDE, SODIUM LACTATE, POTASSIUM CHLORIDE, AND CALCIUM CHLORIDE .6; .31; .03; .02 G/100ML; G/100ML; G/100ML; G/100ML
2000 INJECTION, SOLUTION INTRAVENOUS ONCE
Status: DISCONTINUED | OUTPATIENT
Start: 2022-12-13 | End: 2022-12-13 | Stop reason: HOSPADM

## 2022-12-12 RX ORDER — SODIUM CHLORIDE, SODIUM LACTATE, POTASSIUM CHLORIDE, CALCIUM CHLORIDE 600; 310; 30; 20 MG/100ML; MG/100ML; MG/100ML; MG/100ML
1000 INJECTION, SOLUTION INTRAVENOUS CONTINUOUS
Status: DISCONTINUED | OUTPATIENT
Start: 2022-12-12 | End: 2022-12-12

## 2022-12-12 RX ORDER — SODIUM CHLORIDE, SODIUM LACTATE, POTASSIUM CHLORIDE, AND CALCIUM CHLORIDE .6; .31; .03; .02 G/100ML; G/100ML; G/100ML; G/100ML
1000 INJECTION, SOLUTION INTRAVENOUS ONCE
Status: DISCONTINUED | OUTPATIENT
Start: 2022-12-13 | End: 2022-12-12

## 2022-12-13 VITALS
SYSTOLIC BLOOD PRESSURE: 174 MMHG | HEART RATE: 92 BPM | WEIGHT: 315 LBS | OXYGEN SATURATION: 97 % | DIASTOLIC BLOOD PRESSURE: 111 MMHG | TEMPERATURE: 97 F | BODY MASS INDEX: 42.51 KG/M2 | RESPIRATION RATE: 16 BRPM

## 2022-12-13 LAB
A/G RATIO: 1.6 (ref 1.1–2.2)
ALBUMIN SERPL-MCNC: 3.9 G/DL (ref 3.4–5)
ALP BLD-CCNC: 96 U/L (ref 40–129)
ALT SERPL-CCNC: 56 U/L (ref 10–40)
ANION GAP SERPL CALCULATED.3IONS-SCNC: 11 MMOL/L (ref 3–16)
AST SERPL-CCNC: 17 U/L (ref 15–37)
BACTERIA: ABNORMAL /HPF
BANDED NEUTROPHILS RELATIVE PERCENT: 2 % (ref 0–7)
BASOPHILS ABSOLUTE: 0 K/UL (ref 0–0.2)
BASOPHILS RELATIVE PERCENT: 0 %
BILIRUB SERPL-MCNC: <0.2 MG/DL (ref 0–1)
BILIRUBIN URINE: NEGATIVE
BLASTS RELATIVE PERCENT: 2 %
BLOOD, URINE: NEGATIVE
BUN BLDV-MCNC: 20 MG/DL (ref 7–20)
CALCIUM SERPL-MCNC: 9.8 MG/DL (ref 8.3–10.6)
CHLORIDE BLD-SCNC: 95 MMOL/L (ref 99–110)
CLARITY: CLEAR
CO2: 25 MMOL/L (ref 21–32)
COLOR: YELLOW
CREAT SERPL-MCNC: 1.3 MG/DL (ref 0.9–1.3)
EKG ATRIAL RATE: 108 BPM
EKG DIAGNOSIS: NORMAL
EKG P AXIS: 48 DEGREES
EKG P-R INTERVAL: 140 MS
EKG Q-T INTERVAL: 332 MS
EKG QRS DURATION: 90 MS
EKG QTC CALCULATION (BAZETT): 444 MS
EKG R AXIS: -10 DEGREES
EKG T AXIS: -2 DEGREES
EKG VENTRICULAR RATE: 108 BPM
EOSINOPHILS ABSOLUTE: 0 K/UL (ref 0–0.6)
EOSINOPHILS RELATIVE PERCENT: 0 %
EPITHELIAL CELLS, UA: ABNORMAL /HPF (ref 0–5)
GFR SERPL CREATININE-BSD FRML MDRD: >60 ML/MIN/{1.73_M2}
GLUCOSE BLD-MCNC: 296 MG/DL (ref 70–99)
GLUCOSE BLD-MCNC: 305 MG/DL (ref 70–99)
GLUCOSE BLD-MCNC: 399 MG/DL (ref 70–99)
GLUCOSE URINE: >=1000 MG/DL
HCT VFR BLD CALC: 34.4 % (ref 40.5–52.5)
HEMATOLOGY PATH CONSULT: YES
HEMOGLOBIN: 11.2 G/DL (ref 13.5–17.5)
KETONES, URINE: NEGATIVE MG/DL
LACTIC ACID: 2.8 MMOL/L (ref 0.4–2)
LEUKOCYTE ESTERASE, URINE: NEGATIVE
LYMPHOCYTES ABSOLUTE: 2.5 K/UL (ref 1–5.1)
LYMPHOCYTES RELATIVE PERCENT: 42 %
MACROCYTES: ABNORMAL
MCH RBC QN AUTO: 28.5 PG (ref 26–34)
MCHC RBC AUTO-ENTMCNC: 32.7 G/DL (ref 31–36)
MCV RBC AUTO: 87.3 FL (ref 80–100)
MICROSCOPIC EXAMINATION: ABNORMAL
MONOCYTES ABSOLUTE: 1 K/UL (ref 0–1.3)
MONOCYTES RELATIVE PERCENT: 17 %
MYELOCYTE PERCENT: 3 %
NEUTROPHILS ABSOLUTE: 2.3 K/UL (ref 1.7–7.7)
NEUTROPHILS RELATIVE PERCENT: 34 %
NITRITE, URINE: NEGATIVE
PDW BLD-RTO: 15.2 % (ref 12.4–15.4)
PERFORMED ON: ABNORMAL
PERFORMED ON: ABNORMAL
PH UA: 6 (ref 5–8)
PLATELET # BLD: 169 K/UL (ref 135–450)
PLATELET SLIDE REVIEW: ADEQUATE
PMV BLD AUTO: 9.4 FL (ref 5–10.5)
POLYCHROMASIA: ABNORMAL
POTASSIUM REFLEX MAGNESIUM: 3.9 MMOL/L (ref 3.5–5.1)
PROTEIN UA: NEGATIVE MG/DL
RBC # BLD: 3.94 M/UL (ref 4.2–5.9)
RBC UA: ABNORMAL /HPF (ref 0–4)
SLIDE REVIEW: ABNORMAL
SODIUM BLD-SCNC: 131 MMOL/L (ref 136–145)
SPECIFIC GRAVITY UA: 1.02 (ref 1–1.03)
TOTAL PROTEIN: 6.3 G/DL (ref 6.4–8.2)
URINE TYPE: ABNORMAL
UROBILINOGEN, URINE: 0.2 E.U./DL
WBC # BLD: 6 K/UL (ref 4–11)
WBC UA: ABNORMAL /HPF (ref 0–5)

## 2022-12-13 PROCEDURE — 96360 HYDRATION IV INFUSION INIT: CPT

## 2022-12-13 PROCEDURE — 81001 URINALYSIS AUTO W/SCOPE: CPT

## 2022-12-13 PROCEDURE — 6370000000 HC RX 637 (ALT 250 FOR IP): Performed by: EMERGENCY MEDICINE

## 2022-12-13 PROCEDURE — 96361 HYDRATE IV INFUSION ADD-ON: CPT

## 2022-12-13 PROCEDURE — 2580000003 HC RX 258

## 2022-12-13 RX ADMIN — INSULIN HUMAN 10 UNITS: 100 INJECTION, SOLUTION PARENTERAL at 02:20

## 2022-12-13 RX ADMIN — SODIUM CHLORIDE, SODIUM LACTATE, POTASSIUM CHLORIDE, AND CALCIUM CHLORIDE 1000 ML: .6; .31; .03; .02 INJECTION, SOLUTION INTRAVENOUS at 00:04

## 2022-12-13 ASSESSMENT — PAIN - FUNCTIONAL ASSESSMENT
PAIN_FUNCTIONAL_ASSESSMENT: 0-10
PAIN_FUNCTIONAL_ASSESSMENT: 0-10

## 2022-12-13 ASSESSMENT — PAIN SCALES - GENERAL: PAINLEVEL_OUTOF10: 0

## 2022-12-13 NOTE — ED PROVIDER NOTES
ED Attending Attestation Note     Date of evaluation: 12/12/2022    This patient was seen by the resident. I have seen and examined the patient, agree with the workup, evaluation, management and diagnosis. The care plan has been discussed. I have reviewed the ECG and concur with the resident's interpretation. My assessment reveals a 44-year-old male with a history of schizoaffective disorder presenting with increasing thirst, and whole body jitteriness from his care facility. His history of poorly controlled type 2 diabetes. On examination he does have normal mental status, no significant abdominal tenderness, no complaints of chest pain or respiratory issues. Mild resting tremor noted throughout his extremities that resolves with activation. Moves all extremities well to command. Carliss Leventhal, MD  12/12/22 1303

## 2022-12-13 NOTE — DISCHARGE INSTRUCTIONS
Please return to the Emergency Department if you have very high blood sugar again, chest pain, shortness of breath, vomiting, fever, or any other symptom that is concerning to you.

## 2022-12-13 NOTE — ED PROVIDER NOTES
[id number] RESIDENT NOTE       Date of evaluation: 12/12/2022    Chief Complaint     Hyperglycemia (FSBG over 450 at SNF. Patient complains of being hungry and thirsty.)    History of Present Illness     Demarco Allie is a 38 y.o. male with a past medical history of T2DM, Cushing's,  schizophrenia, HTN, who presents with hyperglycemia to 450's at facility. The patient states he has been feeling thirsty for the past 3 days in addition to episodes of vomiting that started this week. He did not vomit today and he is unsure of how many times he has vomited. He notes SOB since his last discharge. Denies chest pain, diarrhea, headache. He endorses feeling hungry and thirsty currently. He is tachycardic to 114 and hypertensive to 171/120 on presentation. Of note he had a recent admission 11/15/22 after fall and noted to be hyperglycemic to 443 on presentation. There was concern for Cushing's possibly contributing to hyperglycemia at that time and pt found to have nonsuppressible cortisol on dexamethsaone suppression test with bilateral nodular hyperplasia of adrenal glands on CT. Review of Systems     Review of Systems   Per HPI, otherwise negative. Past Medical, Surgical, Family, and Social History     He has a past medical history of COPD (chronic obstructive pulmonary disease) ([address] Essential hypertension, Hypertensive heart disease with CHF ([address] MDD (major depressive disorder), Schizoaffective disorder ([address] Systolic CHF ([address] Transsexualism, and Type 2 diabetes mellitus ([facility] [address] He has no past surgical history on file. His family history is not on file. He reports that he has been smoking cigarettes. He has never used smokeless tobacco. He reports that he does not currently use drugs after having used the following drugs: Marijuana Mago Adams). He reports that he does not drink alcohol.     Medications     Previous Medications    ACETAMINOPHEN (TYLENOL) 325 MG TABLET    Take 975 mg by mouth every 6 hours as needed for Pain    ASPIRIN 81 MG CHEWABLE TABLET    Take 81 mg by mouth daily    ATORVASTATIN (LIPITOR) 80 MG TABLET    Take 1 tablet by mouth nightly    BENZTROPINE (COGENTIN) 1 MG TABLET    Take 1 mg by mouth 2 times daily    CARVEDILOL (COREG) 25 MG TABLET    Take 50 mg by mouth 2 times daily (with meals)    DIVALPROEX (DEPAKOTE SPRINKLE) 125 MG CAPSULE    Take 500 mg by mouth 3 times daily    DULAGLUTIDE (TRULICITY) 3.82 KJ/4.8WO SOPN    Inject 0.75 mg into the skin once a week On Saturdays    ENOXAPARIN SODIUM (LOVENOX) 30 MG/0.3ML INJECTION    Inject 0.4 mLs into the skin 2 times daily    FENOFIBRATE MICRONIZED (LOFIBRA) 200 MG CAPSULE    Take 200 mg by mouth every morning (before breakfast)    FERROUS GLUCONATE (FERGON) 324 (38 FE) MG TABLET    Take 324 mg by mouth daily (with breakfast)    FUROSEMIDE (LASIX) 40 MG TABLET    Take 1 tablet by mouth daily    HALOPERIDOL (HALDOL) 20 MG TABLET    Take 20 mg by mouth 3 times daily    HYDRALAZINE (APRESOLINE) 25 MG TABLET    Take 25 mg by mouth 3 times daily    INSULIN ASPART (NOVOLOG FLEXPEN) 100 UNIT/ML INJECTION PEN    Inject into the skin 3 times daily (before meals) Inject per sliding scale: if 200-249 inject 4 units, if 250-299 inject 8 units, if 300-349 inject 10 units, if 350-399 inject 12 units    INSULIN GLARGINE (LANTUS;BASAGLAR) 100 UNIT/ML INJECTION PEN    Inject 65 Units into the skin 2 times daily    INSULIN LISPRO, 1 UNIT DIAL, (HUMALOG/ADMELOG) 100 UNIT/ML SOPN    Inject 15 Units into the skin 3 times daily (with meals)    METFORMIN (GLUCOPHAGE) 500 MG TABLET    Take 500 mg by mouth 2 times daily (with meals)    OMEPRAZOLE (PRILOSEC) 20 MG DELAYED RELEASE CAPSULE    Take 20 mg by mouth daily    QUETIAPINE (SEROQUEL) 200 MG TABLET    Take 200 mg by mouth 4 times daily    SENNA (SENOKOT) 8.6 MG TABLET    Take 1 tablet by mouth 2 times daily    SPIRONOLACTONE (ALDACTONE) 50 MG TABLET Take 2 tablets by mouth daily    VITAMIN B-12 (CYANOCOBALAMIN) 1000 MCG TABLET    Take 1,000 mcg by mouth daily       Allergies     He has No Known Allergies. Physical Exam     INITIAL VITALS: BP: (!) 171/120, Temp: 97 °F (36.1 °C), Heart Rate: (!) 114, Resp: 22, SpO2: 98 %   Physical Exam  Constitutional:       General: He is not in acute distress. Appearance: Normal appearance. He is obese. He is not ill-appearing or toxic-appearing. HENT:      Head: Normocephalic and atraumatic. Nose: Nose normal.      Mouth/Throat:      Mouth: Mucous membranes are dry. Pharynx: Oropharynx is clear. Eyes:      Extraocular Movements: Extraocular movements intact. Conjunctiva/sclera: Conjunctivae normal.      Pupils: Pupils are equal, round, and reactive to light. Cardiovascular:      Rate and Rhythm: Regular rhythm. Tachycardia present. Pulses: Normal pulses. Heart sounds: Normal heart sounds. Pulmonary:      Effort: Pulmonary effort is normal.      Breath sounds: Normal breath sounds. Comments: Limited due to body habitus  Abdominal:      General: There is no distension. Palpations: Abdomen is soft. Tenderness: There is abdominal tenderness. There is no guarding or rebound. Comments: L sided tenderness with palpation   Musculoskeletal:         General: Normal range of motion. Cervical back: Normal range of motion and neck supple. Comments: Trace edema bilaterally   Skin:     General: Skin is warm and dry. Capillary Refill: Capillary refill takes less than 2 seconds. Neurological:      General: No focal deficit present. Mental Status: He is alert. Cranial Nerves: No cranial nerve deficit. Sensory: No sensory deficit. Motor: No weakness.    Psychiatric:         Mood and Affect: Mood normal.         Behavior: Behavior normal.      Comments: Blunted affect       DiagnosticResults     EKG   Interpreted in conjunction with emergencydepartment physician No att. providers found  Clinical Impression: Sinus tachycardia without acute ischemia  Comparison:  11/22/22    RADIOLOGY:  No orders to display       LABS:   Results for orders placed or performed during the hospital encounter of 12/12/22   Comprehensive Metabolic Panel w/ Reflex to MG   Result Value Ref Range    Sodium 131 (L) 136 - 145 mmol/L    Potassium reflex Magnesium 3.9 3.5 - 5.1 mmol/L    Chloride 95 (L) 99 - 110 mmol/L    CO2 25 21 - 32 mmol/L    Anion Gap 11 3 - 16    Glucose 399 (H) 70 - 99 mg/dL    BUN 20 7 - 20 mg/dL    Creatinine 1.3 0.9 - 1.3 mg/dL    Est, Glom Filt Rate >60 >60    Calcium 9.8 8.3 - 10.6 mg/dL    Total Protein 6.3 (L) 6.4 - 8.2 g/dL    Albumin 3.9 3.4 - 5.0 g/dL    Albumin/Globulin Ratio 1.6 1.1 - 2.2    Total Bilirubin <0.2 0.0 - 1.0 mg/dL    Alkaline Phosphatase 96 40 - 129 U/L    ALT 56 (H) 10 - 40 U/L    AST 17 15 - 37 U/L   CBC with Auto Differential   Result Value Ref Range    WBC 6.0 4.0 - 11.0 K/uL    RBC 3.94 (L) 4.20 - 5.90 M/uL    Hemoglobin 11.2 (L) 13.5 - 17.5 g/dL    Hematocrit 34.4 (L) 40.5 - 52.5 %    MCV 87.3 80.0 - 100.0 fL    MCH 28.5 26.0 - 34.0 pg    MCHC 32.7 31.0 - 36.0 g/dL    RDW 15.2 12.4 - 15.4 %    Platelets 516 345 - 151 K/uL    MPV 9.4 5.0 - 10.5 fL    PLATELET SLIDE REVIEW Adequate     SLIDE REVIEW see below     Path Consult Yes     Neutrophils % 34.0 %    Lymphocytes % 42.0 %    Monocytes % 17.0 %    Eosinophils % 0.0 %    Basophils % 0.0 %    Neutrophils Absolute 2.3 1.7 - 7.7 K/uL    Lymphocytes Absolute 2.5 1.0 - 5.1 K/uL    Monocytes Absolute 1.0 0.0 - 1.3 K/uL    Eosinophils Absolute 0.0 0.0 - 0.6 K/uL    Basophils Absolute 0.0 0.0 - 0.2 K/uL    Bands Relative 2 0 - 7 %    Myelocyte Percent 3 (A) %    Blasts Relative 2 (A) %    Macrocytes Occasional (A)     Polychromasia Occasional (A)    Lactic Acid   Result Value Ref Range    Lactic Acid 2.8 (H) 0.4 - 2.0 mmol/L   Blood gas, venous (Lab)   Result Value Ref Range pH, Lewis 7.426 7.350 - 7.450    pCO2, Lewis 41.6 41.0 - 51.0 mmHg    pO2, Lewis 50.1 (H) 25.0 - 40.0 mmHg    HCO3, Venous 27.3 24.0 - 28.0 mmol/L    Base Excess, Lewis 2.6 -2.0 - 3.0 mmol/L    O2 Sat, Lewis 85 Not established %    Carboxyhemoglobin 1.9 (H) 0.0 - 1.5 %    MetHgb, Lewis 0.5 0.0 - 1.5 %    TC02 (Calc), Lewis 29 mmol/L    Hemoglobin, Lewis, Reduced 14.60 %   Urinalysis with Microscopic   Result Value Ref Range    Urine Type Voided    POCT Glucose   Result Value Ref Range    POC Glucose 396 (H) 70 - 99 mg/dl    Performed on ACCU-CHEK        ED BEDSIDE ULTRASOUND:  No results found. RECENT VITALS:  BP: (!) 171/120, Temp: 97 °F (36.1 °C), Heart Rate: 96,Resp: 22, SpO2: 98 %     Procedures     none    ED Course     Nursing Notes, Past Medical Hx, Past Surgical Hx, Social Hx, Allergies, and Family Hx were reviewed. The patient was given the following medications:  Orders Placed This Encounter   Medications    DISCONTD: lactated ringers infusion 1,000 mL    DISCONTD: lactated ringers bolus    lactated ringers bolus       CONSULTS:  None    MEDICAL DECISION MAKING / ASSESSMENT / Wilverrashid Ryan is a 38 y.o. male who presents from facility with hyperglycemia. Initial glucose in . EKG without acute arrhythmia or ischemia. WBC wnl, hgb at baseline. He does not have severe electrolyte or metabolic derangements although his lactate is elevated to 2.8. Pt received 2L LR bolus in ED. At this time I will be signing off service. Further care and follow up will be assumed by the attending physician Dr. Imani Montoya. This patient was also evaluated by the attending physician. All care plans were discussed and agreed upon. Clinical Impression     1.  Hyperglycemia        Disposition     PATIENT REFERRED TO:  Lewis Pressley MD  [address] [facility]  [phone]    Schedule an appointment as soon as possible for a visit in 1 week  As needed    DISCHARGE MEDICATIONS:  New Prescriptions    No medications on file       DISPOSITION       Pending     MD Isabella Vaca MD  Resident  12/13/22 0833

## 2022-12-19 ENCOUNTER — OFFICE VISIT (OUTPATIENT)
Dept: INTERNAL MEDICINE CLINIC | Age: 42
End: 2022-12-19
Payer: COMMERCIAL

## 2022-12-19 VITALS — TEMPERATURE: 96.9 F

## 2022-12-19 DIAGNOSIS — B35.1 ONYCHOMYCOSIS: ICD-10-CM

## 2022-12-19 DIAGNOSIS — E11.42 TYPE 2 DIABETES MELLITUS WITH DIABETIC POLYNEUROPATHY, WITH LONG-TERM CURRENT USE OF INSULIN (HCC): ICD-10-CM

## 2022-12-19 DIAGNOSIS — S82.832D CLOSED FRACTURE OF DISTAL END OF LEFT FIBULA WITH ROUTINE HEALING, UNSPECIFIED FRACTURE MORPHOLOGY, SUBSEQUENT ENCOUNTER: Primary | ICD-10-CM

## 2022-12-19 DIAGNOSIS — Z79.4 TYPE 2 DIABETES MELLITUS WITH DIABETIC POLYNEUROPATHY, WITH LONG-TERM CURRENT USE OF INSULIN (HCC): ICD-10-CM

## 2022-12-19 PROCEDURE — 11721 DEBRIDE NAIL 6 OR MORE: CPT

## 2022-12-19 PROCEDURE — 99213 OFFICE O/P EST LOW 20 MIN: CPT

## 2022-12-19 NOTE — PATIENT INSTRUCTIONS
Get xray and return in 3 weeks orders given to his care taker who came with him  [facility]

## 2022-12-20 NOTE — PROGRESS NOTES
Department of Podiatry  Resident Progress Note    Nancy Bassett  Allergies: Patient has no known allergies. SUBJECTIVE  The patient is a 38 y.o. male who presents with follow-up from the hospital for left ankle sprain. Plan had been for surgical intervention during hospitalization however was unable to contact patient's power of  for medical decision making and surgical consent. Patient has been ambulating in the walking boot however relates that it is uncomfortable to wear while sitting down. Patient relates improvement in pain to the left ankle. Past Medical History:        Diagnosis Date    COPD (chronic obstructive pulmonary disease) ([address]     Essential hypertension     Hypertensive heart disease with CHF (HCC)     MDD (major depressive disorder)     Schizoaffective disorder (HCC)     Systolic CHF ([address]     Transsexualism     Type 2 diabetes mellitus ([facility] [address]        REVIEW OF SYSTEMS:  CONSTITUTIONAL:  negative    OBJECTIVE  VASCULAR: DP and PT pulses are palpable 1/4 b/l. CFT is brisk to the digits of the foot b/l. Skin temperature is warm to cool from proximal to distal with no focal calor noted. Mild non-pitting edema noted lateral malleolus left. No pain with calf compression b/l. NEUROLOGIC: Gross and epicritic sensation is diminished b/l. Protective sensation is absent at all pedal sites b/l. DERMATOLOGIC: Nails 1-5 b/l are elongated, thick, slightly discolored. Webspaces 1-4 b/l are clean, dry, and intact. No hyperkeratosis noted. No open wounds noted. No subcutaneous nodules, rashes, or other skin lesions noted. MUSCULOSKELETAL: Muscle strength is 5/5 for all pedal groups tested. Mild pain on palpation of the left lateral malleolus. no pain through range of motion or with muscle strength examination. Ankle joint ROM is decreased in dorsiflexion with the knee extended. No obvious biomechanical abnormalities.        IMAGING  XR b/l ankle:  Narrative   History: Status post fall with bilateral ankle pain. COMPARISON: None. Right ankle:       FINDINGS: AP, lateral, and mortise views of the right ankle were obtained. Alignment is anatomic. Normal joint spaces. No fracture or dislocation. Impression   1. No acute findings in the right ankle. Left ankle:       FINDINGS: AP, lateral, and mortise views of the left ankle were obtained. There is a nondisplaced comminuted fracture of the distal shaft of the left fibula with incomplete healing. There is abundant surrounding periosteal reaction and callus formation    with ossification extending into the interosseous membrane. No other fractures. IMPRESSION:   1. Comminuted nondisplaced incompletely healed fracture at the distal shaft of the left fibula. 2. Ossification extending from the distal shaft of the left fibula into the tibiofibular interosseous membrane likely the sequela of trauma. 3. No dislocation. CT left ankle:  Narrative   History: Comminuted fibular fracture. Left ankle pain. CT of the left ankle without contrast.       TECHNIQUE: CT images were obtained of the left ankle without contrast.       FINDINGS:       Extensive heterotopic ossification surrounding the lower fibula along the interosseous membrane and surrounding the lower tibia consistent with sequela of remote trauma possibly representing a remote interosseous ligament injury. There is incomplete    osseous bridging of the ligamenta space. The fibula demonstrates a comminuted what appears represent acute fracture distally with distraction of the distal fibular tip. There is adjacent soft tissue swelling. No dislocation. No radiopaque foreign body. Impression   1. 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  Superimposed acute distal fibular fracture with comminution and    distraction. ASSESSMENT  1. Left fibular fracture acute with chronic changes noted to the syndesmosis  2. Left ankle pain  3. Onychomycosis  4. Diabetes 3. Peripheral neuropathy    PLAN  -Evaluation and management x 15 minutes and greater than 50% of the time spent explaining the etiology and treatment with the patient.   -Discussed with patient the importance of the continuation of wearing the walking boot to allow for optimal fracture healing. Despite patient's minimal pain, concern for extensive neuropathy masking symptoms due to nature of previous fracture.  -Discussed that further imaging would be best at this time to assess progression of left fibular fracture  -Surgical planning had been discussed while patient was in the hospital but was unable to obtain surgical consent from [address] [place] [address]. Related to the patient that surgical intervention may still be necessary pending x-ray imaging to allow for more stable ankle joint.  -Order sent for x-rays left ankle and patient instructed to obtain imaging prior to next appointment  -Verbal consent was obtained prior to nail debridement. Nails 1 through 5 bilaterally were debrided in thickness and length using sterile nail nippers without incident. Patient tolerated well  -Patient to return to clinic in 3 weeks for follow-up repeat left ankle x-rays. Assessment and plan discussed with CANDIDO Jackson.PCarlosM.   Carol Ann Mckeon, ALEXM   Podiatric Resident PGY1  Pager [phone] or [facility]  12/20/2022, 7:00 AM

## 2022-12-21 ENCOUNTER — TELEPHONE (OUTPATIENT)
Dept: INTERNAL MEDICINE CLINIC | Age: 42
End: 2022-12-21

## 2022-12-21 DIAGNOSIS — S82.892G CLOSED FRACTURE OF LEFT ANKLE WITH DELAYED HEALING, SUBSEQUENT ENCOUNTER: Primary | ICD-10-CM

## 2022-12-21 NOTE — TELEPHONE ENCOUNTER
Maverick Stoner  from nursing home called. Patient is refusing to wear walking boot. Also patient is wanting the X-ray of foot to be done.  not available.  perfect serve note sent/read by . Awaiting response. Order for left ankle imaging faxed to [facility].

## 2023-01-09 ENCOUNTER — OFFICE VISIT (OUTPATIENT)
Dept: INTERNAL MEDICINE CLINIC | Age: 43
End: 2023-01-09
Payer: COMMERCIAL

## 2023-01-09 DIAGNOSIS — S82.452G: Primary | ICD-10-CM

## 2023-01-09 DIAGNOSIS — S82.892G CLOSED FRACTURE OF LEFT ANKLE WITH DELAYED HEALING, SUBSEQUENT ENCOUNTER: ICD-10-CM

## 2023-01-09 PROCEDURE — 99213 OFFICE O/P EST LOW 20 MIN: CPT

## 2023-01-10 VITALS — TEMPERATURE: 98 F

## 2023-01-10 NOTE — PROGRESS NOTES
Department of Podiatry  Resident Progress Note    Toshia [place]  Allergies: Patient has no known allergies. SUBJECTIVE  The patient is a 38 y.o. male who presents with follow-up from the hospital for left ankle fracture. Patient presents for follow up of xray films, however has not obtained them since the previous visit. The patient is no scheduled for nail care at this time. Patient denies any other pedal complaints. Patient denies and N/V/F/C. Past Medical History:        Diagnosis Date    COPD (chronic obstructive pulmonary disease) ([facility] [address]     Essential hypertension     Hypertensive heart disease with CHF (HCC)     MDD (major depressive disorder)     Schizoaffective disorder (HCC)     Systolic CHF ([facility] [address]     Transsexualism     Type 2 diabetes mellitus ([facility] [address]        REVIEW OF SYSTEMS:  CONSTITUTIONAL:  negative    OBJECTIVE  VASCULAR: DP and PT pulses are palpable 1/4 b/l. CFT is brisk to the digits of the foot b/l. Skin temperature is warm to cool from proximal to distal with no focal calor noted. Mild non-pitting edema noted lateral malleolus left. No pain with calf compression b/l. NEUROLOGIC: Gross and epicritic sensation is diminished b/l. Protective sensation is absent at all pedal sites b/l. DERMATOLOGIC: Nails 1-5 b/l are elongated, thick, slightly discolored. Webspaces 1-4 b/l are clean, dry, and intact. No hyperkeratosis noted. No open wounds noted. No subcutaneous nodules, rashes, or other skin lesions noted. MUSCULOSKELETAL: Muscle strength is 5/5 for all pedal groups tested. Mild pain on palpation of the left lateral malleolus. no pain through range of motion or with muscle strength examination. Ankle joint ROM is decreased in dorsiflexion with the knee extended. No obvious biomechanical abnormalities. IMAGING  XR b/l ankle:  Narrative   History: Status post fall with bilateral ankle pain. COMPARISON: None.        Right ankle:       FINDINGS: AP, lateral, and mortise views of the right ankle were obtained. Alignment is anatomic. Normal joint spaces. No fracture or dislocation. Impression   1. No acute findings in the right ankle. Left ankle:       FINDINGS: AP, lateral, and mortise views of the left ankle were obtained. There is a nondisplaced comminuted fracture of the distal shaft of the left fibula with incomplete healing. There is abundant surrounding periosteal reaction and callus formation    with ossification extending into the interosseous membrane. No other fractures. IMPRESSION:   1. Comminuted nondisplaced incompletely healed fracture at the distal shaft of the left fibula. 2. Ossification extending from the distal shaft of the left fibula into the tibiofibular interosseous membrane likely the sequela of trauma. 3. No dislocation. CT left ankle:  Narrative   History: Comminuted fibular fracture. Left ankle pain. CT of the left ankle without contrast.       TECHNIQUE: CT images were obtained of the left ankle without contrast.       FINDINGS:       Extensive heterotopic ossification surrounding the lower fibula along the interosseous membrane and surrounding the lower tibia consistent with sequela of remote trauma possibly representing a remote interosseous ligament injury. There is incomplete    osseous bridging of the ligamenta space. The fibula demonstrates a comminuted what appears represent acute fracture distally with distraction of the distal fibular tip. There is adjacent soft tissue swelling. No dislocation. No radiopaque foreign body. Impression   1. 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 Superimposed acute distal fibular fracture with comminution and    distraction. ASSESSMENT  1.   Left fibular fracture acute with chronic changes noted to the syndesmosis  2. Left ankle pain  3. Onychomycosis  4. Diabetes 3. Peripheral neuropathy    PLAN  -Evaluation and management x 15 minutes and greater than 50% of the time spent explaining the etiology and treatment with the patient.   -Discussed that further imaging would be best at this time to assess progression of left fibular fracture  -Surgical planning had been discussed while patient was in the hospital but was unable to obtain surgical consent from [place]. Related to the patient that surgical intervention may still be necessary pending x-ray imaging to allow for more stable ankle joint.  -Order sent for x-rays left ankle and patient instructed to obtain imaging prior to next appointment  -Patient to return to clinic in 1 week for follow-up repeat left ankle x-rays. Assessment and plan discussed with CANDIDO Yu.P.M.   Elizabeth Cowan DPM   Podiatric Resident PGY1  Pager [phone] or [facility]  1/9/2023, 10:29 PM

## 2023-02-11 ENCOUNTER — APPOINTMENT (OUTPATIENT)
Dept: GENERAL RADIOLOGY | Age: 43
DRG: 291 | End: 2023-02-11
Payer: COMMERCIAL

## 2023-02-11 ENCOUNTER — HOSPITAL ENCOUNTER (INPATIENT)
Age: 43
LOS: 4 days | Discharge: NURSING, REHAB OR LONG-TERM CARE | DRG: 291 | End: 2023-02-15
Attending: STUDENT IN AN ORGANIZED HEALTH CARE EDUCATION/TRAINING PROGRAM | Admitting: INTERNAL MEDICINE
Payer: COMMERCIAL

## 2023-02-11 DIAGNOSIS — J96.01 ACUTE RESPIRATORY FAILURE WITH HYPOXIA (HCC): ICD-10-CM

## 2023-02-11 DIAGNOSIS — I50.33 ACUTE ON CHRONIC DIASTOLIC CONGESTIVE HEART FAILURE (HCC): ICD-10-CM

## 2023-02-11 DIAGNOSIS — I50.9 ACUTE ON CHRONIC CONGESTIVE HEART FAILURE, UNSPECIFIED HEART FAILURE TYPE (HCC): Primary | ICD-10-CM

## 2023-02-11 DIAGNOSIS — I21.4 NSTEMI (NON-ST ELEVATED MYOCARDIAL INFARCTION) (HCC): ICD-10-CM

## 2023-02-11 PROBLEM — I50.43 ACUTE ON CHRONIC COMBINED SYSTOLIC AND DIASTOLIC HEART FAILURE (HCC): Status: ACTIVE | Noted: 2023-02-11

## 2023-02-11 PROBLEM — J96.21 ACUTE ON CHRONIC RESPIRATORY FAILURE WITH HYPOXEMIA (HCC): Status: ACTIVE | Noted: 2023-02-11

## 2023-02-11 LAB
A/G RATIO: 1.6 (ref 1.1–2.2)
ALBUMIN SERPL-MCNC: 3.7 G/DL (ref 3.4–5)
ALP BLD-CCNC: 93 U/L (ref 40–129)
ALT SERPL-CCNC: 101 U/L (ref 10–40)
ANION GAP SERPL CALCULATED.3IONS-SCNC: 13 MMOL/L (ref 3–16)
AST SERPL-CCNC: 42 U/L (ref 15–37)
BASE EXCESS VENOUS: 3.6 MMOL/L (ref -2–3)
BASOPHILS ABSOLUTE: 0.1 K/UL (ref 0–0.2)
BASOPHILS RELATIVE PERCENT: 0.8 %
BILIRUB SERPL-MCNC: 0.3 MG/DL (ref 0–1)
BUN BLDV-MCNC: 22 MG/DL (ref 7–20)
CALCIUM SERPL-MCNC: 9.4 MG/DL (ref 8.3–10.6)
CARBOXYHEMOGLOBIN: 2.1 % (ref 0–1.5)
CHLORIDE BLD-SCNC: 98 MMOL/L (ref 99–110)
CO2: 26 MMOL/L (ref 21–32)
CREAT SERPL-MCNC: 1.5 MG/DL (ref 0.9–1.3)
EOSINOPHILS ABSOLUTE: 0 K/UL (ref 0–0.6)
EOSINOPHILS RELATIVE PERCENT: 0.1 %
GFR SERPL CREATININE-BSD FRML MDRD: 60 ML/MIN/{1.73_M2}
GLUCOSE BLD-MCNC: 270 MG/DL (ref 70–99)
HCO3 VENOUS: 29.2 MMOL/L (ref 24–28)
HCT VFR BLD CALC: 39.9 % (ref 40.5–52.5)
HEMOGLOBIN, VEN, REDUCED: 22.4 %
HEMOGLOBIN: 12.7 G/DL (ref 13.5–17.5)
LACTIC ACID: 2.1 MMOL/L (ref 0.4–2)
LYMPHOCYTES ABSOLUTE: 1.7 K/UL (ref 1–5.1)
LYMPHOCYTES RELATIVE PERCENT: 19.3 %
MCH RBC QN AUTO: 26.9 PG (ref 26–34)
MCHC RBC AUTO-ENTMCNC: 31.9 G/DL (ref 31–36)
MCV RBC AUTO: 84.2 FL (ref 80–100)
METHEMOGLOBIN VENOUS: 0.5 % (ref 0–1.5)
MONOCYTES ABSOLUTE: 1.2 K/UL (ref 0–1.3)
MONOCYTES RELATIVE PERCENT: 14.1 %
NEUTROPHILS ABSOLUTE: 5.7 K/UL (ref 1.7–7.7)
NEUTROPHILS RELATIVE PERCENT: 65.7 %
O2 SAT, VEN: 77 %
PCO2, VEN: 46.8 MMHG (ref 41–51)
PDW BLD-RTO: 14.8 % (ref 12.4–15.4)
PH VENOUS: 7.4 (ref 7.35–7.45)
PLATELET # BLD: 179 K/UL (ref 135–450)
PMV BLD AUTO: 9.9 FL (ref 5–10.5)
PO2, VEN: 44.3 MMHG (ref 25–40)
POTASSIUM REFLEX MAGNESIUM: 3.8 MMOL/L (ref 3.5–5.1)
PRO-BNP: 3838 PG/ML (ref 0–124)
RBC # BLD: 4.74 M/UL (ref 4.2–5.9)
SODIUM BLD-SCNC: 137 MMOL/L (ref 136–145)
TCO2 CALC VENOUS: 31 MMOL/L
TOTAL PROTEIN: 6 G/DL (ref 6.4–8.2)
TROPONIN: 0.08 NG/ML
TROPONIN: 0.08 NG/ML
WBC # BLD: 8.6 K/UL (ref 4–11)

## 2023-02-11 PROCEDURE — 2060000000 HC ICU INTERMEDIATE R&B

## 2023-02-11 PROCEDURE — 6370000000 HC RX 637 (ALT 250 FOR IP): Performed by: STUDENT IN AN ORGANIZED HEALTH CARE EDUCATION/TRAINING PROGRAM

## 2023-02-11 PROCEDURE — 96374 THER/PROPH/DIAG INJ IV PUSH: CPT

## 2023-02-11 PROCEDURE — 93005 ELECTROCARDIOGRAM TRACING: CPT | Performed by: STUDENT IN AN ORGANIZED HEALTH CARE EDUCATION/TRAINING PROGRAM

## 2023-02-11 PROCEDURE — 83605 ASSAY OF LACTIC ACID: CPT

## 2023-02-11 PROCEDURE — 71045 X-RAY EXAM CHEST 1 VIEW: CPT

## 2023-02-11 PROCEDURE — 84484 ASSAY OF TROPONIN QUANT: CPT

## 2023-02-11 PROCEDURE — 94660 CPAP INITIATION&MGMT: CPT

## 2023-02-11 PROCEDURE — 2580000003 HC RX 258: Performed by: STUDENT IN AN ORGANIZED HEALTH CARE EDUCATION/TRAINING PROGRAM

## 2023-02-11 PROCEDURE — 94761 N-INVAS EAR/PLS OXIMETRY MLT: CPT

## 2023-02-11 PROCEDURE — 6360000002 HC RX W HCPCS: Performed by: STUDENT IN AN ORGANIZED HEALTH CARE EDUCATION/TRAINING PROGRAM

## 2023-02-11 PROCEDURE — 85025 COMPLETE CBC W/AUTO DIFF WBC: CPT

## 2023-02-11 PROCEDURE — 82803 BLOOD GASES ANY COMBINATION: CPT

## 2023-02-11 PROCEDURE — 80053 COMPREHEN METABOLIC PANEL: CPT

## 2023-02-11 PROCEDURE — 2700000000 HC OXYGEN THERAPY PER DAY

## 2023-02-11 PROCEDURE — 83880 ASSAY OF NATRIURETIC PEPTIDE: CPT

## 2023-02-11 PROCEDURE — 99285 EMERGENCY DEPT VISIT HI MDM: CPT

## 2023-02-11 RX ORDER — POLYETHYLENE GLYCOL 3350 17 G/17G
17 POWDER, FOR SOLUTION ORAL DAILY PRN
Status: DISCONTINUED | OUTPATIENT
Start: 2023-02-11 | End: 2023-02-15 | Stop reason: HOSPADM

## 2023-02-11 RX ORDER — ENOXAPARIN SODIUM 100 MG/ML
40 INJECTION SUBCUTANEOUS 2 TIMES DAILY
Status: CANCELLED | OUTPATIENT
Start: 2023-02-11

## 2023-02-11 RX ORDER — DIVALPROEX SODIUM 125 MG/1
500 CAPSULE, COATED PELLETS ORAL 3 TIMES DAILY
Status: DISCONTINUED | OUTPATIENT
Start: 2023-02-11 | End: 2023-02-15 | Stop reason: HOSPADM

## 2023-02-11 RX ORDER — FUROSEMIDE 10 MG/ML
40 INJECTION INTRAMUSCULAR; INTRAVENOUS 2 TIMES DAILY
Status: DISCONTINUED | OUTPATIENT
Start: 2023-02-11 | End: 2023-02-13

## 2023-02-11 RX ORDER — ATORVASTATIN CALCIUM 80 MG/1
80 TABLET, FILM COATED ORAL NIGHTLY
Status: DISCONTINUED | OUTPATIENT
Start: 2023-02-11 | End: 2023-02-15 | Stop reason: HOSPADM

## 2023-02-11 RX ORDER — ACETAMINOPHEN 325 MG/1
650 TABLET ORAL EVERY 6 HOURS PRN
Status: DISCONTINUED | OUTPATIENT
Start: 2023-02-11 | End: 2023-02-15 | Stop reason: HOSPADM

## 2023-02-11 RX ORDER — FUROSEMIDE 10 MG/ML
40 INJECTION INTRAMUSCULAR; INTRAVENOUS ONCE
Status: COMPLETED | OUTPATIENT
Start: 2023-02-11 | End: 2023-02-11

## 2023-02-11 RX ORDER — LABETALOL HYDROCHLORIDE 5 MG/ML
5 INJECTION, SOLUTION INTRAVENOUS EVERY 4 HOURS PRN
Status: DISCONTINUED | OUTPATIENT
Start: 2023-02-11 | End: 2023-02-15 | Stop reason: HOSPADM

## 2023-02-11 RX ORDER — HYDRALAZINE HYDROCHLORIDE 25 MG/1
25 TABLET, FILM COATED ORAL 3 TIMES DAILY
Status: DISCONTINUED | OUTPATIENT
Start: 2023-02-11 | End: 2023-02-14

## 2023-02-11 RX ORDER — GLUCAGON 1 MG/ML
1 KIT INJECTION PRN
Status: DISCONTINUED | OUTPATIENT
Start: 2023-02-11 | End: 2023-02-15 | Stop reason: HOSPADM

## 2023-02-11 RX ORDER — SODIUM CHLORIDE 0.9 % (FLUSH) 0.9 %
5-40 SYRINGE (ML) INJECTION EVERY 12 HOURS SCHEDULED
Status: DISCONTINUED | OUTPATIENT
Start: 2023-02-11 | End: 2023-02-15 | Stop reason: HOSPADM

## 2023-02-11 RX ORDER — PANTOPRAZOLE SODIUM 40 MG/1
40 GRANULE, DELAYED RELEASE ORAL
Status: ON HOLD | COMMUNITY
End: 2023-02-15 | Stop reason: HOSPADM

## 2023-02-11 RX ORDER — CARVEDILOL 25 MG/1
50 TABLET ORAL 2 TIMES DAILY WITH MEALS
Status: DISCONTINUED | OUTPATIENT
Start: 2023-02-12 | End: 2023-02-15 | Stop reason: HOSPADM

## 2023-02-11 RX ORDER — ASPIRIN 81 MG/1
81 TABLET, CHEWABLE ORAL DAILY
Status: DISCONTINUED | OUTPATIENT
Start: 2023-02-12 | End: 2023-02-15 | Stop reason: HOSPADM

## 2023-02-11 RX ORDER — SODIUM CHLORIDE 9 MG/ML
INJECTION, SOLUTION INTRAVENOUS PRN
Status: DISCONTINUED | OUTPATIENT
Start: 2023-02-11 | End: 2023-02-15 | Stop reason: HOSPADM

## 2023-02-11 RX ORDER — QUETIAPINE FUMARATE 200 MG/1
200 TABLET, FILM COATED ORAL 4 TIMES DAILY
Status: DISCONTINUED | OUTPATIENT
Start: 2023-02-11 | End: 2023-02-15 | Stop reason: HOSPADM

## 2023-02-11 RX ORDER — ONDANSETRON 4 MG/1
4 TABLET, ORALLY DISINTEGRATING ORAL EVERY 8 HOURS PRN
Status: DISCONTINUED | OUTPATIENT
Start: 2023-02-11 | End: 2023-02-15 | Stop reason: HOSPADM

## 2023-02-11 RX ORDER — ONDANSETRON 2 MG/ML
4 INJECTION INTRAMUSCULAR; INTRAVENOUS EVERY 6 HOURS PRN
Status: DISCONTINUED | OUTPATIENT
Start: 2023-02-11 | End: 2023-02-15 | Stop reason: HOSPADM

## 2023-02-11 RX ORDER — SPIRONOLACTONE 50 MG/1
50 TABLET, FILM COATED ORAL DAILY
Status: DISCONTINUED | OUTPATIENT
Start: 2023-02-11 | End: 2023-02-15 | Stop reason: HOSPADM

## 2023-02-11 RX ORDER — INSULIN LISPRO 100 [IU]/ML
0-4 INJECTION, SOLUTION INTRAVENOUS; SUBCUTANEOUS NIGHTLY
Status: DISCONTINUED | OUTPATIENT
Start: 2023-02-11 | End: 2023-02-15 | Stop reason: HOSPADM

## 2023-02-11 RX ORDER — INSULIN LISPRO 100 [IU]/ML
0-16 INJECTION, SOLUTION INTRAVENOUS; SUBCUTANEOUS
Status: DISCONTINUED | OUTPATIENT
Start: 2023-02-12 | End: 2023-02-15 | Stop reason: HOSPADM

## 2023-02-11 RX ORDER — DEXTROSE MONOHYDRATE 100 MG/ML
INJECTION, SOLUTION INTRAVENOUS CONTINUOUS PRN
Status: DISCONTINUED | OUTPATIENT
Start: 2023-02-11 | End: 2023-02-15 | Stop reason: HOSPADM

## 2023-02-11 RX ORDER — ACETAMINOPHEN 650 MG/1
650 SUPPOSITORY RECTAL EVERY 6 HOURS PRN
Status: DISCONTINUED | OUTPATIENT
Start: 2023-02-11 | End: 2023-02-15 | Stop reason: HOSPADM

## 2023-02-11 RX ORDER — HYDRALAZINE HYDROCHLORIDE 20 MG/ML
5 INJECTION INTRAMUSCULAR; INTRAVENOUS EVERY 4 HOURS PRN
Status: DISCONTINUED | OUTPATIENT
Start: 2023-02-11 | End: 2023-02-15 | Stop reason: HOSPADM

## 2023-02-11 RX ORDER — SODIUM CHLORIDE 0.9 % (FLUSH) 0.9 %
5-40 SYRINGE (ML) INJECTION PRN
Status: DISCONTINUED | OUTPATIENT
Start: 2023-02-11 | End: 2023-02-15 | Stop reason: HOSPADM

## 2023-02-11 RX ORDER — HEPARIN SODIUM 5000 [USP'U]/ML
5000 INJECTION, SOLUTION INTRAVENOUS; SUBCUTANEOUS EVERY 8 HOURS SCHEDULED
Status: DISCONTINUED | OUTPATIENT
Start: 2023-02-11 | End: 2023-02-15 | Stop reason: HOSPADM

## 2023-02-11 RX ADMIN — HEPARIN SODIUM 5000 UNITS: 5000 INJECTION INTRAVENOUS; SUBCUTANEOUS at 23:56

## 2023-02-11 RX ADMIN — ATORVASTATIN CALCIUM 80 MG: 80 TABLET, FILM COATED ORAL at 23:56

## 2023-02-11 RX ADMIN — QUETIAPINE FUMARATE 200 MG: 200 TABLET ORAL at 23:56

## 2023-02-11 RX ADMIN — FUROSEMIDE 40 MG: 10 INJECTION, SOLUTION INTRAMUSCULAR; INTRAVENOUS at 19:22

## 2023-02-11 RX ADMIN — ACETAMINOPHEN 650 MG: 325 TABLET ORAL at 23:56

## 2023-02-11 RX ADMIN — SODIUM CHLORIDE, PRESERVATIVE FREE 10 ML: 5 INJECTION INTRAVENOUS at 23:57

## 2023-02-11 RX ADMIN — POTASSIUM BICARBONATE 40 MEQ: 782 TABLET, EFFERVESCENT ORAL at 23:56

## 2023-02-11 RX ADMIN — NITROGLYCERIN 0.5 INCH: 20 OINTMENT TOPICAL at 18:30

## 2023-02-11 RX ADMIN — INSULIN GLARGINE 65 UNITS: 100 INJECTION, SOLUTION SUBCUTANEOUS at 23:57

## 2023-02-11 RX ADMIN — HYDRALAZINE HYDROCHLORIDE 25 MG: 25 TABLET, FILM COATED ORAL at 23:56

## 2023-02-11 RX ADMIN — FUROSEMIDE 40 MG: 10 INJECTION, SOLUTION INTRAMUSCULAR; INTRAVENOUS at 23:56

## 2023-02-11 RX ADMIN — SPIRONOLACTONE 50 MG: 50 TABLET ORAL at 23:56

## 2023-02-11 ASSESSMENT — ENCOUNTER SYMPTOMS
GASTROINTESTINAL NEGATIVE: 1
SHORTNESS OF BREATH: 1

## 2023-02-11 ASSESSMENT — PAIN - FUNCTIONAL ASSESSMENT: PAIN_FUNCTIONAL_ASSESSMENT: ACTIVITIES ARE NOT PREVENTED

## 2023-02-11 ASSESSMENT — PAIN DESCRIPTION - FREQUENCY: FREQUENCY: CONTINUOUS

## 2023-02-11 ASSESSMENT — PAIN DESCRIPTION - DESCRIPTORS: DESCRIPTORS: ACHING

## 2023-02-11 ASSESSMENT — PAIN DESCRIPTION - ORIENTATION: ORIENTATION: MID

## 2023-02-11 ASSESSMENT — PAIN SCALES - GENERAL
PAINLEVEL_OUTOF10: 0
PAINLEVEL_OUTOF10: 10

## 2023-02-11 ASSESSMENT — PAIN DESCRIPTION - PAIN TYPE: TYPE: CHRONIC PAIN

## 2023-02-11 ASSESSMENT — PAIN DESCRIPTION - LOCATION: LOCATION: NECK

## 2023-02-11 ASSESSMENT — PAIN DESCRIPTION - ONSET: ONSET: ON-GOING

## 2023-02-11 NOTE — ED PROVIDER NOTES
[address] [facility]          ATTENDING PHYSICIAN NOTE       Date of evaluation: 2/11/2023    Chief Complaint     Shortness of Breath      History of Present Illness     Obed Lay is a 38 y.o. male history of COPD, CHF, diabetes, HTN, schizophrenia, depression presenting from facility for chest pain and trouble breathing. Patient reports that he began having sharp right-sided chest pain without radiation or exacerbating or alleviating factor on Thursday that has been 15/10 and constant since then. He reports that he then had worsening shortness of breath with intermittent cough and has been unable to lie flat. He also states that his legs have been slightly more swollen than normal.  He reports that he has been getting his Lasix as normal and has had good urine output. He reports that this feels similar to prior heart failure exacerbation. Review of Systems     Review of Systems   Unable to perform ROS: Acuity of condition   ROS limited due to patient in respiratory distress requiring BiPAP. Past Medical, Surgical, Family, and Social History     He has a past medical history of COPD (chronic obstructive pulmonary disease) ([address] Essential hypertension, Hypertensive heart disease with CHF ([address] MDD (major depressive disorder), Schizoaffective disorder ([address] Systolic CHF ([address] Transsexualism, and Type 2 diabetes mellitus ([address] He has no past surgical history on file. His family history is not on file. He reports that he has been smoking cigarettes. He has never used smokeless tobacco. He reports that he does not currently use drugs after having used the following drugs: Marijuana Christen Amble). He reports that he does not drink alcohol.     Medications     Current Discharge Medication List        CONTINUE these medications which have NOT CHANGED    Details   furosemide (LASIX) 20 MG tablet Take 20 mg by mouth daily      insulin glargine (LANTUS;BASAGLAR) 100 UNIT/ML injection pen Inject 75 Units into the skin in the morning and at bedtime      spironolactone (ALDACTONE) 50 MG tablet Take 50 mg by mouth daily      hydrOXYzine HCl (ATARAX) 25 MG tablet Take 25 mg by mouth in the morning, at noon, and at bedtime      pantoprazole sodium (PROTONIX) 40 MG PACK packet Take 40 mg by mouth every morning (before breakfast)      insulin lispro, 1 Unit Dial, (HUMALOG/ADMELOG) 100 UNIT/ML SOPN Inject 15 Units into the skin 3 times daily (with meals)  Qty: 13.5 mL, Refills: 0      atorvastatin (LIPITOR) 80 MG tablet Take 1 tablet by mouth nightly  Qty: 30 tablet, Refills: 3      aspirin 81 MG chewable tablet Take 81 mg by mouth daily      benztropine (COGENTIN) 1 MG tablet Take 1 mg by mouth 2 times daily      carvedilol (COREG) 25 MG tablet Take 50 mg by mouth 2 times daily (with meals)      vitamin B-12 (CYANOCOBALAMIN) 1000 MCG tablet Take 1,000 mcg by mouth daily      divalproex (DEPAKOTE SPRINKLE) 125 MG capsule Take 500 mg by mouth 3 times daily      fenofibrate micronized (LOFIBRA) 200 MG capsule Take 200 mg by mouth every morning (before breakfast)      ferrous gluconate (FERGON) 324 (38 Fe) MG tablet Take 324 mg by mouth daily (with breakfast)      haloperidol (HALDOL) 20 MG tablet Take 20 mg by mouth 3 times daily      hydrALAZINE (APRESOLINE) 50 MG tablet Take 50 mg by mouth 3 times daily      metFORMIN (GLUCOPHAGE) 1000 MG tablet Take 1,000 mg by mouth 2 times daily (with meals)      insulin aspart (NOVOLOG FLEXPEN) 100 UNIT/ML injection pen Inject into the skin 3 times daily (before meals) Inject per sliding scale: if 200-249 inject 4 units, if 250-299 inject 8 units, if 300-349 inject 10 units, if 350-399 inject 12 units      omeprazole (PRILOSEC) 20 MG delayed release capsule Take 20 mg by mouth daily      senna (SENOKOT) 8.6 MG tablet Take 1 tablet by mouth 2 times daily      QUEtiapine (SEROQUEL) 200 MG tablet Take 200 mg by mouth in the morning and 200 mg at noon and 200 mg in the evening and 200 mg before bedtime. Dulaglutide (TRULICITY) 9.13 LEEAS/8.8NR SOPN Inject 0.75 mg into the skin once a week On Saturdays      acetaminophen (TYLENOL) 325 MG tablet Take 975 mg by mouth every 6 hours as needed for Pain             Allergies     He has No Known Allergies. Physical Exam     INITIAL VITALS: BP: (!) 189/126, Temp: 98.1 °F (36.7 °C), Heart Rate: (!) 117, Resp: 30, SpO2: 96 %   Physical Exam  GENERAL: Awake, alert. On nasal cannula oxygen and nonrebreather with increased respiratory rate speaking in short sentences  HEENT: Normocephalic, atraumatic. Conjunctiva clear, EOMI, no eye drainage. External ear normal. Nares patent with no drainage. Mucous membranes moist. Neck is supple, with full ROM. CARDIOVASCULAR: Tachycardia, regular rhythm, extremities warm and appear well-perfused. 3+ peripheral edema in bilateral lower extremities  RESPIRATORY: Increased work of breathing with tachypnea with patient on nonrebreather nasal cannula oxygen speaking in short sentences with crackles in all lung fields with no wheezing. GI/ABDOMEN: Soft, distended, non-tender, normal bowel sounds, no rebound, no guarding. MSK/EXTR: No deformities or cyanosis noted. SKIN: Warm and dry, no rashes. NEURO: No focal neurologic deficits, moving all four extremities. Alert and oriented. Answering questions appropriately. PSYCH: Normal affect, answers questions appropriately. Diagnostic Results     EKG   EKG reviewed and interpreted by me shows sinus tachycardia with rate of 117, narrow QRS, normal axis with nonspecific ST changes and wandering baseline with no T wave inversions with poor R wave progression seen on prior EKG from 12/12/2022. RADIOLOGY:  XR CHEST PORTABLE   Final Result      Cardiomegaly with bilateral interstitial and alveolar pulmonary edema, compatible with congestive heart failure.              LABS:   Results for orders placed or performed during the hospital encounter of 02/11/23   CBC with Auto Differential   Result Value Ref Range    WBC 8.6 4.0 - 11.0 K/uL    RBC 4.74 4.20 - 5.90 M/uL    Hemoglobin 12.7 (L) 13.5 - 17.5 g/dL    Hematocrit 39.9 (L) 40.5 - 52.5 %    MCV 84.2 80.0 - 100.0 fL    MCH 26.9 26.0 - 34.0 pg    MCHC 31.9 31.0 - 36.0 g/dL    RDW 14.8 12.4 - 15.4 %    Platelets 229 826 - 491 K/uL    MPV 9.9 5.0 - 10.5 fL    Neutrophils % 65.7 %    Lymphocytes % 19.3 %    Monocytes % 14.1 %    Eosinophils % 0.1 %    Basophils % 0.8 %    Neutrophils Absolute 5.7 1.7 - 7.7 K/uL    Lymphocytes Absolute 1.7 1.0 - 5.1 K/uL    Monocytes Absolute 1.2 0.0 - 1.3 K/uL    Eosinophils Absolute 0.0 0.0 - 0.6 K/uL    Basophils Absolute 0.1 0.0 - 0.2 K/uL   CMP w/ Reflex to MG   Result Value Ref Range    Sodium 137 136 - 145 mmol/L    Potassium reflex Magnesium 3.8 3.5 - 5.1 mmol/L    Chloride 98 (L) 99 - 110 mmol/L    CO2 26 21 - 32 mmol/L    Anion Gap 13 3 - 16    Glucose 270 (H) 70 - 99 mg/dL    BUN 22 (H) 7 - 20 mg/dL    Creatinine 1.5 (H) 0.9 - 1.3 mg/dL    Est, Glom Filt Rate 60 (A) >60    Calcium 9.4 8.3 - 10.6 mg/dL    Total Protein 6.0 (L) 6.4 - 8.2 g/dL    Albumin 3.7 3.4 - 5.0 g/dL    Albumin/Globulin Ratio 1.6 1.1 - 2.2    Total Bilirubin 0.3 0.0 - 1.0 mg/dL    Alkaline Phosphatase 93 40 - 129 U/L     (H) 10 - 40 U/L    AST 42 (H) 15 - 37 U/L   Lactic Acid   Result Value Ref Range    Lactic Acid 2.1 (H) 0.4 - 2.0 mmol/L   Troponin   Result Value Ref Range    Troponin 0.08 (H) <0.01 ng/mL   Brain Natriuretic Peptide   Result Value Ref Range    Pro-BNP 3,838 (H) 0 - 124 pg/mL   Blood gas, venous (Lab)   Result Value Ref Range    pH, Lewis 7.403 7.350 - 7.450    pCO2, Lewis 46.8 41.0 - 51.0 mmHg    pO2, Lewis 44.3 (H) 25.0 - 40.0 mmHg    HCO3, Venous 29.2 (H) 24.0 - 28.0 mmol/L    Base Excess, Lewis 3.6 (H) -2.0 - 3.0 mmol/L    O2 Sat, Lewis 77 Not established %    Carboxyhemoglobin 2.1 (H) 0.0 - 1.5 %    MetHgb, Lewis 0.5 0.0 - 1.5 %    TC02 (Calc), Lewis 31 mmol/L    Hemoglobin, Lewis, Reduced 22.40 %   Troponin   Result Value Ref Range    Troponin 0.08 (H) <0.01 ng/mL   Renal Function Panel   Result Value Ref Range    Sodium 139 136 - 145 mmol/L    Potassium 4.0 3.5 - 5.1 mmol/L    Chloride 98 (L) 99 - 110 mmol/L    CO2 30 21 - 32 mmol/L    Anion Gap 11 3 - 16    Glucose 238 (H) 70 - 99 mg/dL    BUN 21 (H) 7 - 20 mg/dL    Creatinine 1.4 (H) 0.9 - 1.3 mg/dL    Est, Glom Filt Rate >60 >60    Calcium 9.3 8.3 - 10.6 mg/dL    Phosphorus 5.6 (H) 2.5 - 4.9 mg/dL    Albumin 3.6 3.4 - 5.0 g/dL   Magnesium   Result Value Ref Range    Magnesium 1.70 (L) 1.80 - 2.40 mg/dL   CBC with Auto Differential   Result Value Ref Range    WBC 6.6 4.0 - 11.0 K/uL    RBC 4.78 4.20 - 5.90 M/uL    Hemoglobin 12.7 (L) 13.5 - 17.5 g/dL    Hematocrit 40.6 40.5 - 52.5 %    MCV 84.9 80.0 - 100.0 fL    MCH 26.6 26.0 - 34.0 pg    MCHC 31.4 31.0 - 36.0 g/dL    RDW 15.0 12.4 - 15.4 %    Platelets 607 748 - 929 K/uL    MPV 9.9 5.0 - 10.5 fL    Neutrophils % 61.3 %    Lymphocytes % 23.8 %    Monocytes % 14.1 %    Eosinophils % 0.1 %    Basophils % 0.7 %    Neutrophils Absolute 4.0 1.7 - 7.7 K/uL    Lymphocytes Absolute 1.6 1.0 - 5.1 K/uL    Monocytes Absolute 0.9 0.0 - 1.3 K/uL    Eosinophils Absolute 0.0 0.0 - 0.6 K/uL    Basophils Absolute 0.0 0.0 - 0.2 K/uL   Troponin   Result Value Ref Range    Troponin 0.07 (H) <0.01 ng/mL   Troponin   Result Value Ref Range    Troponin 0.07 (H) <0.01 ng/mL   Lactic Acid   Result Value Ref Range    Lactic Acid 2.8 (H) 0.4 - 2.0 mmol/L   Magnesium   Result Value Ref Range    Magnesium 1.60 (L) 1.80 - 2.40 mg/dL   Urine Drug Screen   Result Value Ref Range    Amphetamine Screen, Urine Neg Negative <1000ng/mL    Barbiturate Screen, Ur Neg Negative <200 ng/mL    Benzodiazepine Screen, Urine Neg Negative <200 ng/mL    Cannabinoid Scrn, Ur Neg Negative <50 ng/mL    Cocaine Metabolite Screen, Urine Neg Negative <300 ng/mL    Opiate Scrn, Ur Neg Negative <300 ng/mL    PCP Screen, Urine Neg Negative <25 ng/mL    Methadone Screen, Urine Neg Negative <300 ng/mL    Oxycodone Urine Neg Negative <100 ng/ml    FENTANYL SCREEN, URINE Neg Negative <5 ng/mL    pH, UA 6.0     Drug Screen Comment: see below    Lactic Acid   Result Value Ref Range    Lactic Acid 1.6 0.4 - 2.0 mmol/L   TSH with Reflex   Result Value Ref Range    TSH 0.37 0.27 - 4.20 uIU/mL   POCT Glucose   Result Value Ref Range    POC Glucose 195 (H) 70 - 99 mg/dl    Performed on ACCU-CHEK    POCT Glucose   Result Value Ref Range    POC Glucose 203 (H) 70 - 99 mg/dl    Performed on ACCU-CHEK    POCT Glucose   Result Value Ref Range    POC Glucose 278 (H) 70 - 99 mg/dl    Performed on ACCU-CHEK    POCT Glucose   Result Value Ref Range    POC Glucose 189 (H) 70 - 99 mg/dl    Performed on ACCU-CHEK    EKG 12 Lead   Result Value Ref Range    Ventricular Rate 117 BPM    Atrial Rate 117 BPM    P-R Interval 140 ms    QRS Duration 88 ms    Q-T Interval 352 ms    QTc Calculation (Bazett) 491 ms    P Axis 64 degrees    R Axis 21 degrees    T Axis 111 degrees    Diagnosis       EKG performed in ER and to be interpreted by ER physician. Confirmed by MD, ER ([id number]),  [address] ([phone]) on 2/12/2023 8:06:12 AM       ED BEDSIDE ULTRASOUND:  No results found. RECENT VITALS:  BP: (!) 160/108,Temp: 98 °F (36.7 °C), Heart Rate: (!) 105, Resp: 26, SpO2: 98 %     Procedures         ED Course     Nursing Notes, Past Medical Hx, Past Surgical Hx, Social Hx,Allergies, and Family Hx were reviewed.     ED Course as of 02/12/23 1924   Sat Feb 11, 2023 1907 Troponin(!): 0.08 [ED]   1907 Pro-BNP(!): 3,838 [ED]   1907 Creatinine(!): 1.5 [ED]   1907 Lactic Acid(!): 2.1 [ED]   1907 pCO2, Lewis: 46.8 [ED]   1907 pH, Lewis: 7.403 [ED]   1907 Potassium: 3.8 [ED]      ED Course User Index  [ED] Florencio Glass MD       patient was given the following medications:  Orders Placed This Encounter   Medications    nitroglycerin (NITRO-BID) 2 % ointment 0.5 inch    furosemide (LASIX) injection 40 mg    aspirin chewable tablet 81 mg    atorvastatin (LIPITOR) tablet 80 mg    carvedilol (COREG) tablet 50 mg    furosemide (LASIX) injection 40 mg    hydrALAZINE (APRESOLINE) tablet 25 mg    insulin glargine (LANTUS;BASAGLAR) injection pen 65 Units    QUEtiapine (SEROQUEL) tablet 200 mg    spironolactone (ALDACTONE) tablet 50 mg    divalproex (DEPAKOTE SPRINKLE) DR capsule 500 mg    insulin lispro (1 Unit Dial) (HUMALOG/ADMELOG) pen 0-16 Units    insulin lispro (1 Unit Dial) (HUMALOG/ADMELOG) pen 0-4 Units    labetalol (NORMODYNE;TRANDATE) injection 5 mg    hydrALAZINE (APRESOLINE) injection 5 mg    sodium chloride flush 0.9 % injection 5-40 mL    sodium chloride flush 0.9 % injection 5-40 mL    0.9 % sodium chloride infusion    OR Linked Order Group     ondansetron (ZOFRAN-ODT) disintegrating tablet 4 mg     ondansetron (ZOFRAN) injection 4 mg    polyethylene glycol (GLYCOLAX) packet 17 g    OR Linked Order Group     acetaminophen (TYLENOL) tablet 650 mg     acetaminophen (TYLENOL) suppository 650 mg    potassium bicarb-citric acid (EFFER-K) effervescent tablet 40 mEq    heparin (porcine) injection 5,000 Units    glucose chewable tablet 16 g    OR Linked Order Group     dextrose bolus 10% 125 mL     dextrose bolus 10% 250 mL    glucagon injection 1 mg    dextrose 10 % infusion    carvedilol (COREG) tablet 6.25 mg    perflutren lipid microspheres (DEFINITY) injection 1.5 mL    glucose chewable tablet 16 g    OR Linked Order Group     dextrose bolus 10% 125 mL     dextrose bolus 10% 250 mL    glucagon injection 1 mg    dextrose 10 % infusion    DISCONTD: potassium bicarb-citric acid (EFFER-K) effervescent tablet 20 mEq    magnesium sulfate 2000 mg in 50 mL IVPB premix       CONSULTS:  IP CONSULT TO HOSPITALIST  IP CONSULT TO PHARMACY  IP CONSULT TO [address] [place] [address] DECISIONMAKING / ASSESSMENT / Kacey Shannon is a 38 y.o. male with history of COPD and CHF presenting from facility for worsening chest pain and trouble breathing since Thursday having on nasal cannula and nonrebreather oxygen. Patient is tachypneic and tachycardic with crackles throughout lung fields on oxygen and BiPAP was ordered on arrival.  Patient's oxygen saturation on oxygen in the mid to high 90s. Patient alert and oriented. Differential concerning for but limited to CHF exacerbation, COPD exacerbation, arrhythmia, electrolyte abnormality, hypertensive emergency, flash pulmonary edema. Patient had received full dose aspirin and a dose of nitro with EMS with no improvement of symptoms. Patient ordered Nitropaste for hypertension shortness of breath with hypoxia and crackles on exam.  Labs with elevated troponin at 0.08 with elevated proBNP greater than 3000. Elevated lactate at 2.1. No leukocytosis. Normal pH and PCO2. Patient with slight VAHE with creatinine of 1.5 from baseline of 1.2. Potassium of 3.8. Patient given dose of IV Lasix. On repeat assessment, patient stated that his breathing felt much better and that the mask was helping. His blood pressure improved from the high 842Y to 182O systolic. Patient was admitted to medicine service for further evaluation and management of CHF exacerbation and acute hypoxic respiratory failure requiring BiPAP. Patient care was signed out to inpatient team.      Medical Decision Making  Problems Addressed:  Acute respiratory failure with hypoxia [facility]): acute illness or injury that poses a threat to life or bodily functions  NSTEMI (non-ST elevated myocardial infarction) [facility]): acute illness or injury with systemic symptoms    Amount and/or Complexity of Data Reviewed  Independent Historian: EMS  Labs: ordered. Decision-making details documented in ED Course. Radiology: ordered. ECG/medicine tests: ordered and independent interpretation performed. Risk  Prescription drug management. Decision regarding hospitalization.     Critical Care  Total time providing critical care: 30-74 minutes      Critical Care:  Due to the immediate potential for life-threatening deterioration due to acute hypoxic respiratory failure with CHF exacerbation and volume overload, I spent 31 minutes providing critical care. This time excludes time spent performing procedures but includes time spent on direct patient care, history retrieval, review of the chart, and discussions with patient, family, and consultant(s). Clinical Impression     1. Acute on chronic congestive heart failure, unspecified heart failure type ([facility]    2. NSTEMI (non-ST elevated myocardial infarction) ([facility]    3. Acute respiratory failure with hypoxia (HCC)        Disposition     PATIENT REFERRED TO:  No follow-up provider specified.     DISCHARGE MEDICATIONS:  Current Discharge Medication List          DISPOSITION Admitted 02/11/2023 09:19:28 PM         Lord Rona MD  02/12/23 [id number]

## 2023-02-12 PROBLEM — N17.9 ACUTE KIDNEY INJURY SUPERIMPOSED ON CKD (HCC): Status: ACTIVE | Noted: 2023-02-12

## 2023-02-12 PROBLEM — E66.01 MORBID OBESITY (HCC): Status: ACTIVE | Noted: 2022-11-21

## 2023-02-12 PROBLEM — I10 ESSENTIAL HYPERTENSION: Status: ACTIVE | Noted: 2023-02-12

## 2023-02-12 PROBLEM — N18.9 ACUTE KIDNEY INJURY SUPERIMPOSED ON CKD (HCC): Status: ACTIVE | Noted: 2023-02-12

## 2023-02-12 PROBLEM — I50.9 ACUTE ON CHRONIC CONGESTIVE HEART FAILURE (HCC): Status: ACTIVE | Noted: 2023-02-12

## 2023-02-12 LAB
ALBUMIN SERPL-MCNC: 3.6 G/DL (ref 3.4–5)
AMPHETAMINE SCREEN, URINE: NORMAL
ANION GAP SERPL CALCULATED.3IONS-SCNC: 11 MMOL/L (ref 3–16)
BARBITURATE SCREEN URINE: NORMAL
BASOPHILS ABSOLUTE: 0 K/UL (ref 0–0.2)
BASOPHILS RELATIVE PERCENT: 0.7 %
BENZODIAZEPINE SCREEN, URINE: NORMAL
BUN BLDV-MCNC: 21 MG/DL (ref 7–20)
CALCIUM SERPL-MCNC: 9.3 MG/DL (ref 8.3–10.6)
CANNABINOID SCREEN URINE: NORMAL
CHLORIDE BLD-SCNC: 98 MMOL/L (ref 99–110)
CO2: 30 MMOL/L (ref 21–32)
COCAINE METABOLITE SCREEN URINE: NORMAL
CREAT SERPL-MCNC: 1.4 MG/DL (ref 0.9–1.3)
EKG ATRIAL RATE: 117 BPM
EKG DIAGNOSIS: NORMAL
EKG P AXIS: 64 DEGREES
EKG P-R INTERVAL: 140 MS
EKG Q-T INTERVAL: 352 MS
EKG QRS DURATION: 88 MS
EKG QTC CALCULATION (BAZETT): 491 MS
EKG R AXIS: 21 DEGREES
EKG T AXIS: 111 DEGREES
EKG VENTRICULAR RATE: 117 BPM
EOSINOPHILS ABSOLUTE: 0 K/UL (ref 0–0.6)
EOSINOPHILS RELATIVE PERCENT: 0.1 %
FENTANYL SCREEN, URINE: NORMAL
GFR SERPL CREATININE-BSD FRML MDRD: >60 ML/MIN/{1.73_M2}
GLUCOSE BLD-MCNC: 189 MG/DL (ref 70–99)
GLUCOSE BLD-MCNC: 195 MG/DL (ref 70–99)
GLUCOSE BLD-MCNC: 203 MG/DL (ref 70–99)
GLUCOSE BLD-MCNC: 238 MG/DL (ref 70–99)
GLUCOSE BLD-MCNC: 278 MG/DL (ref 70–99)
GLUCOSE BLD-MCNC: 420 MG/DL (ref 70–99)
HCT VFR BLD CALC: 40.6 % (ref 40.5–52.5)
HEMOGLOBIN: 12.7 G/DL (ref 13.5–17.5)
LACTIC ACID: 1.6 MMOL/L (ref 0.4–2)
LACTIC ACID: 2.8 MMOL/L (ref 0.4–2)
LYMPHOCYTES ABSOLUTE: 1.6 K/UL (ref 1–5.1)
LYMPHOCYTES RELATIVE PERCENT: 23.8 %
Lab: NORMAL
MAGNESIUM: 1.6 MG/DL (ref 1.8–2.4)
MAGNESIUM: 1.7 MG/DL (ref 1.8–2.4)
MCH RBC QN AUTO: 26.6 PG (ref 26–34)
MCHC RBC AUTO-ENTMCNC: 31.4 G/DL (ref 31–36)
MCV RBC AUTO: 84.9 FL (ref 80–100)
METHADONE SCREEN, URINE: NORMAL
MONOCYTES ABSOLUTE: 0.9 K/UL (ref 0–1.3)
MONOCYTES RELATIVE PERCENT: 14.1 %
NEUTROPHILS ABSOLUTE: 4 K/UL (ref 1.7–7.7)
NEUTROPHILS RELATIVE PERCENT: 61.3 %
OPIATE SCREEN URINE: NORMAL
OXYCODONE URINE: NORMAL
PDW BLD-RTO: 15 % (ref 12.4–15.4)
PERFORMED ON: ABNORMAL
PH UA: 6
PHENCYCLIDINE SCREEN URINE: NORMAL
PHOSPHORUS: 5.6 MG/DL (ref 2.5–4.9)
PLATELET # BLD: 177 K/UL (ref 135–450)
PMV BLD AUTO: 9.9 FL (ref 5–10.5)
POTASSIUM SERPL-SCNC: 4 MMOL/L (ref 3.5–5.1)
RBC # BLD: 4.78 M/UL (ref 4.2–5.9)
SODIUM BLD-SCNC: 139 MMOL/L (ref 136–145)
TROPONIN: 0.07 NG/ML
TROPONIN: 0.07 NG/ML
TSH REFLEX: 0.37 UIU/ML (ref 0.27–4.2)
WBC # BLD: 6.6 K/UL (ref 4–11)

## 2023-02-12 PROCEDURE — 2580000003 HC RX 258: Performed by: STUDENT IN AN ORGANIZED HEALTH CARE EDUCATION/TRAINING PROGRAM

## 2023-02-12 PROCEDURE — 6370000000 HC RX 637 (ALT 250 FOR IP): Performed by: INTERNAL MEDICINE

## 2023-02-12 PROCEDURE — 94660 CPAP INITIATION&MGMT: CPT

## 2023-02-12 PROCEDURE — 94761 N-INVAS EAR/PLS OXIMETRY MLT: CPT

## 2023-02-12 PROCEDURE — 99222 1ST HOSP IP/OBS MODERATE 55: CPT | Performed by: INTERNAL MEDICINE

## 2023-02-12 PROCEDURE — 83735 ASSAY OF MAGNESIUM: CPT

## 2023-02-12 PROCEDURE — 6360000002 HC RX W HCPCS

## 2023-02-12 PROCEDURE — 80069 RENAL FUNCTION PANEL: CPT

## 2023-02-12 PROCEDURE — 85025 COMPLETE CBC W/AUTO DIFF WBC: CPT

## 2023-02-12 PROCEDURE — 2060000000 HC ICU INTERMEDIATE R&B

## 2023-02-12 PROCEDURE — 6360000002 HC RX W HCPCS: Performed by: STUDENT IN AN ORGANIZED HEALTH CARE EDUCATION/TRAINING PROGRAM

## 2023-02-12 PROCEDURE — 84443 ASSAY THYROID STIM HORMONE: CPT

## 2023-02-12 PROCEDURE — 80307 DRUG TEST PRSMV CHEM ANLYZR: CPT

## 2023-02-12 PROCEDURE — 2700000000 HC OXYGEN THERAPY PER DAY

## 2023-02-12 PROCEDURE — 84484 ASSAY OF TROPONIN QUANT: CPT

## 2023-02-12 PROCEDURE — 36415 COLL VENOUS BLD VENIPUNCTURE: CPT

## 2023-02-12 PROCEDURE — 6370000000 HC RX 637 (ALT 250 FOR IP): Performed by: STUDENT IN AN ORGANIZED HEALTH CARE EDUCATION/TRAINING PROGRAM

## 2023-02-12 PROCEDURE — 83605 ASSAY OF LACTIC ACID: CPT

## 2023-02-12 RX ORDER — CARVEDILOL 6.25 MG/1
6.25 TABLET ORAL ONCE
Status: COMPLETED | OUTPATIENT
Start: 2023-02-12 | End: 2023-02-12

## 2023-02-12 RX ORDER — DEXTROSE MONOHYDRATE 100 MG/ML
INJECTION, SOLUTION INTRAVENOUS CONTINUOUS PRN
Status: DISCONTINUED | OUTPATIENT
Start: 2023-02-12 | End: 2023-02-15 | Stop reason: HOSPADM

## 2023-02-12 RX ORDER — MAGNESIUM SULFATE IN WATER 40 MG/ML
2000 INJECTION, SOLUTION INTRAVENOUS ONCE
Status: COMPLETED | OUTPATIENT
Start: 2023-02-12 | End: 2023-02-12

## 2023-02-12 RX ORDER — GLUCAGON 1 MG/ML
1 KIT INJECTION PRN
Status: DISCONTINUED | OUTPATIENT
Start: 2023-02-12 | End: 2023-02-15 | Stop reason: HOSPADM

## 2023-02-12 RX ORDER — SPIRONOLACTONE 50 MG/1
50 TABLET, FILM COATED ORAL DAILY
COMMUNITY

## 2023-02-12 RX ORDER — FUROSEMIDE 20 MG/1
20 TABLET ORAL DAILY
Status: ON HOLD | COMMUNITY
End: 2023-02-15 | Stop reason: SDUPTHER

## 2023-02-12 RX ORDER — HYDROXYZINE HYDROCHLORIDE 25 MG/1
25 TABLET, FILM COATED ORAL 3 TIMES DAILY
COMMUNITY

## 2023-02-12 RX ADMIN — CARVEDILOL 50 MG: 25 TABLET, FILM COATED ORAL at 17:51

## 2023-02-12 RX ADMIN — ASPIRIN 81 MG 81 MG: 81 TABLET ORAL at 09:07

## 2023-02-12 RX ADMIN — SODIUM CHLORIDE, PRESERVATIVE FREE 10 ML: 5 INJECTION INTRAVENOUS at 09:08

## 2023-02-12 RX ADMIN — HEPARIN SODIUM 5000 UNITS: 5000 INJECTION INTRAVENOUS; SUBCUTANEOUS at 21:34

## 2023-02-12 RX ADMIN — HYDRALAZINE HYDROCHLORIDE 25 MG: 25 TABLET, FILM COATED ORAL at 21:34

## 2023-02-12 RX ADMIN — INSULIN GLARGINE 65 UNITS: 100 INJECTION, SOLUTION SUBCUTANEOUS at 23:45

## 2023-02-12 RX ADMIN — INSULIN LISPRO 4 UNITS: 100 INJECTION, SOLUTION INTRAVENOUS; SUBCUTANEOUS at 09:43

## 2023-02-12 RX ADMIN — INSULIN LISPRO 8 UNITS: 100 INJECTION, SOLUTION INTRAVENOUS; SUBCUTANEOUS at 12:21

## 2023-02-12 RX ADMIN — CARVEDILOL 6.25 MG: 6.25 TABLET, FILM COATED ORAL at 04:17

## 2023-02-12 RX ADMIN — QUETIAPINE FUMARATE 200 MG: 200 TABLET ORAL at 13:45

## 2023-02-12 RX ADMIN — FUROSEMIDE 40 MG: 10 INJECTION, SOLUTION INTRAMUSCULAR; INTRAVENOUS at 09:08

## 2023-02-12 RX ADMIN — INSULIN GLARGINE 65 UNITS: 100 INJECTION, SOLUTION SUBCUTANEOUS at 09:10

## 2023-02-12 RX ADMIN — QUETIAPINE FUMARATE 200 MG: 200 TABLET ORAL at 21:34

## 2023-02-12 RX ADMIN — CARVEDILOL 50 MG: 25 TABLET, FILM COATED ORAL at 09:12

## 2023-02-12 RX ADMIN — DIVALPROEX SODIUM 500 MG: 125 CAPSULE ORAL at 21:33

## 2023-02-12 RX ADMIN — ACETAMINOPHEN 650 MG: 325 TABLET ORAL at 21:34

## 2023-02-12 RX ADMIN — HEPARIN SODIUM 5000 UNITS: 5000 INJECTION INTRAVENOUS; SUBCUTANEOUS at 13:45

## 2023-02-12 RX ADMIN — HEPARIN SODIUM 5000 UNITS: 5000 INJECTION INTRAVENOUS; SUBCUTANEOUS at 04:18

## 2023-02-12 RX ADMIN — HYDRALAZINE HYDROCHLORIDE 25 MG: 25 TABLET, FILM COATED ORAL at 13:45

## 2023-02-12 RX ADMIN — DIVALPROEX SODIUM 500 MG: 125 CAPSULE ORAL at 00:00

## 2023-02-12 RX ADMIN — QUETIAPINE FUMARATE 200 MG: 200 TABLET ORAL at 17:51

## 2023-02-12 RX ADMIN — HYDRALAZINE HYDROCHLORIDE 25 MG: 25 TABLET, FILM COATED ORAL at 09:07

## 2023-02-12 RX ADMIN — ATORVASTATIN CALCIUM 80 MG: 80 TABLET, FILM COATED ORAL at 21:33

## 2023-02-12 RX ADMIN — ACETAMINOPHEN 650 MG: 325 TABLET ORAL at 12:19

## 2023-02-12 RX ADMIN — MAGNESIUM SULFATE HEPTAHYDRATE 2000 MG: 40 INJECTION, SOLUTION INTRAVENOUS at 09:18

## 2023-02-12 RX ADMIN — SODIUM CHLORIDE, PRESERVATIVE FREE 10 ML: 5 INJECTION INTRAVENOUS at 21:34

## 2023-02-12 RX ADMIN — FUROSEMIDE 40 MG: 10 INJECTION, SOLUTION INTRAMUSCULAR; INTRAVENOUS at 17:50

## 2023-02-12 RX ADMIN — INSULIN LISPRO 4 UNITS: 100 INJECTION, SOLUTION INTRAVENOUS; SUBCUTANEOUS at 23:40

## 2023-02-12 RX ADMIN — SPIRONOLACTONE 50 MG: 50 TABLET ORAL at 09:07

## 2023-02-12 RX ADMIN — QUETIAPINE FUMARATE 200 MG: 200 TABLET ORAL at 09:08

## 2023-02-12 RX ADMIN — DIVALPROEX SODIUM 500 MG: 125 CAPSULE ORAL at 13:45

## 2023-02-12 RX ADMIN — DIVALPROEX SODIUM 500 MG: 125 CAPSULE ORAL at 09:07

## 2023-02-12 ASSESSMENT — PAIN SCALES - WONG BAKER

## 2023-02-12 ASSESSMENT — PAIN DESCRIPTION - LOCATION
LOCATION: HEAD
LOCATION: NECK

## 2023-02-12 ASSESSMENT — PAIN SCALES - GENERAL
PAINLEVEL_OUTOF10: 3
PAINLEVEL_OUTOF10: 0

## 2023-02-12 ASSESSMENT — PAIN DESCRIPTION - ORIENTATION: ORIENTATION: ANTERIOR

## 2023-02-12 ASSESSMENT — PAIN DESCRIPTION - DESCRIPTORS: DESCRIPTORS: ACHING;DULL

## 2023-02-12 NOTE — ED NOTES
ED TO INPATIENT SBAR HANDOFF    Patient Name: Brenda Snyder   :  1980  38 y.o. MRN:  [id number]  Preferred Name  n/a  ED Room #:  W33/F69-10  Family/Caregiver Present no   Restraints no   Sitter no   Sepsis Risk Score Sepsis Risk Score: 2.48    Situation  Code Status:full code. Allergies: Patient has no known allergies. Weight: Patient Vitals for the past 96 hrs (Last 3 readings):   Weight   23 1813 (!) 340 lb (154.2 kg)     Arrived from: nursing home  Chief Complaint:   Chief Complaint   Patient presents with    Shortness of [place] Problem/Diagnosis:  Principal Problem:    Acute on chronic respiratory failure with hypoxemia ([facility]  Resolved Problems:    * No resolved hospital problems.  *    Imaging:   XR CHEST PORTABLE    (Results Pending)     Abnormal labs:   Abnormal Labs Reviewed   CBC WITH AUTO DIFFERENTIAL - Abnormal; Notable for the following components:       Result Value    Hemoglobin 12.7 (*)     Hematocrit 39.9 (*)     All other components within normal limits   COMPREHENSIVE METABOLIC PANEL W/ REFLEX TO MG FOR LOW K - Abnormal; Notable for the following components:    Chloride 98 (*)     Glucose 270 (*)     BUN 22 (*)     Creatinine 1.5 (*)     Est, Glom Filt Rate 60 (*)     Total Protein 6.0 (*)      (*)     AST 42 (*)     All other components within normal limits   LACTIC ACID - Abnormal; Notable for the following components:    Lactic Acid 2.1 (*)     All other components within normal limits   TROPONIN - Abnormal; Notable for the following components:    Troponin 0.08 (*)     All other components within normal limits   BRAIN NATRIURETIC PEPTIDE - Abnormal; Notable for the following components:    Pro-BNP 3,838 (*)     All other components within normal limits   BLOOD GAS, VENOUS - Abnormal; Notable for the following components:    pO2, Lewis 44.3 (*)     HCO3, Venous 29.2 (*)     Base Excess, Lewis 3.6 (*)     Carboxyhemoglobin 2.1 (*)     All other components within normal limits     Critical values: no     Abnormal Assessment Findings: n/a    Background  History:   Past Medical History:   Diagnosis Date    COPD (chronic obstructive pulmonary disease) (HCC)     Essential hypertension     Hypertensive heart disease with CHF ([facility]     MDD (major depressive disorder)     Schizoaffective disorder (HCC)     Systolic CHF ([facility]     Transsexualism     Type 2 diabetes mellitus ([facility]        Assessment    Vitals/MEWS: MEWS Score: 5  Level of Consciousness: Alert (0)   Vitals:    02/11/23 1900 02/11/23 1930 02/11/23 2000 02/11/23 2001   BP: (!) 163/111 (!) 160/111 (!) 171/131    Pulse: (!) 115 (!) 113 (!) 113 (!) 113   Resp: 28 22 (!) 34 27   Temp:       TempSrc:       SpO2: 100%  99% (!) 60%   Weight:       Height:         FiO2 (%): 60%  O2 Flow Rate: O2 Device: PAP (positive airway pressure)    Cardiac Rhythm: Cardiac Rhythm: Sinus tachy  Pain Assessment:  [x] Verbal [] [place] Scale  Pain Scale:    Last documented pain score (0-10 scale)    Last documented pain medication administered: n/a  Mental Status: a/o times 4  NIH Score:    C-SSRS: Risk of Suicide: No Risk  Bedside swallow:    Rene Coma Scale (GCS): [place] Coma Scale  Eye Opening: Spontaneous  Best Verbal Response: Oriented  Best Motor Response: Obeys commands  Rene Coma Scale Score: 15  Active LDA's:   Peripheral IV 02/11/23 Right Antecubital (Active)   Site Assessment Clean, dry & intact; Clean;Dry 02/11/23 1818   Line Status Blood return noted;Brisk blood return;Flushed;Normal saline locked;Specimen collected 02/11/23 1818     PO Status: Regular  Pertinent or High Risk Medications/Drips: no   o If Yes, please provide details: n/a  Pending Blood Product Administration: no     You may also review the ED PT Care Timeline found under the Summary Nursing Index tab. Recommendation    Pending orders ED orders complete  Plan for Discharge (if known):    Additional Comments:   If any further questions, please call Sending RN at [phone]    Electronically signed by: Electronically signed by Irina Rodriguez RN on 2/11/2023 at 8:25 PM     Irina Rodriguez RN  02/11/23 2031

## 2023-02-12 NOTE — H&P
Internal Medicine  PGY 1  History & Physical      CC SOB    History Obtained From:  patient    HISTORY OF PRESENT ILLNESS:  Ronnell Merlin 39 yo M w/ phmx of HTN, HFpEF (50%, 2022), cognitive impairment, schizoaffective disorder, T2DM who presents to the ED with complaints of chest pain and SOB for the last 2 days. He denies nausea, vomiting, or change in bowel habits. Pt states that his legs are more swollen than normal and is short of breath even when laying down. Pt comes from [address], and just stated its for health related reasons. Pt states that starting on late Thursday night he began to feel short of breath after eating [place] food for dinner. He woke up Friday and felt more short of breath than before and felt like his weight was increasing and legs were becoming more swollen. He has a hx of CHF and currently takes coreg, aldactone, and lasix. His last ECHO was in 11/2022 which showed an EF of 50% with mild concentric left ventricular hypertrophy. Pt follows with Dr. Devon Fu. PE was ruled out since pt came in with chest pain but improved when placed on BiPAP and states that he does not have it any more when we saw him in the ED. Labs:   Cr (1.5), BNP (3863), troponin (0.08 x2), WBC wnl,   CXR: Cardiomegaly with bilateral interstitial and alveolar pulmonary edema, compatible with congestive heart failure. Past Medical History:        Diagnosis Date    COPD (chronic obstructive pulmonary disease) ([address]     Essential hypertension     Hypertensive heart disease with CHF ([address]     MDD (major depressive disorder)     Schizoaffective disorder (HCC)     Systolic CHF ([address]     Transsexualism     Type 2 diabetes mellitus ([address]        Past Surgical History:    No past surgical history on file. Medications Priorto Admission:    Not in a hospital admission. Allergies:  Patient has no known allergies. Social History:   TOBACCO:   reports that he has been smoking cigarettes.  He has never used smokeless tobacco.  ETOH:   reports no history of alcohol use. DRUGS : None  Patient currently lives in a nursing home    Family History:   No family history on file. Review of Systems   Constitutional: Negative. HENT: Negative. Respiratory:  Positive for shortness of breath. Cardiovascular:  Positive for chest pain. Gastrointestinal: Negative. Genitourinary: Negative. Musculoskeletal: Negative. Neurological: Negative. Psychiatric/Behavioral: Negative. ROS: A 10 point review of systems was conducted, significant findings as noted in HPI. Physical Exam  Vitals and nursing note reviewed. Constitutional:       General: He is in acute distress. Appearance: He is toxic-appearing. HENT:      Head: Normocephalic and atraumatic. Eyes:      Pupils: Pupils are equal, round, and reactive to light. Cardiovascular:      Rate and Rhythm: Regular rhythm. Tachycardia present. Pulses: Normal pulses. Heart sounds: Normal heart sounds. Pulmonary:      Effort: Respiratory distress present. Breath sounds: Rhonchi present. Abdominal:      General: Abdomen is flat. Bowel sounds are normal.      Palpations: Abdomen is soft. Skin:     General: Skin is warm and dry. Neurological:      General: No focal deficit present. Mental Status: He is alert. Psychiatric:         Thought Content:  Thought content normal.     Physical exam:       Vitals:    02/11/23 2001   BP:    Pulse: (!) 113   Resp: 27   Temp:    SpO2: (!) 60%       DATA:    Labs:  CBC:   Recent Labs     02/11/23  1823   WBC 8.6   HGB 12.7*   HCT 39.9*          BMP:   Recent Labs     02/11/23  1823      K 3.8   CL 98*   CO2 26   BUN 22*   CREATININE 1.5*   GLUCOSE 270*     LFT's:   Recent Labs     02/11/23  1823   AST 42*   *   BILITOT 0.3   ALKPHOS 93     Troponin:   Recent Labs     02/11/23  1823   TROPONINI 0.08*     BNP:No results for input(s): BNP in the last 72 hours.  ABGs: No results for input(s): PHART, [id number], PO2ART in the last 72 hours. INR: No results for input(s): INR in the last 72 hours. U/A:No results for input(s): NITRITE, COLORU, PHUR, LABCAST, WBCUA, RBCUA, MUCUS, TRICHOMONAS, YEAST, BACTERIA, CLARITYU, SPECGRAV, LEUKOCYTESUR, UROBILINOGEN, BILIRUBINUR, BLOODU, GLUCOSEU, AMORPHOUS in the last 72 hours. Invalid input(s): KETONESU    XR CHEST PORTABLE   Final Result      Cardiomegaly with bilateral interstitial and alveolar pulmonary edema, compatible with congestive heart failure. ASSESSMENT AND PLAN:  Hina Fields 37 yo M w/ phmx of HTN, HFpEF (50%, 2022), cognitive impairment, schizoaffective disorder, T2DM who presents to the ED with complaints of chest pain and SOB for the last 2 days. He denies nausea, vomiting, or change in bowel habits. Pt states that his legs are more swollen than normal and is short of breath even when laying down. Pt comes from [address], and just stated its for health related reasons. HFpEF - in exacerbation  Pt has been short of breath for the past 2 days and it is likely that he has been non-compliant with his meds and taking in a high sodium diet. - Daily CBC, BMP  - strict I/O  - Daily weights  - Watch K+, Mg2+ (>4, >2)  - IV Lasix 40mg BID  - home aldactone  - home coreg  - UDS pending  - ECHO pending    Troponemia 2/2 NSTEMI II  - follow up next troponin  - 0.08 x2  - Wells score 1.5    HTN 2/2 possible Cushings   Pt is a young male with HTN on multipe BP meds.  He has moon facies with labs significant for elevated cortisol levels  - Pt has elevated cortisol level with non-supressible dexamethasone test  - Needs outpt work up   - home BP meds    VAHE  Pt baseline Cr is 1.1 and is elevated to 1.5 today likely due to increased water retention  - will improve with diuresis    HLD  - home meds     Will discuss with attending physician Dr. Kei Lazo MD    Code Status:Full code  FEN: Low salt Diet  PPX: Heparin  DISPO: SOFÍA Pradhan DO  2/11/2023,  8:56 PM

## 2023-02-12 NOTE — CONSULTS
Clinical Pharmacy Progress Note  Medication History      Asked to verify home medications by Dr. Jamie Jeffers. List of of current medications patient is taking is complete. Home Medication list in EPIC updated to reflect changes noted below.      Source of information: [facility]:   Medications removed (no longer taking):  None     Medications added:   Hydroxyzine     Medication doses / instructions adjusted:   Furosemide - takes 20mg daily  Lantus - takes 75 units BID  Metformin - takes 1000mg BID  Spironolactone - takes 50mg daily     Other notes:   Pt has both Omeprazole AND Pantoprazole as active orders at his NH     Please call with questions--  Thanks--  Jose Luke, PharmD, BCPS, BCGP  [id number] ([facility])   2/12/2023 7:24 AM      Current Outpatient Medications   Medication Instructions    acetaminophen (TYLENOL) 975 mg, Oral, EVERY 6 HOURS PRN    aspirin 81 mg, Oral, DAILY    atorvastatin (LIPITOR) 80 mg, Oral, NIGHTLY    benztropine (COGENTIN) 1 mg, Oral, 2 TIMES DAILY    carvedilol (COREG) 50 mg, Oral, 2 TIMES DAILY WITH MEALS    divalproex (DEPAKOTE SPRINKLE) 500 mg, Oral, 3 TIMES DAILY    fenofibrate micronized (LOFIBRA) 200 mg, Oral, DAILY BEFORE BREAKFAST    ferrous gluconate (FERGON) 324 mg, Oral, DAILY WITH BREAKFAST    furosemide (LASIX) 20 mg, Oral, DAILY    haloperidol (HALDOL) 20 mg, Oral, 3 TIMES DAILY    hydrALAZINE (APRESOLINE) 50 mg, Oral, 3 TIMES DAILY    hydrOXYzine HCl (ATARAX) 25 mg, Oral, 3 times daily    insulin aspart (NOVOLOG FLEXPEN) 100 UNIT/ML injection pen SubCUTAneous, 3 TIMES DAILY BEFORE MEALS, Inject per sliding scale: if 200-249 inject 4 units, if 250-299 inject 8 units, if 300-349 inject 10 units, if 350-399 inject 12 units    insulin glargine (LANTUS;BASAGLAR) 75 Units, SubCUTAneous, 2 times daily    insulin lispro (1 Unit Dial) (HUMALOG/ADMELOG) 15 Units, SubCUTAneous, 3 TIMES DAILY WITH MEALS metFORMIN (GLUCOPHAGE) 1,000 mg, Oral, 2 TIMES DAILY WITH MEALS    omeprazole (PRILOSEC) 20 mg, Oral, DAILY    pantoprazole sodium (PROTONIX) 40 mg, Oral, DAILY BEFORE BREAKFAST    QUEtiapine (SEROQUEL) 200 mg, Oral, EVERY 6 HOURS    senna (SENOKOT) 8.6 MG tablet 1 tablet, Oral, 2 TIMES DAILY    spironolactone (ALDACTONE) 50 mg, Oral, DAILY    Trulicity 7.95 mg, SubCUTAneous, WEEKLY, On Saturdays    vitamin B-12 (CYANOCOBALAMIN) 1,000 mcg, Oral, DAILY

## 2023-02-12 NOTE — DISCHARGE INSTRUCTIONS
-Please follow-up with your PCP  -Please follow-up with cardiology Dr Stacy Gomez within 1 week and complete blood work prior to your visit  -Please follow-up with endocrinology Dr Devora Ruiz within 1 week  -Please take 40mg of Lasix twice daily  -Please take all other medications as prescribed    Extra Heart Failure sites:   [email or web address][email or web address]   --- this is American Heart Association interactive Healthier Living with Heart Failure guidebook. Please copy and paste link into search bar. Use your mouse to scroll through the pages. Lots and lots of info / tips    HF Lillian greg  --- free smart phone greg available for [employer] and [employer]. Use your phone to track sodium / fluid intake,  symptoms, weight, etc.    [employer] - website-- [employer] is a dialysis company. All dialysis patients follow a renal diet which IS low sodium!! This website offers free seasonal cookbooks.   Each quarter, they will release 25-30 new recipes with a breakdown of calories, sodium, glucose, etc    [email or web address] -- more free recipes

## 2023-02-12 NOTE — PROGRESS NOTES
Internal Medicine Progress Note    Date: 2/12/2023   Patient: [address] Day: 1      CC: Shortness of Breath       Interval Hx     Seen at bedside, reports continued SOB and orthopnea. Denies CP, abd pain, N/V/D. Tolerating diet. BP slight elev, tachy low 100s, otherwise satting well on 3L NC. Labs show improving Cr, mag low (repl), WBC wnl, Hgb close to baseline. Cont diuresis with IV lasix  Good UOP thus far. 1.4L out/ -ve 1.2L. Objective     Vital Signs:  Patient Vitals for the past 8 hrs:   BP Temp src Pulse Resp SpO2   02/12/23 0748 -- -- -- -- 93 %   02/12/23 0745 (!) 148/101 Axillary (!) 105 24 90 %   02/12/23 0500 -- -- -- -- 99 %       Physical Exam  Constitutional:       General: He is not in acute distress. Appearance: He is ill-appearing. He is not toxic-appearing. HENT:      Head: Normocephalic. Mouth/Throat:      Mouth: Mucous membranes are moist.   Eyes:      General: No scleral icterus. Extraocular Movements: Extraocular movements intact. Pupils: Pupils are equal, round, and reactive to light. Cardiovascular:      Rate and Rhythm: Regular rhythm. Tachycardia present. Pulses: Normal pulses. Heart sounds: Normal heart sounds. Pulmonary:      Breath sounds: No stridor. Comments: Diffuse rales B/L, worse LL > UL. Slight expiratory wheezes in upper lobes. Able to speak in full sentences without requiring breaks. De-esc from BiPAP now on NC 3L  Chest:      Chest wall: No tenderness. Abdominal:      General: Bowel sounds are normal. There is distension. Palpations: Abdomen is soft. Tenderness: There is no abdominal tenderness. Comments: No fluid shift, difficult exam given obese abdomen   Musculoskeletal:      Right lower leg: Edema (2+) present. Left lower leg: Edema (2+) present. Neurological:      Mental Status: He is alert. Comments: AO x4, flat affect, CARDOZA 5/5.           Labs:  CBC:   Recent Labs 02/11/23  1823 02/12/23  0646   WBC 8.6 6.6   HGB 12.7* 12.7*   HCT 39.9* 40.6    177       BMP:   Recent Labs     02/11/23  1823 02/12/23  0646    139   K 3.8 4.0   CL 98* 98*   CO2 26 30   BUN 22* 21*   CREATININE 1.5* 1.4*   GLUCOSE 270* 238*   PHOS  --  5.6*     Magnesium:   Recent Labs     02/12/23  0114 02/12/23  0646   MG 1.60* 1.70*     LFT's:   Recent Labs     02/11/23 1823   AST 42*   *   BILITOT 0.3   ALKPHOS 93         U/A:   Recent Labs     02/12/23  0340   PHUR 6.0       Radiology:  XR CHEST PORTABLE   Final Result      Cardiomegaly with bilateral interstitial and alveolar pulmonary edema, compatible with congestive heart failure. Assessment & Plan       SOB 2/2 Acute on Chronic HFpEF  SOB, orthopnea worsening over past 2 days. Had big meal chinese food 2 days prior, lots of soy sauce. Last Echo 11/2022 shows EF 50%, mild LVH, indet DD. Wt from last dc (11/2022) at 311, now to 336. - Cardio cx  - repeat Echo  - Coreg, hydralazine, Aldactone  - Lasix 40 IV BID  - strict I/Os, daily wts, low Na diet   - 1.4L/-ve 1.2L thus far    Chest Pain, likely NSTEMI Type 2, less likely type 1  On arrival, trop 0.08 x2, EKG sinus tach, no significant ST-T changes. [facility] 11/2022 normal coronaries. Current smoker (4 cigs/day)  - CTPA - neg for PE  - trop - donwtrended 0.07 - dc trends  - thus likely due to HF exac    VAHE, possibly Cardiorenal type 1 vs 2  Cr elev to 1.5, baseline ~1.1. Likely in setting of volume overload. - Lasix 40 IV BID as above  - Strict I/Os, daily wts  - avoid nephrotoxic meds    HTN, likely secondary in setting of  Bilateral Adrenal Hyperplasia  On mulitple BP meds, BP still elev, less likely non-compliance given he resides at facility where meds are given to him. Elevated cortisol levels. Last admission, failure of dexamethasone to suppress. CT adrenals showing diffuse symmetric nodular thickening of B/L adrenals.   - BP stable for now  - on Aldactone  - needs f/u with Endo. Schizoaffective Disorder  From nursing/psych facility  - home Depakote and Seroquel    DVT PPx: SQH  Diet: ADULT DIET; Regular; Low Sodium (2 gm); 1200 ml   Code status:  Full Code     ELOS: > 2 nights  Barriers to discharge: Diuresis, volume overload  Disposition  - Preadmission: NH/psych facility  - Current: [facility]  - Upon discharge: back to facility    Will discuss with attending physician Dr. Wendy Valderrama MD     _____________________  Homa Stevenson MD   Internal Medicine Resident, PGY-1    Patient seen and examined on 2/12/2023, plan of care discussed with residents. Agree with their assessment and plan with following addendum:  Admitted with volume overload. Started on IV lasix. I/O incomplete but patient is urinating well. Lungs exam significant for crackles posteriorly. Cont with IV lasix.        Wendy Valderrama MD

## 2023-02-12 NOTE — CONSULTS
Reason for Consultation/Chief Complaint: SOB      History of Present Illness:  Cristina Arreguin is a 38 y.o. patient whom we were asked to see for sob/CHF/cmyop/HTN. Hx CHF, EF by Echo 11/22 of 50% and by cath 40%. Presents with complaints of sob beginning 3 days prior. Had [place] food for dinner. Woke up frinday with more sob. Wt up and more LE edema. Some chest pain. Noted intermittent cough. Has orthopnea. States compliant with meds. Presented to ER. Wt overall may be up 35 lbs over his dry wt. Noted hypoxia in ER. Reports diuretic at home has not been making him go as much. Past Medical History:   has a past medical history of COPD (chronic obstructive pulmonary disease) ([facility] Essential hypertension, Hypertensive heart disease with CHF ([address] MDD (major depressive disorder), Schizoaffective disorder ([address] Systolic CHF ([facility] Transsexualism, and Type 2 diabetes mellitus ([facility] Surgical History:   has no past surgical history on file. Social History:   reports that he has been smoking cigarettes. He has never used smokeless tobacco. He reports that he does not currently use drugs after having used the following drugs: Marijuana Kaleigh Lux). He reports that he does not drink alcohol. Family History:  No evidence for sudden cardiac death or premature CAD    Home Medications:  Were reviewed and are listed in nursing record. and/or listed below  Prior to Admission medications    Medication Sig Start Date End Date Taking?  Authorizing Provider   furosemide (LASIX) 20 MG tablet Take 20 mg by mouth daily   Yes Historical Provider, MD   insulin glargine (LANTUS;BASAGLAR) 100 UNIT/ML injection pen Inject 75 Units into the skin in the morning and at bedtime   Yes Historical Provider, MD   spironolactone (ALDACTONE) 50 MG tablet Take 50 mg by mouth daily   Yes Historical Provider, MD   hydrOXYzine HCl (ATARAX) 25 MG tablet Take 25 mg by mouth in the morning, at noon, and at bedtime   Yes Historical Provider, MD   pantoprazole sodium (PROTONIX) 40 MG PACK packet Take 40 mg by mouth every morning (before breakfast)   Yes Historical Provider, MD   insulin lispro, 1 Unit Dial, (HUMALOG/ADMELOG) 100 UNIT/ML SOPN Inject 15 Units into the skin 3 times daily (with meals) 11/23/22 2/12/23  Funmilayo Nielsen MD   atorvastatin (LIPITOR) 80 MG tablet Take 1 tablet by mouth nightly 11/21/22   Yuri Lockhart MD   aspirin 81 MG chewable tablet Take 81 mg by mouth daily    Historical Provider, MD   benztropine (COGENTIN) 1 MG tablet Take 1 mg by mouth 2 times daily    Historical Provider, MD   carvedilol (COREG) 25 MG tablet Take 50 mg by mouth 2 times daily (with meals)    Historical Provider, MD   vitamin B-12 (CYANOCOBALAMIN) 1000 MCG tablet Take 1,000 mcg by mouth daily    Historical Provider, MD   divalproex (DEPAKOTE SPRINKLE) 125 MG capsule Take 500 mg by mouth 3 times daily    Historical Provider, MD   fenofibrate micronized (LOFIBRA) 200 MG capsule Take 200 mg by mouth every morning (before breakfast)    Historical Provider, MD   ferrous gluconate (FERGON) 324 (38 Fe) MG tablet Take 324 mg by mouth daily (with breakfast)    Historical Provider, MD   haloperidol (HALDOL) 20 MG tablet Take 20 mg by mouth 3 times daily    Historical Provider, MD   hydrALAZINE (APRESOLINE) 50 MG tablet Take 50 mg by mouth 3 times daily    Historical Provider, MD   metFORMIN (GLUCOPHAGE) 1000 MG tablet Take 1,000 mg by mouth 2 times daily (with meals)    Historical Provider, MD   insulin aspart (NOVOLOG FLEXPEN) 100 UNIT/ML injection pen Inject into the skin 3 times daily (before meals) Inject per sliding scale: if 200-249 inject 4 units, if 250-299 inject 8 units, if 300-349 inject 10 units, if 350-399 inject 12 units    Historical Provider, MD   omeprazole (PRILOSEC) 20 MG delayed release capsule Take 20 mg by mouth daily    Historical Provider, MD   senna (SENOKOT) 8.6 MG tablet Take 1 tablet by mouth 2 times daily    Historical Provider, MD   QUEtiapine (SEROQUEL) 200 MG tablet Take 200 mg by mouth in the morning and 200 mg at noon and 200 mg in the evening and 200 mg before bedtime. Historical Provider, MD   Dulaglutide (TRULICITY) 2.72 WV/8.7UK SOPN Inject 0.75 mg into the skin once a week On Saturdays    Historical Provider, MD   acetaminophen (TYLENOL) 325 MG tablet Take 975 mg by mouth every 6 hours as needed for Pain    Historical Provider, MD        Allergies:  Patient has no known allergies. Review of Systems:       Constitutional: there has been no unanticipated weight loss. There's been no change in energy level, sleep pattern, or activity level. Eyes: No visual changes or diplopia. No scleral icterus. ENT: No Headaches, hearing loss or vertigo. No mouth sores or sore throat. Cardiovascular: No loss of consciousness. No hemoptysis, pleuritic pain, or phlebitis. Respiratory: as above   Gastrointestinal: No abdominal pain, appetite loss, blood in stools. No change in bowel or bladder habits. Genitourinary: No dysuria, trouble voiding, or hematuria. Musculoskeletal:  No gait disturbance, weakness or joint complaints. Integumentary: No rash or pruritis. All other systems reviewed negative as done.      Physical Examination:    Vitals:    02/12/23 0748   BP:    Pulse:    Resp:    Temp:    SpO2: 93%    Weight: (!) 336 lb 3.2 oz (152.5 kg)         General Appearance:  Alert, cooperative, no distress, appears stated age   Head:  Normocephalic, without obvious abnormality, atraumatic   Eyes:  PERRL, conjunctiva/corneas clear       Nose: Nares normal, no drainage or sinus tenderness   Throat: Lips, mucosa, and tongue normal   Neck: Supple, symmetrical, trachea midline       Lungs:   Decreased BS, respirations unlabored   Chest Wall:  No tenderness or deformity   Heart:  Regular rate and rhythm, S1, S2 normal, no murmur, rub or gallop   Abdomen:   Soft, non-tender, bowel sounds active all four quadrants Extremities: Extremities normal, atraumatic, no cyanosis or edema       Skin: Skin color, texture, turgor normal, no rashes or lesions   Pysch: Normal mood and affect   Neurologic: Normal gross motor and sensory exam.         Labs  CBC:   Lab Results   Component Value Date/Time    WBC 6.6 02/12/2023 06:46 AM    RBC 4.78 02/12/2023 06:46 AM    HGB 12.7 02/12/2023 06:46 AM    HCT 40.6 02/12/2023 06:46 AM    MCV 84.9 02/12/2023 06:46 AM    RDW 15.0 02/12/2023 06:46 AM     02/12/2023 06:46 AM     CMP:    Lab Results   Component Value Date/Time     02/12/2023 06:46 AM    K 4.0 02/12/2023 06:46 AM    K 3.8 02/11/2023 06:23 PM    CL 98 02/12/2023 06:46 AM    CO2 30 02/12/2023 06:46 AM    BUN 21 02/12/2023 06:46 AM    CREATININE 1.4 02/12/2023 06:46 AM    AGRATIO 1.6 02/11/2023 06:23 PM    LABGLOM >60 02/12/2023 06:46 AM    GLUCOSE 238 02/12/2023 06:46 AM    PROT 6.0 02/11/2023 06:23 PM    CALCIUM 9.3 02/12/2023 06:46 AM    BILITOT 0.3 02/11/2023 06:23 PM    ALKPHOS 93 02/11/2023 06:23 PM    AST 42 02/11/2023 06:23 PM     02/11/2023 06:23 PM     PT/INR:  No results found for: PTINR  Lab Results   Component Value Date    TROPONINI 0.07 (H) 02/12/2023       EKG:  I have reviewed EKG with the following interpretation:  Impression:  unavailable, ordered    Assessment  Patient Active Problem List   Diagnosis    Chest pain    Troponin level elevated    Hyperglycemia    Morbid obesity (HCC)    Electrolyte imbalance    Cushingoid facies    Acute on chronic respiratory failure with hypoxemia (HCC)    Acute on chronic combined systolic and diastolic heart failure (HCC)    Acute kidney injury superimposed on CKD ([facility]         Plan:    SOB/elevated BNP consistent with acute on chronic syst CHF. Appears dietary non compliance. Trop elevated in face of renal failure. Likely related to RF. Cath 11/22 showed no significant CAD. IV diuretic. Watch cr. Tx HTN. Titrate meds.   Continue coreg/hydral.  No ACE/arb currently due to elevated cr.

## 2023-02-12 NOTE — PROGRESS NOTES
4 Eyes Skin Assessment     The patient is being assess for  Admission    I agree that 2 RN's have performed a thorough Head to Toe Skin Assessment on the patient. ALL assessment sites listed below have been assessed. Areas assessed by both nurses:   [x]   Head, Face, and Ears   [x]   Shoulders, Back, and Chest  [x]   Arms, Elbows, and Hands   [x]   Coccyx, Sacrum, and Ischum  [x]   Legs, Feet, and Heels        Does the Patient have Skin Breakdown?   No         Ke[place] Prevention initiated:  No   Wound Care Orders initiated:  No      [employer] nurse consulted for Pressure Injury (Stage 3,4, Unstageable, DTI, NWPT, and Complex wounds):  No      Nurse 1 eSignature: Electronically signed by Vy Mcdaniel RN on 2/11/23 at 10:35 PM EST    **SHARE this note so that the co-signing nurse is able to place an eSignature**    Nurse 2 eSignature: Electronically signed by Jodie Soares RN on 9/87/33 at 10:32 PM EST

## 2023-02-12 NOTE — PLAN OF CARE
Problem: Discharge Planning  Goal: Discharge to home or other facility with appropriate resources  Outcome: Progressing  Flowsheets (Taken 2/11/2023 2229)  Discharge to home or other facility with appropriate resources:   Identify barriers to discharge with patient and caregiver   Arrange for needed discharge resources and transportation as appropriate   Identify discharge learning needs (meds, wound care, etc)   Refer to discharge planning if patient needs post-hospital services based on physician order or complex needs related to functional status, cognitive ability or social support system     Problem: Pain  Goal: Verbalizes/displays adequate comfort level or baseline comfort level  Outcome: Progressing  Flowsheets (Taken 2/11/2023 2229)  Verbalizes/displays adequate comfort level or baseline comfort level:   Encourage patient to monitor pain and request assistance   Assess pain using appropriate pain scale   Administer analgesics based on type and severity of pain and evaluate response   Implement non-pharmacological measures as appropriate and evaluate response   Consider cultural and social influences on pain and pain management   Notify Licensed Independent Practitioner if interventions unsuccessful or patient reports new pain

## 2023-02-13 ENCOUNTER — APPOINTMENT (OUTPATIENT)
Dept: GENERAL RADIOLOGY | Age: 43
DRG: 291 | End: 2023-02-13
Payer: COMMERCIAL

## 2023-02-13 LAB
ALBUMIN SERPL-MCNC: 3.5 G/DL (ref 3.4–5)
ANION GAP SERPL CALCULATED.3IONS-SCNC: 9 MMOL/L (ref 3–16)
BASOPHILS ABSOLUTE: 0 K/UL (ref 0–0.2)
BASOPHILS RELATIVE PERCENT: 0.3 %
BUN BLDV-MCNC: 20 MG/DL (ref 7–20)
CALCIUM SERPL-MCNC: 8.9 MG/DL (ref 8.3–10.6)
CHLORIDE BLD-SCNC: 101 MMOL/L (ref 99–110)
CO2: 31 MMOL/L (ref 21–32)
CREAT SERPL-MCNC: 1.2 MG/DL (ref 0.9–1.3)
EOSINOPHILS ABSOLUTE: 0 K/UL (ref 0–0.6)
EOSINOPHILS RELATIVE PERCENT: 0.1 %
GFR SERPL CREATININE-BSD FRML MDRD: >60 ML/MIN/{1.73_M2}
GLUCOSE BLD-MCNC: 215 MG/DL (ref 70–99)
GLUCOSE BLD-MCNC: 254 MG/DL (ref 70–99)
GLUCOSE BLD-MCNC: 305 MG/DL (ref 70–99)
GLUCOSE BLD-MCNC: 332 MG/DL (ref 70–99)
GLUCOSE BLD-MCNC: 345 MG/DL (ref 70–99)
GLUCOSE BLD-MCNC: 360 MG/DL (ref 70–99)
GLUCOSE BLD-MCNC: 371 MG/DL (ref 70–99)
HCT VFR BLD CALC: 39.1 % (ref 40.5–52.5)
HEMOGLOBIN: 12.4 G/DL (ref 13.5–17.5)
LYMPHOCYTES ABSOLUTE: 1.3 K/UL (ref 1–5.1)
LYMPHOCYTES RELATIVE PERCENT: 22.3 %
MAGNESIUM: 1.9 MG/DL (ref 1.8–2.4)
MCH RBC QN AUTO: 27.1 PG (ref 26–34)
MCHC RBC AUTO-ENTMCNC: 31.8 G/DL (ref 31–36)
MCV RBC AUTO: 85.2 FL (ref 80–100)
MONOCYTES ABSOLUTE: 0.9 K/UL (ref 0–1.3)
MONOCYTES RELATIVE PERCENT: 16.3 %
NEUTROPHILS ABSOLUTE: 3.5 K/UL (ref 1.7–7.7)
NEUTROPHILS RELATIVE PERCENT: 61 %
PDW BLD-RTO: 15.1 % (ref 12.4–15.4)
PERFORMED ON: ABNORMAL
PHOSPHORUS: 3.8 MG/DL (ref 2.5–4.9)
PLATELET # BLD: 174 K/UL (ref 135–450)
PMV BLD AUTO: 10.2 FL (ref 5–10.5)
POTASSIUM SERPL-SCNC: 3.7 MMOL/L (ref 3.5–5.1)
PRO-BNP: 1274 PG/ML (ref 0–124)
RBC # BLD: 4.58 M/UL (ref 4.2–5.9)
SODIUM BLD-SCNC: 141 MMOL/L (ref 136–145)
WBC # BLD: 5.7 K/UL (ref 4–11)

## 2023-02-13 PROCEDURE — 2500000003 HC RX 250 WO HCPCS: Performed by: STUDENT IN AN ORGANIZED HEALTH CARE EDUCATION/TRAINING PROGRAM

## 2023-02-13 PROCEDURE — 85025 COMPLETE CBC W/AUTO DIFF WBC: CPT

## 2023-02-13 PROCEDURE — 83735 ASSAY OF MAGNESIUM: CPT

## 2023-02-13 PROCEDURE — 6370000000 HC RX 637 (ALT 250 FOR IP)

## 2023-02-13 PROCEDURE — 6360000002 HC RX W HCPCS: Performed by: STUDENT IN AN ORGANIZED HEALTH CARE EDUCATION/TRAINING PROGRAM

## 2023-02-13 PROCEDURE — 83880 ASSAY OF NATRIURETIC PEPTIDE: CPT

## 2023-02-13 PROCEDURE — 71045 X-RAY EXAM CHEST 1 VIEW: CPT

## 2023-02-13 PROCEDURE — 99223 1ST HOSP IP/OBS HIGH 75: CPT | Performed by: INTERNAL MEDICINE

## 2023-02-13 PROCEDURE — 80069 RENAL FUNCTION PANEL: CPT

## 2023-02-13 PROCEDURE — 2060000000 HC ICU INTERMEDIATE R&B

## 2023-02-13 PROCEDURE — 36415 COLL VENOUS BLD VENIPUNCTURE: CPT

## 2023-02-13 PROCEDURE — 6360000002 HC RX W HCPCS: Performed by: INTERNAL MEDICINE

## 2023-02-13 PROCEDURE — 2580000003 HC RX 258: Performed by: STUDENT IN AN ORGANIZED HEALTH CARE EDUCATION/TRAINING PROGRAM

## 2023-02-13 PROCEDURE — 6370000000 HC RX 637 (ALT 250 FOR IP): Performed by: STUDENT IN AN ORGANIZED HEALTH CARE EDUCATION/TRAINING PROGRAM

## 2023-02-13 RX ORDER — INSULIN LISPRO 100 [IU]/ML
10 INJECTION, SOLUTION INTRAVENOUS; SUBCUTANEOUS
Status: DISCONTINUED | OUTPATIENT
Start: 2023-02-13 | End: 2023-02-14

## 2023-02-13 RX ORDER — FUROSEMIDE 10 MG/ML
40 INJECTION INTRAMUSCULAR; INTRAVENOUS 3 TIMES DAILY
Status: DISCONTINUED | OUTPATIENT
Start: 2023-02-13 | End: 2023-02-14

## 2023-02-13 RX ADMIN — QUETIAPINE FUMARATE 200 MG: 200 TABLET ORAL at 08:23

## 2023-02-13 RX ADMIN — HEPARIN SODIUM 5000 UNITS: 5000 INJECTION INTRAVENOUS; SUBCUTANEOUS at 13:35

## 2023-02-13 RX ADMIN — INSULIN GLARGINE 75 UNITS: 100 INJECTION, SOLUTION SUBCUTANEOUS at 10:44

## 2023-02-13 RX ADMIN — QUETIAPINE FUMARATE 200 MG: 200 TABLET ORAL at 13:25

## 2023-02-13 RX ADMIN — FUROSEMIDE 40 MG: 10 INJECTION, SOLUTION INTRAMUSCULAR; INTRAVENOUS at 08:22

## 2023-02-13 RX ADMIN — INSULIN LISPRO 16 UNITS: 100 INJECTION, SOLUTION INTRAVENOUS; SUBCUTANEOUS at 13:35

## 2023-02-13 RX ADMIN — HEPARIN SODIUM 5000 UNITS: 5000 INJECTION INTRAVENOUS; SUBCUTANEOUS at 07:17

## 2023-02-13 RX ADMIN — HYDRALAZINE HYDROCHLORIDE 25 MG: 25 TABLET, FILM COATED ORAL at 20:58

## 2023-02-13 RX ADMIN — QUETIAPINE FUMARATE 200 MG: 200 TABLET ORAL at 20:58

## 2023-02-13 RX ADMIN — CARVEDILOL 50 MG: 25 TABLET, FILM COATED ORAL at 08:23

## 2023-02-13 RX ADMIN — SODIUM CHLORIDE, PRESERVATIVE FREE 10 ML: 5 INJECTION INTRAVENOUS at 20:00

## 2023-02-13 RX ADMIN — CARVEDILOL 50 MG: 25 TABLET, FILM COATED ORAL at 16:32

## 2023-02-13 RX ADMIN — ACETAMINOPHEN 650 MG: 325 TABLET ORAL at 10:43

## 2023-02-13 RX ADMIN — ASPIRIN 81 MG 81 MG: 81 TABLET ORAL at 08:23

## 2023-02-13 RX ADMIN — FUROSEMIDE 40 MG: 10 INJECTION, SOLUTION INTRAMUSCULAR; INTRAVENOUS at 13:35

## 2023-02-13 RX ADMIN — ATORVASTATIN CALCIUM 80 MG: 80 TABLET, FILM COATED ORAL at 20:58

## 2023-02-13 RX ADMIN — HEPARIN SODIUM 5000 UNITS: 5000 INJECTION INTRAVENOUS; SUBCUTANEOUS at 21:30

## 2023-02-13 RX ADMIN — FUROSEMIDE 40 MG: 10 INJECTION, SOLUTION INTRAMUSCULAR; INTRAVENOUS at 20:59

## 2023-02-13 RX ADMIN — LABETALOL HYDROCHLORIDE 5 MG: 5 INJECTION, SOLUTION INTRAVENOUS at 18:39

## 2023-02-13 RX ADMIN — INSULIN LISPRO 4 UNITS: 100 INJECTION, SOLUTION INTRAVENOUS; SUBCUTANEOUS at 21:24

## 2023-02-13 RX ADMIN — DIVALPROEX SODIUM 500 MG: 125 CAPSULE ORAL at 13:33

## 2023-02-13 RX ADMIN — SPIRONOLACTONE 50 MG: 50 TABLET ORAL at 08:22

## 2023-02-13 RX ADMIN — HYDRALAZINE HYDROCHLORIDE 25 MG: 25 TABLET, FILM COATED ORAL at 08:23

## 2023-02-13 RX ADMIN — INSULIN GLARGINE 75 UNITS: 100 INJECTION, SOLUTION SUBCUTANEOUS at 21:23

## 2023-02-13 RX ADMIN — DIVALPROEX SODIUM 500 MG: 125 CAPSULE ORAL at 08:30

## 2023-02-13 RX ADMIN — INSULIN LISPRO 4 UNITS: 100 INJECTION, SOLUTION INTRAVENOUS; SUBCUTANEOUS at 09:59

## 2023-02-13 RX ADMIN — QUETIAPINE FUMARATE 200 MG: 200 TABLET ORAL at 16:33

## 2023-02-13 RX ADMIN — INSULIN LISPRO 10 UNITS: 100 INJECTION, SOLUTION INTRAVENOUS; SUBCUTANEOUS at 19:57

## 2023-02-13 RX ADMIN — DIVALPROEX SODIUM 500 MG: 125 CAPSULE ORAL at 20:58

## 2023-02-13 RX ADMIN — SODIUM CHLORIDE, PRESERVATIVE FREE 10 ML: 5 INJECTION INTRAVENOUS at 08:25

## 2023-02-13 RX ADMIN — HYDRALAZINE HYDROCHLORIDE 25 MG: 25 TABLET, FILM COATED ORAL at 13:25

## 2023-02-13 RX ADMIN — INSULIN LISPRO 12 UNITS: 100 INJECTION, SOLUTION INTRAVENOUS; SUBCUTANEOUS at 17:48

## 2023-02-13 ASSESSMENT — PAIN DESCRIPTION - DESCRIPTORS: DESCRIPTORS: ACHING

## 2023-02-13 ASSESSMENT — PAIN SCALES - WONG BAKER
WONGBAKER_NUMERICALRESPONSE: 0

## 2023-02-13 ASSESSMENT — PAIN SCALES - GENERAL
PAINLEVEL_OUTOF10: 0
PAINLEVEL_OUTOF10: 3
PAINLEVEL_OUTOF10: 0

## 2023-02-13 ASSESSMENT — PAIN DESCRIPTION - LOCATION: LOCATION: HEAD

## 2023-02-13 ASSESSMENT — PAIN DESCRIPTION - PAIN TYPE: TYPE: ACUTE PAIN

## 2023-02-13 ASSESSMENT — PAIN - FUNCTIONAL ASSESSMENT: PAIN_FUNCTIONAL_ASSESSMENT: ACTIVITIES ARE NOT PREVENTED

## 2023-02-13 ASSESSMENT — PAIN DESCRIPTION - ONSET: ONSET: ON-GOING

## 2023-02-13 ASSESSMENT — PAIN DESCRIPTION - ORIENTATION: ORIENTATION: MID

## 2023-02-13 ASSESSMENT — PAIN DESCRIPTION - FREQUENCY: FREQUENCY: CONTINUOUS

## 2023-02-13 NOTE — PROGRESS NOTES
Physician Progress Note      PATIENT:               Fernando Newell  CSN #:                  [id number]  :                       1980  ADMIT DATE:       2023 6:02 PM  [address] [place] DATE:  RESPONDING  PROVIDER #:        Morena Trejo MD          QUERY TEXT:    Patient admitted with chest pain. Noted documentation of type II MI in the   H&P. In order to support the diagnosis of type II MI, please refer to 4th   universal definition of MI below and include additional clinical indicators in   your documentation. ? Or please document if the diagnosis of type II MI has   been ruled out after study. The medical record reflects the following:  ?? Risk Factors: 39 yo w/ chest pain, CHF exacerbation  ? ? Clinical Indicators: Per H&P: Troponemia 2/2 NSTEMI II. Troponins    0.10, 0.09. On admit 0.09 - 0.07. Per Cardiology:  Trop elevated in face of   renal failure. ?? Treatment: Trend troponins, EKG, cardiology consult. Fourth Universal Definition of Myocardial Infarction:  Clearly separates MI   from myocardial injury. Patients with elevated blood troponin levels but   without clinical evidence of ischemia are said to have had a myocardial   injury. ? To have a myocardial infarction requires both an elevated troponin   blood test along with at least one of the following:  - Symptoms of acute myocardial ischemia (Types 1 - 5 MI)  - Clinical evidence of ischemia, as evidenced in an electrocardiogram (EKG)   showing new ischemic changes (Type 1, Type 2, Type 3, or Type 4a MI)  - Development of pathological Q waves (Types 1 - 5 MI)  - Imaging evidence of new loss of viable myocardium or new regional wall   motion abnormality in a pattern consistent with an ischemic etiology (Types 1   - 5 MI)  Options provided:  -- MI type II present as evidenced by, Please document indicators of   myocardial infarction.   -- MI type II ruled out after study and demand ischemia confirmed  -- Other - I will add my own diagnosis  -- Disagree - Not applicable / Not valid  -- Disagree - Clinically unable to determine / Unknown  -- Refer to Clinical Documentation Reviewer    PROVIDER RESPONSE TEXT:    Type II MI was ruled out after study and demand ischemia confirmed. Query created by: Annabel Calderon on 2/13/2023 1:27 PM      QUERY TEXT:    Patient admitted with chest pain. Noted documentation of Acute Kidney Injury   in the H&P. In order to support the diagnosis of VAHE, please include   additional clinical indicators in your documentation. ? Or please document if   the diagnosis of VAHE has been ruled out after further study. The medical record reflects the following:  Risk Factors: 39 yo obese male w/ CHF exacerbation  Clinical Indicators: Per H&P: VAHE. Pt baseline Cr is 1.1 and is elevated to   1.5. Treatment: Lab monitoring. Defined by Kidney Disease Improving Global Outcomes (KDIGO) clinical practice   guideline for acute kidney injury:  -Increase in SCr by greater than or equal to 0.3 mg/dl within 48 hours; or  -Increase or decrease in SCr to greater than or equal to 1.5 times baseline,   which is known or presumed to have occurred within the prior 7 days; or  -Urine volume < 0.5ml/kg/h for 6 hours. Options provided:  -- Acute kidney injury evidenced by, Please document evidence as well as a   numerical baseline creatinine, if known. -- Acute kidney injury ruled out after study  -- Other - I will add my own diagnosis  -- Disagree - Not applicable / Not valid  -- Disagree - Clinically unable to determine / Unknown  -- Refer to Clinical Documentation Reviewer    PROVIDER RESPONSE TEXT:    Acute kidney injury was ruled out after study. Query created by: Annabel Calderon on 2/13/2023 1:32 PM      QUERY TEXT:    Pt admitted with CHF exacerbation. Pt noted to have increased oxygen needs. If possible, please document in the progress notes and discharge summary if   you are evaluating and/or treating any of the following:     The medical record reflects the following:  Risk Factors: 37 yo obese male w/ CHF exacerbation  Clinical Indicators: Per ED: respiratory distress requiring BiPAP. RESPIRATORY: Increased work of breathing with tachypnea with patient on   nonrebreather nasal cannula oxygen speaking in short sentences. sats 60%. RR   34. Treatment: PAP, non-rebreather. Options provided:  -- Acute respiratory failure with hypoxia  -- Acute respiratory failure with hypercapnia  -- Acute respiratory failure with hypoxia and hypercapnia  -- Other - I will add my own diagnosis  -- Disagree - Not applicable / Not valid  -- Disagree - Clinically unable to determine / Unknown  -- Refer to Clinical Documentation Reviewer    PROVIDER RESPONSE TEXT:    This patient is in acute respiratory failure with hypoxia.     Query created by: Roberto Carlos Whitten on 2/13/2023 1:35 PM      Electronically signed by:  Dona Rust MD 2/13/2023 5:36 PM

## 2023-02-13 NOTE — CONSULTS
Cardiology Consultation                                                                    Pt Name: Julianna Nguyễn  Age: 38 y.o. Sex: male  : 1980  Location: 9303/6754-85    Referring Physician: Carine Sellers MD  Primary cardiologist: Dr Yessenia Gunderson      Reason for Consult:     Reason for Consultation/Chief Complaint: AHF    HPI:      Patient is a 39 yo man with cognitive disability (currently living at [address]), COPD on 2 liters oxygen, asthma, HTN, HFrEF and poorly controlled DM and HLP. Echo 2022: mild LVD, mild to mod LVH, EF 45-50%, basal inferior and inferoseptal HK. LVEF similar to echo from . [facility] 2022: Normal coronaries. Home diuretics: Lasix 40 mg daily and spironolactone 50 mg daily. Patient presented to the emergency room on  with progressively worse shortness of breath, chest pain, leg swelling over the last couple of days. Has been compliant with his medications however admits to excess salt in his diet. Patient was admitted for acute heart failure and hypertensive urgency (/120 mmHg). Dr. Gamal Nicholas was consulted during the weekend to assess the patient. He consulted me today to assume care for acute heart failure. G consistent with sinus tachycardia, nonspecific changes. Chest x-ray consistent with pulmonary edema. Histories     Past Medical History:   has a past medical history of COPD (chronic obstructive pulmonary disease) ([address] Essential hypertension, Hypertensive heart disease with CHF ([address] MDD (major depressive disorder), Schizoaffective disorder ([address] Systolic CHF ([address] Transsexualism, and Type 2 diabetes mellitus ([facility] [address] Surgical History:   has no past surgical history on file. Social History:   reports that he has been smoking cigarettes. He has never used smokeless tobacco. He reports that he does not currently use drugs after having used the following drugs: Marijuana [place]).  He reports that he does not drink alcohol. Family History:  No evidence for sudden cardiac death or premature CAD      Medications:       Home Medications  Were reviewed and are listed in nursing record. and/or listed below  Prior to Admission medications    Medication Sig Start Date End Date Taking?  Authorizing Provider   furosemide (LASIX) 20 MG tablet Take 20 mg by mouth daily   Yes Historical Provider, MD   insulin glargine (LANTUS;BASAGLAR) 100 UNIT/ML injection pen Inject 75 Units into the skin in the morning and at bedtime   Yes Historical Provider, MD   spironolactone (ALDACTONE) 50 MG tablet Take 50 mg by mouth daily   Yes Historical Provider, MD   hydrOXYzine HCl (ATARAX) 25 MG tablet Take 25 mg by mouth in the morning, at noon, and at bedtime   Yes Historical Provider, MD   pantoprazole sodium (PROTONIX) 40 MG PACK packet Take 40 mg by mouth every morning (before breakfast)   Yes Historical Provider, MD   insulin lispro, 1 Unit Dial, (HUMALOG/ADMELOG) 100 UNIT/ML SOPN Inject 15 Units into the skin 3 times daily (with meals) 11/23/22 2/12/23  Eddie Lynn MD   atorvastatin (LIPITOR) 80 MG tablet Take 1 tablet by mouth nightly 11/21/22   Radu Hodges MD   aspirin 81 MG chewable tablet Take 81 mg by mouth daily    Historical Provider, MD   benztropine (COGENTIN) 1 MG tablet Take 1 mg by mouth 2 times daily    Historical Provider, MD   carvedilol (COREG) 25 MG tablet Take 50 mg by mouth 2 times daily (with meals)    Historical Provider, MD   vitamin B-12 (CYANOCOBALAMIN) 1000 MCG tablet Take 1,000 mcg by mouth daily    Historical Provider, MD   divalproex (DEPAKOTE SPRINKLE) 125 MG capsule Take 500 mg by mouth 3 times daily    Historical Provider, MD   fenofibrate micronized (LOFIBRA) 200 MG capsule Take 200 mg by mouth every morning (before breakfast)    Historical Provider, MD   ferrous gluconate (FERGON) 324 (38 Fe) MG tablet Take 324 mg by mouth daily (with breakfast)    Historical Provider, MD   haloperidol (HALDOL) 20 MG tablet Take 20 mg by mouth 3 times daily    Historical Provider, MD   hydrALAZINE (APRESOLINE) 50 MG tablet Take 50 mg by mouth 3 times daily    Historical Provider, MD   metFORMIN (GLUCOPHAGE) 1000 MG tablet Take 1,000 mg by mouth 2 times daily (with meals)    Historical Provider, MD   insulin aspart (NOVOLOG FLEXPEN) 100 UNIT/ML injection pen Inject into the skin 3 times daily (before meals) Inject per sliding scale: if 200-249 inject 4 units, if 250-299 inject 8 units, if 300-349 inject 10 units, if 350-399 inject 12 units    Historical Provider, MD   omeprazole (PRILOSEC) 20 MG delayed release capsule Take 20 mg by mouth daily    Historical Provider, MD   senna (SENOKOT) 8.6 MG tablet Take 1 tablet by mouth 2 times daily    Historical Provider, MD   QUEtiapine (SEROQUEL) 200 MG tablet Take 200 mg by mouth in the morning and 200 mg at noon and 200 mg in the evening and 200 mg before bedtime.     Historical Provider, MD   Dulaglutide (TRULICITY) 8.14 IK/5.1IK SOPN Inject 0.75 mg into the skin once a week On Saturdays    Historical Provider, MD   acetaminophen (TYLENOL) 325 MG tablet Take 975 mg by mouth every 6 hours as needed for Pain    Historical Provider, MD          Inpatient Medications:   furosemide  40 mg IntraVENous TID    insulin glargine  75 Units SubCUTAneous BID    aspirin  81 mg Oral Daily    atorvastatin  80 mg Oral Nightly    carvedilol  50 mg Oral BID WC    hydrALAZINE  25 mg Oral TID    QUEtiapine  200 mg Oral 4x Daily    spironolactone  50 mg Oral Daily    divalproex  500 mg Oral TID    insulin lispro  0-16 Units SubCUTAneous TID     insulin lispro  0-4 Units SubCUTAneous Nightly    sodium chloride flush  5-40 mL IntraVENous 2 times per day    heparin (porcine)  5,000 Units SubCUTAneous 3 times per day       IV drips:   dextrose      sodium chloride      dextrose         PRN:  perflutren lipid microspheres, glucose, dextrose bolus **OR** dextrose bolus, glucagon (rDNA), dextrose, labetalol, hydrALAZINE, sodium chloride flush, sodium chloride, ondansetron **OR** ondansetron, polyethylene glycol, acetaminophen **OR** acetaminophen, glucose, dextrose bolus **OR** dextrose bolus, glucagon (rDNA), dextrose    Allergy:     Patient has no known allergies. Review of Systems:     All 12 point review of symptoms completed. Pertinent positives identified in the HPI, all other review of symptoms negative as below. CONSTITUTIONAL: No fatigue  SKIN: No rash or pruritis. EYES: No visual changes or diplopia. No scleral icterus. ENT: No Headaches, hearing loss or vertigo. No mouth sores or sore throat. CARDIOVASCULAR: No chest pain/chest pressure/chest discomfort. No palpitations. + edema. RESPIRATORY: + dyspnea. No cough or wheezing, no sputum production. GASTROINTESTINAL: No N/V/D. No abdominal pain, appetite loss, blood in stools. GENITOURINARY: No dysuria, trouble voiding, or hematuria. MUSCULOSKELETAL:  No gait disturbance, weakness or joint complaints. NEUROLOGICAL: No headache, diplopia, change in muscle strength, numbness or tingling. No change in gait, balance, coordination, mood, affect, memory, mentation, behavior. ENDOCRINE: No excessive thirst, fluid intake, or urination. No tremor. HEMATOLOGIC: No abnormal bruising or bleeding. ALLERGY: No nasal congestion or hives.       Physical Examination:     Vitals:    02/13/23 0948 02/13/23 1243 02/13/23 1250 02/13/23 1325   BP:  (!) 153/111  (!) 159/115   Pulse: 95 91  99   Resp:  18  25   Temp:  98 °F (36.7 °C)     TempSrc:  Axillary     SpO2:  98% 95% 92%   Weight:       Height:           Wt Readings from Last 3 Encounters:   02/13/23 (!) 331 lb 12.7 oz (150.5 kg)   12/29/22 (!) 320 lb (145.2 kg)   12/12/22 (!) 322 lb 3.2 oz (146.1 kg)         General Appearance:  Alert, cooperative, no distress, appears stated age Appropriate weight   Head:  Normocephalic, without obvious abnormality, atraumatic   Eyes:  PERRL, conjunctiva/corneas clear EOM intact  Ears normal   Throat no lesions       Nose: Nares normal, no drainage or sinus tenderness   Throat: Lips, mucosa, and tongue normal   Neck: Supple, symmetrical, trachea midline, no adenopathy, thyroid: not enlarged, symmetric, no tenderness/mass/nodules, no carotid bruit. Lungs:   Respirations unlabored, basilar ronchi. Chest Wall:  No tenderness or deformity   Heart:  Regular rhythm, rate is controlled, S1, S2 normal, there is no murmur, there is no rub or gallop, cannot assess jvd, 1+ bilateral lower extremity edema   Abdomen:   Soft, non-tender, bowel sounds active all four quadrants,  no masses, no organomegaly       Extremities: Extremities normal, atraumatic, no cyanosis. Pulses: 2+ and symmetric   Skin: Skin color, texture, turgor normal, no rashes or lesions   Pysch: Normal mood and affect   Neurologic: Normal gross motor and sensory exam.  Cranial nerves intact        Labs:     Recent Labs     02/11/23 1823 02/12/23 0114 02/12/23  0646 02/13/23  1004     --  139 141   K 3.8  --  4.0 3.7   BUN 22*  --  21* 20   CREATININE 1.5*  --  1.4* 1.2   CL 98*  --  98* 101   CO2 26  --  30 31   GLUCOSE 270*  --  238* 254*   CALCIUM 9.4  --  9.3 8.9   MG  --  1.60* 1.70* 1.90     Recent Labs     02/11/23 1823 02/12/23  0646 02/13/23  1004   WBC 8.6 6.6 5.7   HGB 12.7* 12.7* 12.4*   HCT 39.9* 40.6 39.1*    177 174   MCV 84.2 84.9 85.2     No results for input(s): CHOLTOT, TRIG, HDL, CHOLHDL, LDL in the last 72 hours. Invalid input(s): Claus aKhn  No results for input(s): PTT, INR in the last 72 hours. Invalid input(s): PT  Recent Labs     02/11/23  1823 02/11/23 2042 02/12/23  0114 02/12/23  0646   TROPONINI 0.08* 0.08* 0.07* 0.07*     No results for input(s): BNP in the last 72 hours. No results for input(s): TSH in the last 72 hours.   No results for input(s): CHOL, HDL, LDLCALC, TRIG in the last 72 hours.]    Lab Results   Component Value Date    TROPONINI 0.07 (H) 02/12/2023         Telemetry:     Telemetry personally reviewed:  NSR    Assessment / Plan:     1. Acute on chronic HFrEF. Patient is volume overloaded and severely hypertensive. It was due to poor compliance with low-salt diet. She is systolic heart failure is nonischemic in etiology (hypertension induced) in view of normal coronaries on [facility]. 2.  Hypertensive urgency. 3.  Cognitive impairment  4. COPD on 2 L of oxygen at baseline        -Will change Lasix to 40 mg IV every 8 hours  - Continue with Coreg 50 mg p.o. twice daily, hydralazine 25 mg every 8 hours, spironolactone 50 mg daily.  - Strict I's and O's every shift and standing weights if possible, low-salt diet, daily BMP with reflex to Mg (correct lytes for goals K >4.0 and Mg > 2.0) and wean supplemental oxygen to off (or down to baseline supplemental oxygen requirements) for sats greater than 92-94%. I have personally reviewed the reports and images of labs, radiological studies, cardiac studies including ECG's and telemetry, current and old medical records. The note was completed using EMR and Dragon dictation system. Every effort was made to ensure accuracy; however, inadvertent computerized transcription errors may be present. All questions and concerns were addressed to the patient/family. Alternatives to my treatment were discussed. I would like to thank you for providing me the opportunity to participate in the care of your patient. If you have any questions, please do not hesitate to contact me.      Robinson Ackerman MD, [facility] [place], [address]  The [address]  [address] [address] [place] [address]  Ph: [phone]  Fax: [phone]

## 2023-02-13 NOTE — PLAN OF CARE
Problem: Pain  Goal: Verbalizes/displays adequate comfort level or baseline comfort level  2/13/2023 1439 by Leonor Almanzar RN  Outcome: Progressing  Flowsheets (Taken 2/13/2023 1435)  Verbalizes/displays adequate comfort level or baseline comfort level:   Encourage patient to monitor pain and request assistance   Assess pain using appropriate pain scale   Implement non-pharmacological measures as appropriate and evaluate response   Consider cultural and social influences on pain and pain management  Note: Pt able to notify nurse when in pain. Pt able to state level of pain. Rest, repositioning, and environmental changes being utilized to manage pain. Pharmaceutical intervention utilized. Patient reports satisfaction w/ pain management. Problem: Chronic Conditions and Co-morbidities  Goal: Patient's chronic conditions and co-morbidity symptoms are monitored and maintained or improved  2/13/2023 1439 by Leonor Almanzar RN  Outcome: Progressing  Flowsheets (Taken 2/13/2023 1435)  Care Plan - Patient's Chronic Conditions and Co-Morbidity Symptoms are Monitored and Maintained or Improved:   Monitor and assess patient's chronic conditions and comorbid symptoms for stability, deterioration, or improvement   Collaborate with multidisciplinary team to address chronic and comorbid conditions and prevent exacerbation or deterioration   Update acute care plan with appropriate goals if chronic or comorbid symptoms are exacerbated and prevent overall improvement and discharge  Note: BP elevated. Anti-hypertensives and diuretics given per order. Provider notified when parameters exceeded. Blood sugar monitored ACHS w/ provider notified when blood sugar is above 350. Insulin given per order. Able to wean pt to room air w/ SpO2 in low 90s on room air.      Problem: Respiratory - Adult  Goal: Achieves optimal ventilation and oxygenation  2/13/2023 1439 by Leonor Almanzar RN  Outcome: Progressing  Flowsheets (Taken 2/13/2023 3917)  Achieves optimal ventilation and oxygenation:   Assess for changes in respiratory status   Assess for changes in mentation and behavior   Position to facilitate oxygenation and minimize respiratory effort   Oxygen supplementation based on oxygen saturation or arterial blood gases   Assess and instruct to report shortness of breath or any respiratory difficulty  Note: Pt tachypneic at times. Normal respiratory effort. Weaned off oxygen using provider's guidelines. Pt on room air w/ SpO2 > 90%.

## 2023-02-13 NOTE — CARE COORDINATION
Case Management Assessment  Initial Evaluation    Date/Time of Evaluation: 2/13/2023 4:08 PM  Assessment Completed by: Ailin Weller    If patient is discharged prior to next notation, then this note serves as note for discharge by case management. Patient Name: Ayesha Rashid                   YOB: 1980  Diagnosis: Acute on chronic combined systolic and diastolic heart failure ([address] [I50.43]  NSTEMI (non-ST elevated myocardial infarction) ([address] [I21.4]  Acute respiratory failure with hypoxia (HCC) [J96.01]  Acute on chronic respiratory failure with hypoxemia (HCC) [J96.21]  Acute on chronic congestive heart failure, unspecified heart failure type ([address] [I50.9]                   Date / Time: 2/11/2023  6:02 PM    Patient Admission Status: Inpatient   Readmission Risk (Low < 19, Mod (19-27), High > 27): Readmission Risk Score: 22.3    Current PCP: Carlton Magallanes MD  PCP verified by CM? Yes    Chart Reviewed: Yes      History Provided by: Patient, Medical Record  Patient Orientation: Alert and Oriented    Patient Cognition: Alert    Hospitalization in the last 30 days (Readmission):  No    If yes, Readmission Assessment in CM Navigator will be completed. Advance Directives:      Code Status: Full Code   Patient's Primary Decision Maker is: Patient Declined (Legal Next of Kin Remains as Decision Maker) (legal guardianship paperwork in chart)      Discharge Planning:    Patient lives with: Alone Type of Home: Long-Term Care  Primary Care Giver: Other (Comment) (facility)  Patient Support Systems include: Other (Comment) (guardian)   Current Financial resources: Medicare, Medicaid  Current community resources: None  Current services prior to admission: Extended Care Facility            Current DME:              Type of Home Care services:  None    ADLS  Prior functional level: Independent in ADLs/IADLs  Current functional level:  Independent in ADLs/IADLs    PT AM-PAC:   /24  OT AM-PAC: /24    Family can provide assistance at DC: No  Would you like Case Management to discuss the discharge plan with any other family members/significant others, and if so, who? Yes (CM confirmed DCP w Terence Bustillo)  Plans to Return to Present Housing: Yes  Other Identified Issues/Barriers to RETURNING to current housing: none  Potential Assistance needed at discharge: Extended Care Facility            Potential DME:    Patient expects to discharge to: Long-term care  Plan for transportation at discharge:      Financial    Payor: Lino Gore / Plan: Kennedi Li / Product Type: *No Product type* /     Does insurance require precert for SNF: Yes    Potential assistance Purchasing Medications: No  Meds-to-Beds request: Yes      Jimmy Coulter, [address] Doyne Members [phone] - F [phone]  [address] [facility] [address]  Phone: [phone] Fax: [phone]      Notes:    Factors facilitating achievement of predicted outcomes: Caregiver support and facility     Barriers to discharge: Cognitive deficit, Depression, Medical complications, and Medication managment    Additional Case Management Notes:     CM spoke with pt at bedside, pt alert with flat affect. CM called [facility] to confirm LOC- Priscella Marshal to call CM back to confirm. CM called guardian, Terence Bsutillo, to confirm DCP to return to [facility]- agreeable to plan. Terence Bustillo stated pt has multiple legal guardians and gave me Brenda's contact number, CM put in emergency contacts. CM put legal guardianship paperwork in hard chart.     The Plan for Transition of Care is related to the following treatment goals of Acute on chronic combined systolic and diastolic heart failure (HCC) [I50.43]  NSTEMI (non-ST elevated myocardial infarction) [address] [I21.4]  Acute respiratory failure with hypoxia (HCC) [J96.01]  Acute on chronic respiratory failure with hypoxemia (HCC) [J96.21]  Acute on chronic congestive heart failure, unspecified heart failure type ([facility] [facility] [X90.2]    IF APPLICABLE: The Patient and/or patient representative Tiffany Dia and his family were provided with a choice of provider and agrees with the discharge plan. Freedom of choice list with basic dialogue that supports the patient's individualized plan of care/goals and shares the quality data associated with the providers was provided to: Patient   Patient Representative Name:       The Patient and/or Patient Representative Agree with the Discharge Plan?  Yes    Vickie Álvarez  Case Management Department  Ph: [phone]

## 2023-02-13 NOTE — PLAN OF CARE
Problem: Discharge Planning  Goal: Discharge to home or other facility with appropriate resources  2/13/2023 0142 by Merline Glasgow, RN  Outcome: Rickie Gomez (Taken 2/12/2023 1935)  Discharge to home or other facility with appropriate resources:   Identify barriers to discharge with patient and caregiver   Arrange for needed discharge resources and transportation as appropriate   Identify discharge learning needs (meds, wound care, etc)   Refer to discharge planning if patient needs post-hospital services based on physician order or complex needs related to functional status, cognitive ability or social support system  2/13/2023 0142 by Merline Glasgow, RN  Outcome: Progressing  Flowsheets (Taken 2/12/2023 1935)  Discharge to home or other facility with appropriate resources:   Identify barriers to discharge with patient and caregiver   Arrange for needed discharge resources and transportation as appropriate   Identify discharge learning needs (meds, wound care, etc)   Refer to discharge planning if patient needs post-hospital services based on physician order or complex needs related to functional status, cognitive ability or social support system     Problem: Pain  Goal: Verbalizes/displays adequate comfort level or baseline comfort level  2/13/2023 0142 by Merline Glasgow, RN  Outcome: Progressing  Flowsheets (Taken 2/12/2023 1935)  Verbalizes/displays adequate comfort level or baseline comfort level:   Encourage patient to monitor pain and request assistance   Assess pain using appropriate pain scale   Administer analgesics based on type and severity of pain and evaluate response   Implement non-pharmacological measures as appropriate and evaluate response   Consider cultural and social influences on pain and pain management   Notify Licensed Independent Practitioner if interventions unsuccessful or patient reports new pain  2/13/2023 0142 by Merline Glasgow, RN  Outcome: Progressing  Flowsheets (Taken 2/12/2023 1935)  Verbalizes/displays adequate comfort level or baseline comfort level:   Encourage patient to monitor pain and request assistance   Assess pain using appropriate pain scale   Administer analgesics based on type and severity of pain and evaluate response   Implement non-pharmacological measures as appropriate and evaluate response   Consider cultural and social influences on pain and pain management   Notify Licensed Independent Practitioner if interventions unsuccessful or patient reports new pain     Problem: Chronic Conditions and Co-morbidities  Goal: Patient's chronic conditions and co-morbidity symptoms are monitored and maintained or improved  2/13/2023 0142 by Ally Roman RN  Outcome: Progressing  Flowsheets (Taken 2/12/2023 1935)  Care Plan - Patient's Chronic Conditions and Co-Morbidity Symptoms are Monitored and Maintained or Improved:   Monitor and assess patient's chronic conditions and comorbid symptoms for stability, deterioration, or improvement   Collaborate with multidisciplinary team to address chronic and comorbid conditions and prevent exacerbation or deterioration   Update acute care plan with appropriate goals if chronic or comorbid symptoms are exacerbated and prevent overall improvement and discharge  2/13/2023 0142 by Ally Roman RN  Outcome: Progressing  Flowsheets (Taken 2/12/2023 1935)  Care Plan - Patient's Chronic Conditions and Co-Morbidity Symptoms are Monitored and Maintained or Improved:   Monitor and assess patient's chronic conditions and comorbid symptoms for stability, deterioration, or improvement   Collaborate with multidisciplinary team to address chronic and comorbid conditions and prevent exacerbation or deterioration   Update acute care plan with appropriate goals if chronic or comorbid symptoms are exacerbated and prevent overall improvement and discharge     Problem: Respiratory - Adult  Goal: Achieves optimal ventilation and oxygenation  2/13/2023 0142 by Othella Lennox, RN  Outcome: Progressing  Flowsheets (Taken 2/12/2023 1935)  Achieves optimal ventilation and oxygenation:   Assess for changes in respiratory status   Assess for changes in mentation and behavior   Position to facilitate oxygenation and minimize respiratory effort   Oxygen supplementation based on oxygen saturation or arterial blood gases   Encourage broncho-pulmonary hygiene including cough, deep breathe, incentive spirometry   Assess the need for suctioning and aspirate as needed   Assess and instruct to report shortness of breath or any respiratory difficulty   Respiratory therapy support as indicated  2/13/2023 0142 by Othella Lennox, RN  Outcome: Progressing  Flowsheets (Taken 2/12/2023 1935)  Achieves optimal ventilation and oxygenation:   Assess for changes in respiratory status   Assess for changes in mentation and behavior   Position to facilitate oxygenation and minimize respiratory effort   Oxygen supplementation based on oxygen saturation or arterial blood gases   Encourage broncho-pulmonary hygiene including cough, deep breathe, incentive spirometry   Assess the need for suctioning and aspirate as needed   Assess and instruct to report shortness of breath or any respiratory difficulty   Respiratory therapy support as indicated     Problem: Cardiovascular - Adult  Goal: Maintains optimal cardiac output and hemodynamic stability  Outcome: Progressing  Flowsheets (Taken 2/12/2023 1935)  Maintains optimal cardiac output and hemodynamic stability:   Monitor blood pressure and heart rate   Monitor urine output and notify Licensed Independent Practitioner for values outside of normal range   Assess for signs of decreased cardiac output  Goal: Absence of cardiac dysrhythmias or at baseline  Outcome: Progressing  Flowsheets (Taken 2/12/2023 1935)  Absence of cardiac dysrhythmias or at baseline:   Monitor cardiac rate and rhythm   Assess for signs of decreased cardiac output     Problem: Skin/Tissue Integrity - Adult  Goal: Incisions, wounds, or drain sites healing without S/S of infection  Outcome: Progressing  Flowsheets (Taken 2/12/2023 1935)  Incisions, Wounds, or Drain Sites Healing Without Sign and Symptoms of Infection:   ADMISSION and DAILY: Assess and document risk factors for pressure ulcer development   TWICE DAILY: Assess and document skin integrity   TWICE DAILY: Assess and document dressing/incision, wound bed, drain sites and surrounding tissue   Implement wound care per orders   Initiate isolation precautions as appropriate   Initiate pressure ulcer prevention bundle as indicated     Problem: Metabolic/Fluid and Electrolytes - Adult  Goal: Electrolytes maintained within normal limits  Outcome: Progressing  Flowsheets (Taken 2/12/2023 1935)  Electrolytes maintained within normal limits:   Monitor labs and assess patient for signs and symptoms of electrolyte imbalances   Administer electrolyte replacement as ordered   Monitor response to electrolyte replacements, including repeat lab results as appropriate   Fluid restriction as ordered   Instruct patient on fluid and nutrition restrictions as appropriate  Goal: Hemodynamic stability and optimal renal function maintained  Outcome: Progressing  Flowsheets (Taken 2/12/2023 1935)  Hemodynamic stability and optimal renal function maintained:   Monitor labs and assess for signs and symptoms of volume excess or deficit   Monitor intake, output and patient weight   Encourage oral intake as appropriate   Instruct patient on fluid and nutrition restrictions as appropriate

## 2023-02-13 NOTE — PROGRESS NOTES
Internal Medicine Progress Note    Date: 2/13/2023   Patient: [address] Day: 2      CC: Shortness of Breath       Interval Hx   Patient seen and examined at bedside this AM. NAEO. Patient complaining of some SOB with movement but states that it's becoming better. Currently saturating well on 2 L NC. Also complaining of some RLQ abdominal pain, but denies nausea or vomiting. Objective     Vital Signs:  Patient Vitals for the past 8 hrs:   BP Temp Temp src Pulse Resp SpO2 Weight   02/13/23 0335 (!) 161/104 98.2 °F (36.8 °C) Axillary (!) 103 26 96 % (!) 331 lb 12.7 oz (150.5 kg)   02/12/23 2325 (!) 154/90 98.1 °F (36.7 °C) Axillary 98 18 98 % --         Physical Exam  Constitutional:       General: He is not in acute distress. Appearance: He is ill-appearing. He is not toxic-appearing. HENT:      Head: Normocephalic. Mouth/Throat:      Mouth: Mucous membranes are moist.   Eyes:      General: No scleral icterus. Extraocular Movements: Extraocular movements intact. Pupils: Pupils are equal, round, and reactive to light. Cardiovascular:      Rate and Rhythm: Regular rhythm. Tachycardia present. Pulses: Normal pulses. Heart sounds: Normal heart sounds. Pulmonary:      Breath sounds: No stridor. Comments: Slight bilateral rales. Saturating well on 2L NC  Chest:      Chest wall: No tenderness. Abdominal:      General: Bowel sounds are normal. There is distension. Palpations: Abdomen is soft. Tenderness: There is no abdominal tenderness. Comments: No fluid shift, difficult exam given obese abdomen   Musculoskeletal:      Right lower leg: No edema. Left lower leg: No edema. Neurological:      Mental Status: He is alert. Comments: AO x4, flat affect, CARDOZA 5/5.           Labs:  CBC:   Recent Labs     02/11/23  1823 02/12/23  0646   WBC 8.6 6.6   HGB 12.7* 12.7*   HCT 39.9* 40.6    177         BMP:   Recent Labs     02/11/23 1823 02/12/23  0646    139   K 3.8 4.0   CL 98* 98*   CO2 26 30   BUN 22* 21*   CREATININE 1.5* 1.4*   GLUCOSE 270* 238*   PHOS  --  5.6*       Magnesium:   Recent Labs     02/12/23  0114 02/12/23  0646   MG 1.60* 1.70*       LFT's:   Recent Labs     02/11/23  1823   AST 42*   *   BILITOT 0.3   ALKPHOS 93           U/A:   Recent Labs     02/12/23  0340   PHUR 6.0         Radiology:  XR CHEST PORTABLE   Final Result      Cardiomegaly with bilateral interstitial and alveolar pulmonary edema, compatible with congestive heart failure. Assessment & Plan       SOB 2/2 Acute on Chronic HFpEF  SOB, orthopnea worsening over past 2 days. Had big meal chinese food 2 days prior, lots of soy sauce. Last Echo 11/2022 shows EF 50%, mild LVH, indet DD. Wt from last dc (11/2022) at 311, now to 336. CXR this AM similar to prior examination  - Cardio following  - Coreg, hydralazine, Aldactone  - Lasix 40 IV TID  - strict I/Os, daily wts, low Na diet   - 1.4L/-ve 1.2L thus far    Chest Pain, likely NSTEMI Type 2, less likely type 1  On arrival, trop 0.08 x2, EKG sinus tach, no significant ST-T changes. [facility] 11/2022 normal coronaries. Current smoker (4 cigs/day). CTPA - neg for PE. Trop downtrending  - No further work-up    VAHE, possibly Cardiorenal type 1 vs 2, resolved  Cr elev to 1.5, baseline ~1.1. Likely in setting of volume overload. - Lasix 40 IV BID as above  - Strict I/Os, daily wts  - avoid nephrotoxic meds    HTN, likely secondary in setting of  Bilateral Adrenal Hyperplasia  On mulitple BP meds, BP still elev, less likely non-compliance given he resides at facility where meds are given to him. Elevated cortisol levels. Last admission, failure of dexamethasone to suppress. CT adrenals showing diffuse symmetric nodular thickening of B/L adrenals. - BP stable for now  - on Aldactone  - needs outpatient f/u with Endo.     Schizoaffective Disorder  From nursing/psych facility  - home Depakote and Seroquel    DVT PPx: SQH  Diet: ADULT DIET; Regular; Low Fat/Low Chol/High Fiber/2 gm Na; 1200 ml   Code status:  Full Code     ELOS: > 2 nights  Barriers to discharge: Diuresis, volume overload  Disposition  - Preadmission: NH/psych facility  - Current: [facility]  - Upon discharge: back to facility    Will discuss with attending physician Dr. Keith Chahal MD    _____________________  Luiz Hall MD   Internal Medicine Resident, PGY-1      Patient seen and examined on , plan of care discussed with residents. Agree with their assessment and plan with following addendum:  Patient is breathing better. Urinating well on iv lasix. Eating uncontrollably likely due to psychiatric illness and meds. Trying to minimize salt intake.        Keith Chahal MD

## 2023-02-14 LAB
ANION GAP SERPL CALCULATED.3IONS-SCNC: 7 MMOL/L (ref 3–16)
BASOPHILS ABSOLUTE: 0 K/UL (ref 0–0.2)
BASOPHILS RELATIVE PERCENT: 0.5 %
BUN BLDV-MCNC: 17 MG/DL (ref 7–20)
CALCIUM SERPL-MCNC: 9.1 MG/DL (ref 8.3–10.6)
CHLORIDE BLD-SCNC: 98 MMOL/L (ref 99–110)
CO2: 33 MMOL/L (ref 21–32)
CREAT SERPL-MCNC: 1 MG/DL (ref 0.9–1.3)
EOSINOPHILS ABSOLUTE: 0 K/UL (ref 0–0.6)
EOSINOPHILS RELATIVE PERCENT: 0.2 %
GFR SERPL CREATININE-BSD FRML MDRD: >60 ML/MIN/{1.73_M2}
GLUCOSE BLD-MCNC: 161 MG/DL (ref 70–99)
GLUCOSE BLD-MCNC: 211 MG/DL (ref 70–99)
GLUCOSE BLD-MCNC: 251 MG/DL (ref 70–99)
GLUCOSE BLD-MCNC: 275 MG/DL (ref 70–99)
GLUCOSE BLD-MCNC: 315 MG/DL (ref 70–99)
GLUCOSE BLD-MCNC: 347 MG/DL (ref 70–99)
HCT VFR BLD CALC: 38.6 % (ref 40.5–52.5)
HEMOGLOBIN: 12.4 G/DL (ref 13.5–17.5)
LYMPHOCYTES ABSOLUTE: 1.5 K/UL (ref 1–5.1)
LYMPHOCYTES RELATIVE PERCENT: 30.4 %
MAGNESIUM: 1.9 MG/DL (ref 1.8–2.4)
MCH RBC QN AUTO: 27.1 PG (ref 26–34)
MCHC RBC AUTO-ENTMCNC: 32 G/DL (ref 31–36)
MCV RBC AUTO: 84.6 FL (ref 80–100)
MONOCYTES ABSOLUTE: 0.8 K/UL (ref 0–1.3)
MONOCYTES RELATIVE PERCENT: 17.3 %
NEUTROPHILS ABSOLUTE: 2.5 K/UL (ref 1.7–7.7)
NEUTROPHILS RELATIVE PERCENT: 51.6 %
PDW BLD-RTO: 15 % (ref 12.4–15.4)
PERFORMED ON: ABNORMAL
PLATELET # BLD: 187 K/UL (ref 135–450)
PMV BLD AUTO: 10 FL (ref 5–10.5)
POTASSIUM REFLEX MAGNESIUM: 4 MMOL/L (ref 3.5–5.1)
RBC # BLD: 4.56 M/UL (ref 4.2–5.9)
SODIUM BLD-SCNC: 138 MMOL/L (ref 136–145)
WBC # BLD: 4.9 K/UL (ref 4–11)

## 2023-02-14 PROCEDURE — 2580000003 HC RX 258: Performed by: STUDENT IN AN ORGANIZED HEALTH CARE EDUCATION/TRAINING PROGRAM

## 2023-02-14 PROCEDURE — 85025 COMPLETE CBC W/AUTO DIFF WBC: CPT

## 2023-02-14 PROCEDURE — 6370000000 HC RX 637 (ALT 250 FOR IP)

## 2023-02-14 PROCEDURE — 83735 ASSAY OF MAGNESIUM: CPT

## 2023-02-14 PROCEDURE — 6360000002 HC RX W HCPCS: Performed by: STUDENT IN AN ORGANIZED HEALTH CARE EDUCATION/TRAINING PROGRAM

## 2023-02-14 PROCEDURE — 2060000000 HC ICU INTERMEDIATE R&B

## 2023-02-14 PROCEDURE — 36415 COLL VENOUS BLD VENIPUNCTURE: CPT

## 2023-02-14 PROCEDURE — 99233 SBSQ HOSP IP/OBS HIGH 50: CPT | Performed by: INTERNAL MEDICINE

## 2023-02-14 PROCEDURE — 6370000000 HC RX 637 (ALT 250 FOR IP): Performed by: INTERNAL MEDICINE

## 2023-02-14 PROCEDURE — 80048 BASIC METABOLIC PNL TOTAL CA: CPT

## 2023-02-14 PROCEDURE — 2500000003 HC RX 250 WO HCPCS: Performed by: STUDENT IN AN ORGANIZED HEALTH CARE EDUCATION/TRAINING PROGRAM

## 2023-02-14 PROCEDURE — 6370000000 HC RX 637 (ALT 250 FOR IP): Performed by: STUDENT IN AN ORGANIZED HEALTH CARE EDUCATION/TRAINING PROGRAM

## 2023-02-14 PROCEDURE — 6360000002 HC RX W HCPCS: Performed by: INTERNAL MEDICINE

## 2023-02-14 RX ORDER — INSULIN LISPRO 100 [IU]/ML
5 INJECTION, SOLUTION INTRAVENOUS; SUBCUTANEOUS
Status: DISCONTINUED | OUTPATIENT
Start: 2023-02-14 | End: 2023-02-14

## 2023-02-14 RX ORDER — PANTOPRAZOLE SODIUM 40 MG/1
40 TABLET, DELAYED RELEASE ORAL
Status: DISCONTINUED | OUTPATIENT
Start: 2023-02-14 | End: 2023-02-15 | Stop reason: HOSPADM

## 2023-02-14 RX ORDER — FUROSEMIDE 40 MG/1
40 TABLET ORAL 2 TIMES DAILY
Status: DISCONTINUED | OUTPATIENT
Start: 2023-02-15 | End: 2023-02-15 | Stop reason: HOSPADM

## 2023-02-14 RX ORDER — HYDRALAZINE HYDROCHLORIDE 25 MG/1
25 TABLET, FILM COATED ORAL ONCE
Status: COMPLETED | OUTPATIENT
Start: 2023-02-14 | End: 2023-02-14

## 2023-02-14 RX ORDER — INSULIN LISPRO 100 [IU]/ML
10 INJECTION, SOLUTION INTRAVENOUS; SUBCUTANEOUS
Status: DISCONTINUED | OUTPATIENT
Start: 2023-02-14 | End: 2023-02-15

## 2023-02-14 RX ORDER — INSULIN LISPRO 100 [IU]/ML
5 INJECTION, SOLUTION INTRAVENOUS; SUBCUTANEOUS ONCE
Status: COMPLETED | OUTPATIENT
Start: 2023-02-14 | End: 2023-02-14

## 2023-02-14 RX ORDER — HYDRALAZINE HYDROCHLORIDE 50 MG/1
50 TABLET, FILM COATED ORAL 3 TIMES DAILY
Status: DISCONTINUED | OUTPATIENT
Start: 2023-02-14 | End: 2023-02-14

## 2023-02-14 RX ORDER — HYDRALAZINE HYDROCHLORIDE 50 MG/1
50 TABLET, FILM COATED ORAL 3 TIMES DAILY
Status: DISCONTINUED | OUTPATIENT
Start: 2023-02-14 | End: 2023-02-15 | Stop reason: HOSPADM

## 2023-02-14 RX ADMIN — HEPARIN SODIUM 5000 UNITS: 5000 INJECTION INTRAVENOUS; SUBCUTANEOUS at 05:46

## 2023-02-14 RX ADMIN — INSULIN LISPRO 8 UNITS: 100 INJECTION, SOLUTION INTRAVENOUS; SUBCUTANEOUS at 13:15

## 2023-02-14 RX ADMIN — SPIRONOLACTONE 50 MG: 50 TABLET ORAL at 07:50

## 2023-02-14 RX ADMIN — LABETALOL HYDROCHLORIDE 5 MG: 5 INJECTION, SOLUTION INTRAVENOUS at 04:07

## 2023-02-14 RX ADMIN — INSULIN LISPRO 12 UNITS: 100 INJECTION, SOLUTION INTRAVENOUS; SUBCUTANEOUS at 17:57

## 2023-02-14 RX ADMIN — DIVALPROEX SODIUM 500 MG: 125 CAPSULE ORAL at 13:32

## 2023-02-14 RX ADMIN — ATORVASTATIN CALCIUM 80 MG: 80 TABLET, FILM COATED ORAL at 21:02

## 2023-02-14 RX ADMIN — QUETIAPINE FUMARATE 200 MG: 200 TABLET ORAL at 07:50

## 2023-02-14 RX ADMIN — INSULIN GLARGINE 75 UNITS: 100 INJECTION, SOLUTION SUBCUTANEOUS at 20:58

## 2023-02-14 RX ADMIN — ASPIRIN 81 MG 81 MG: 81 TABLET ORAL at 07:50

## 2023-02-14 RX ADMIN — SODIUM CHLORIDE, PRESERVATIVE FREE 10 ML: 5 INJECTION INTRAVENOUS at 21:05

## 2023-02-14 RX ADMIN — HEPARIN SODIUM 5000 UNITS: 5000 INJECTION INTRAVENOUS; SUBCUTANEOUS at 13:32

## 2023-02-14 RX ADMIN — QUETIAPINE FUMARATE 200 MG: 200 TABLET ORAL at 21:02

## 2023-02-14 RX ADMIN — HYDRALAZINE HYDROCHLORIDE 50 MG: 50 TABLET, FILM COATED ORAL at 21:02

## 2023-02-14 RX ADMIN — HYDRALAZINE HYDROCHLORIDE 25 MG: 25 TABLET, FILM COATED ORAL at 07:50

## 2023-02-14 RX ADMIN — QUETIAPINE FUMARATE 200 MG: 200 TABLET ORAL at 17:19

## 2023-02-14 RX ADMIN — FUROSEMIDE 40 MG: 10 INJECTION, SOLUTION INTRAMUSCULAR; INTRAVENOUS at 13:26

## 2023-02-14 RX ADMIN — HYDRALAZINE HYDROCHLORIDE 25 MG: 25 TABLET, FILM COATED ORAL at 09:13

## 2023-02-14 RX ADMIN — DIVALPROEX SODIUM 500 MG: 125 CAPSULE ORAL at 21:01

## 2023-02-14 RX ADMIN — PANTOPRAZOLE SODIUM 40 MG: 40 TABLET, DELAYED RELEASE ORAL at 11:32

## 2023-02-14 RX ADMIN — CARVEDILOL 50 MG: 25 TABLET, FILM COATED ORAL at 07:50

## 2023-02-14 RX ADMIN — FUROSEMIDE 40 MG: 10 INJECTION, SOLUTION INTRAMUSCULAR; INTRAVENOUS at 09:13

## 2023-02-14 RX ADMIN — FUROSEMIDE 40 MG: 10 INJECTION, SOLUTION INTRAMUSCULAR; INTRAVENOUS at 21:04

## 2023-02-14 RX ADMIN — INSULIN LISPRO 5 UNITS: 100 INJECTION, SOLUTION INTRAVENOUS; SUBCUTANEOUS at 13:12

## 2023-02-14 RX ADMIN — INSULIN LISPRO 5 UNITS: 100 INJECTION, SOLUTION INTRAVENOUS; SUBCUTANEOUS at 09:27

## 2023-02-14 RX ADMIN — DIVALPROEX SODIUM 500 MG: 125 CAPSULE ORAL at 09:13

## 2023-02-14 RX ADMIN — INSULIN GLARGINE 75 UNITS: 100 INJECTION, SOLUTION SUBCUTANEOUS at 09:27

## 2023-02-14 RX ADMIN — INSULIN LISPRO 10 UNITS: 100 INJECTION, SOLUTION INTRAVENOUS; SUBCUTANEOUS at 17:57

## 2023-02-14 RX ADMIN — HYDRALAZINE HYDROCHLORIDE 50 MG: 50 TABLET, FILM COATED ORAL at 13:25

## 2023-02-14 RX ADMIN — LIDOCAINE HYDROCHLORIDE: 20 SOLUTION ORAL; TOPICAL at 11:37

## 2023-02-14 RX ADMIN — SODIUM CHLORIDE, PRESERVATIVE FREE 10 ML: 5 INJECTION INTRAVENOUS at 08:00

## 2023-02-14 RX ADMIN — QUETIAPINE FUMARATE 200 MG: 200 TABLET ORAL at 13:12

## 2023-02-14 RX ADMIN — LABETALOL HYDROCHLORIDE 5 MG: 5 INJECTION, SOLUTION INTRAVENOUS at 11:47

## 2023-02-14 RX ADMIN — INSULIN LISPRO 4 UNITS: 100 INJECTION, SOLUTION INTRAVENOUS; SUBCUTANEOUS at 20:58

## 2023-02-14 RX ADMIN — HEPARIN SODIUM 5000 UNITS: 5000 INJECTION INTRAVENOUS; SUBCUTANEOUS at 21:43

## 2023-02-14 RX ADMIN — CARVEDILOL 50 MG: 25 TABLET, FILM COATED ORAL at 17:19

## 2023-02-14 ASSESSMENT — PAIN SCALES - GENERAL
PAINLEVEL_OUTOF10: 0
PAINLEVEL_OUTOF10: 0
PAINLEVEL_OUTOF10: 3
PAINLEVEL_OUTOF10: 0

## 2023-02-14 ASSESSMENT — PAIN DESCRIPTION - PAIN TYPE: TYPE: ACUTE PAIN

## 2023-02-14 ASSESSMENT — PAIN DESCRIPTION - DESCRIPTORS: DESCRIPTORS: ACHING

## 2023-02-14 ASSESSMENT — PAIN DESCRIPTION - ONSET: ONSET: ON-GOING

## 2023-02-14 ASSESSMENT — PAIN - FUNCTIONAL ASSESSMENT: PAIN_FUNCTIONAL_ASSESSMENT: ACTIVITIES ARE NOT PREVENTED

## 2023-02-14 ASSESSMENT — PAIN DESCRIPTION - FREQUENCY: FREQUENCY: CONTINUOUS

## 2023-02-14 ASSESSMENT — PAIN DESCRIPTION - ORIENTATION: ORIENTATION: RIGHT;LOWER

## 2023-02-14 ASSESSMENT — PAIN DESCRIPTION - LOCATION: LOCATION: ABDOMEN

## 2023-02-14 NOTE — PLAN OF CARE
Problem: Pain  Goal: Verbalizes/displays adequate comfort level or baseline comfort level  Outcome: Progressing  Flowsheets (Taken 2/14/2023 1346)  Verbalizes/displays adequate comfort level or baseline comfort level:   Encourage patient to monitor pain and request assistance   Assess pain using appropriate pain scale   Administer analgesics based on type and severity of pain and evaluate response   Implement non-pharmacological measures as appropriate and evaluate response  Note: Pt able to verbalize pain. Repositioning, environmental changes and rest have been utilized to manage pain. Pharmaceutical and nonpharmaceutical interventions have been utilized to successfully decrease pain. Problem: Chronic Conditions and Co-morbidities  Goal: Patient's chronic conditions and co-morbidity symptoms are monitored and maintained or improved  Outcome: Progressing  Flowsheets (Taken 2/14/2023 1346)  Care Plan - Patient's Chronic Conditions and Co-Morbidity Symptoms are Monitored and Maintained or Improved:   Monitor and assess patient's chronic conditions and comorbid symptoms for stability, deterioration, or improvement   Collaborate with multidisciplinary team to address chronic and comorbid conditions and prevent exacerbation or deterioration   Update acute care plan with appropriate goals if chronic or comorbid symptoms are exacerbated and prevent overall improvement and discharge  Note: Pt hypertensive w/ HR being NSR to sinus tach on continuous telemetry. Pt on room air w/ SpO2 >90%. Pt tachypneic at times. Normal respiratory effort. Urine output WNL. Blood sugars monitored PRN and ACHS. Insulin given per order. Problem: Safety - Adult  Goal: Free from fall injury  Outcome: Progressing  Flowsheets (Taken 2/14/2023 1350)  Free From Fall Injury: Instruct family/caregiver on patient safety  Note: Pt is a Fall Risk. See Sana Aguilera Fall Risk Score. Pt bed in low position and side rails up.  Call light and belongings in reach. Pt encouraged to call for assistance. Bed alarm utilized w/ bed wheels locked. Avasys monitoring in place. Will continue with hourly rounds for PO intake, pain needs, toileting, and repositioning as needed.

## 2023-02-14 NOTE — PLAN OF CARE
Blood pressure at 4am is high, systolic BP is >943 as documented in flow sheets. PRN labetalol given. Problem: Pain  Goal: Verbalizes/displays adequate comfort level or baseline comfort level  2/13/2023 2220 by Jessica Cruz RN  Outcome: Not Progressing  Flowsheets (Taken 2/13/2023 2220)  Verbalizes/displays adequate comfort level or baseline comfort level:   Encourage patient to monitor pain and request assistance   Assess pain using appropriate pain scale  Note: No complains of pain. Problem: Chronic Conditions and Co-morbidities  Goal: Patient's chronic conditions and co-morbidity symptoms are monitored and maintained or improved  2/13/2023 2220 by Jessica Cruz RN  Outcome: Progressing  Flowsheets (Taken 2/13/2023 2220)  Care Plan - Patient's Chronic Conditions and Co-Morbidity Symptoms are Monitored and Maintained or Improved:   Monitor and assess patient's chronic conditions and comorbid symptoms for stability, deterioration, or improvement   Collaborate with multidisciplinary team to address chronic and comorbid conditions and prevent exacerbation or deterioration   Update acute care plan with appropriate goals if chronic or comorbid symptoms are exacerbated and prevent overall improvement and discharge     Problem: Respiratory - Adult  Goal: Achieves optimal ventilation and oxygenation  2/13/2023 2220 by Jessica Cruz RN  Outcome: Progressing  Flowsheets (Taken 2/13/2023 2220)  Achieves optimal ventilation and oxygenation:   Assess for changes in respiratory status   Assess for changes in mentation and behavior   Position to facilitate oxygenation and minimize respiratory effort   Oxygen supplementation based on oxygen saturation or arterial blood gases  Note: Patient on room air. Maintains saturation above 92%. Kept monitored for shortness of breath. Still has episodes of tachypnea.      Problem: Cardiovascular - Adult  Goal: Absence of cardiac dysrhythmias or at baseline  Outcome: Progressing  Flowsheets (Taken 2/13/2023 2220)  Absence of cardiac dysrhythmias or at baseline:   Monitor cardiac rate and rhythm   Assess for signs of decreased cardiac output  Note: Sinus rhythm on telemetry. Problem: Safety - Adult  Goal: Free from fall injury  2/13/2023 2220 by Elva Lemons RN  Outcome: Progressing  Flowsheets (Taken 2/13/2023 2220)  Free From Fall Injury:   Anita Arnaldo family/caregiver on patient safety   Based on caregiver fall risk screen, instruct family/caregiver to ask for assistance with transferring infant if caregiver noted to have fall risk factors  Note: Fall prevention protocol in place. Call light kept within reach. Calls/Needs attended. Bed alarm ON. Avasys ON. Bed wheels locked and kept in lowest level. Kept reminded to call for help especially when needing to go to bathroom.       Problem: ABCDS Injury Assessment  Goal: Absence of physical injury  Outcome: Progressing  Flowsheets (Taken 2/13/2023 2220)  Absence of Physical Injury: Implement safety measures based on patient assessment

## 2023-02-14 NOTE — PROGRESS NOTES
Internal Medicine Progress Note    Date: 2/14/2023   Patient: [address] Day: 3      CC: Shortness of Breath       Interval Hx   Patient seen and examined at bedside this morning. NAEO. Patient denies any chest pain or SOB. Currently saturating well on RA. Patient is complaining of bilateral lower abdominal pain. He denies any nausea or vomiting and had a large bowel movement this morning. Objective     Vital Signs:  Patient Vitals for the past 8 hrs:   BP Temp Temp src Pulse Resp SpO2   02/14/23 1315 (!) 153/107 -- -- 98 -- 95 %   02/14/23 1135 (!) 161/116 98.8 °F (37.1 °C) Oral 87 20 93 %   02/14/23 1015 -- -- -- 85 15 --   02/14/23 0925 -- -- -- 91 -- --   02/14/23 0913 (!) 144/89 -- -- -- -- --   02/14/23 0750 (!) 179/120 -- -- 97 -- --   02/14/23 0720 (!) 184/121 98.1 °F (36.7 °C) Oral 93 20 95 %   02/14/23 0600 (!) 163/111 -- -- 97 -- --   02/14/23 0554 -- -- -- 96 -- --       Physical Exam  Constitutional:       General: He is not in acute distress. Appearance: He is not toxic-appearing. HENT:      Head: Normocephalic. Mouth/Throat:      Mouth: Mucous membranes are moist.   Eyes:      General: No scleral icterus. Extraocular Movements: Extraocular movements intact. Pupils: Pupils are equal, round, and reactive to light. Cardiovascular:      Rate and Rhythm: Normal rate and regular rhythm. Pulses: Normal pulses. Heart sounds: Normal heart sounds. Pulmonary:      Breath sounds: No stridor. Comments: Slight bilateral rales. Saturating well on RA  Chest:      Chest wall: No tenderness. Abdominal:      General: Bowel sounds are normal.      Palpations: Abdomen is soft. Tenderness: There is abdominal tenderness (Bilateral lower abdominal pain). Comments: No fluid shift, difficult exam given obese abdomen   Musculoskeletal:      Right lower leg: No edema. Left lower leg: No edema. Neurological:      Mental Status: He is alert. Comments: AO x4, flat affect, CARDOZA 5/5.          Labs:  CBC:   Recent Labs     02/12/23  0646 02/13/23  1004 02/14/23  0537   WBC 6.6 5.7 4.9   HGB 12.7* 12.4* 12.4*   HCT 40.6 39.1* 38.6*    174 187       BMP:   Recent Labs     02/12/23  0646 02/13/23  1004 02/14/23  0537    141 138   K 4.0 3.7 4.0   CL 98* 101 98*   CO2 30 31 33*   BUN 21* 20 17   CREATININE 1.4* 1.2 1.0   GLUCOSE 238* 254* 211*   PHOS 5.6* 3.8  --      Magnesium:   Recent Labs     02/12/23  0646 02/13/23  1004 02/14/23  0535   MG 1.70* 1.90 1.90     LFT's:   Recent Labs     02/11/23  1823   AST 42*   *   BILITOT 0.3   ALKPHOS 93         U/A:   Recent Labs     02/12/23  0340   PHUR 6.0       Radiology:  XR CHEST PORTABLE   Final Result   1.  Findings favoring pulmonary edema similar to prior examination.      XR CHEST PORTABLE   Final Result      Cardiomegaly with bilateral interstitial and alveolar pulmonary edema, compatible with congestive heart failure.               Assessment & Plan       Acute Hypoxic Respiratory Failure 2/2 Acute on Chronic HFpEF  SOB, orthopnea worsening over past 2 days. Had big meal chinese food 2 days prior, lots of soy sauce. Last Echo 11/2022 shows EF 50%, mild LVH, indet DD. Wt from last dc (11/2022) at 311, now to 336. CXR this AM similar to prior examination  - Cardio following  - Coreg, hydralazine, Aldactone  - Lasix 40 IV TID  - strict I/Os, daily wts, low Na diet   - Net -4600 since admit    Type II MI was ruled out after study and demand ischemia confirmed  On arrival, trop 0.08 x2, EKG sinus tach, no significant ST-T changes. [facility] 11/2022 normal coronaries. Current smoker (4 cigs/day). CTPA - neg for PE. Trop downtrending  - No further work-up    VAHE, likely cardiorenal, resolved  Cr elev to 1.5, baseline ~1.1. Cr of 1 this AM  - Strict I/Os, daily wts  - avoid nephrotoxic meds    HTN, likely secondary in setting of  Bilateral Adrenal Hyperplasia  On mulitple BP meds, BP still elev, less  likely non-compliance given he resides at facility where meds are given to him. Elevated cortisol levels. Last admission, failure of dexamethasone to suppress. CT adrenals showing diffuse symmetric nodular thickening of B/L adrenals. - BP stable for now  - on Aldactone  - needs outpatient f/u with Endo. Schizoaffective Disorder  From nursing/psych facility  - home Depakote and Seroquel    T2DM  -75 units Lantus BID with 5 units Lispro TID  -HDSSI  -Glucose checks and hypoglycemia protocol      DVT PPx: SQH  Diet: ADULT DIET; Regular; Low Fat/Low Chol/High Fiber/2 gm Na   Code status:  Full Code     Will discuss with attending physician Dr. Ross Santillan MD    _____________________  Joe Boss MD   Internal Medicine Resident, PGY-1    Patient seen and examined on , plan of care discussed with residents.   Agree with their assessment and plan with following addendum:  CHF exacerbation: improving on IV lasix  Possibility of discharge Wednesday       Ross Santillan MD

## 2023-02-14 NOTE — CARE COORDINATION
JUANPABLO received call from blanka Miles, stating that she would like a referral sent to [facility] for LTC transfer. CM sent referral to fax [phone]. CM explained that if [facility] can accept pt when pt is medically ready for DC, we can DC pt from hospital to [facility]. Otherwise, pt will have to DC back to [address] [place] [address] Avenue will be responsible to follow up with [facility] and make that transition. Elliot Miles verbalized understanding.     Electronically signed by Mikel Myles RN, BSN, CM on 2/14/2023 at 4:05 PM

## 2023-02-15 VITALS
DIASTOLIC BLOOD PRESSURE: 105 MMHG | HEART RATE: 96 BPM | WEIGHT: 315 LBS | TEMPERATURE: 97.7 F | BODY MASS INDEX: 41.75 KG/M2 | RESPIRATION RATE: 18 BRPM | SYSTOLIC BLOOD PRESSURE: 162 MMHG | OXYGEN SATURATION: 97 % | HEIGHT: 73 IN

## 2023-02-15 LAB
ANION GAP SERPL CALCULATED.3IONS-SCNC: 7 MMOL/L (ref 3–16)
BASOPHILS ABSOLUTE: 0 K/UL (ref 0–0.2)
BASOPHILS RELATIVE PERCENT: 0.4 %
BUN BLDV-MCNC: 21 MG/DL (ref 7–20)
CALCIUM SERPL-MCNC: 9.2 MG/DL (ref 8.3–10.6)
CHLORIDE BLD-SCNC: 95 MMOL/L (ref 99–110)
CO2: 33 MMOL/L (ref 21–32)
CREAT SERPL-MCNC: 1.2 MG/DL (ref 0.9–1.3)
EOSINOPHILS ABSOLUTE: 0 K/UL (ref 0–0.6)
EOSINOPHILS RELATIVE PERCENT: 0.1 %
GFR SERPL CREATININE-BSD FRML MDRD: >60 ML/MIN/{1.73_M2}
GLUCOSE BLD-MCNC: 269 MG/DL (ref 70–99)
GLUCOSE BLD-MCNC: 288 MG/DL (ref 70–99)
GLUCOSE BLD-MCNC: 343 MG/DL (ref 70–99)
GLUCOSE BLD-MCNC: 344 MG/DL (ref 70–99)
GLUCOSE BLD-MCNC: 493 MG/DL (ref 70–99)
HCT VFR BLD CALC: 39.7 % (ref 40.5–52.5)
HEMOGLOBIN: 12.6 G/DL (ref 13.5–17.5)
LYMPHOCYTES ABSOLUTE: 1.4 K/UL (ref 1–5.1)
LYMPHOCYTES RELATIVE PERCENT: 28.7 %
MAGNESIUM: 1.8 MG/DL (ref 1.8–2.4)
MCH RBC QN AUTO: 26.9 PG (ref 26–34)
MCHC RBC AUTO-ENTMCNC: 31.8 G/DL (ref 31–36)
MCV RBC AUTO: 84.5 FL (ref 80–100)
MONOCYTES ABSOLUTE: 0.8 K/UL (ref 0–1.3)
MONOCYTES RELATIVE PERCENT: 16.6 %
NEUTROPHILS ABSOLUTE: 2.7 K/UL (ref 1.7–7.7)
NEUTROPHILS RELATIVE PERCENT: 54.2 %
PDW BLD-RTO: 14.8 % (ref 12.4–15.4)
PERFORMED ON: ABNORMAL
PLATELET # BLD: 193 K/UL (ref 135–450)
PMV BLD AUTO: 9.9 FL (ref 5–10.5)
POTASSIUM REFLEX MAGNESIUM: 4.1 MMOL/L (ref 3.5–5.1)
PRO-BNP: 2110 PG/ML (ref 0–124)
RBC # BLD: 4.69 M/UL (ref 4.2–5.9)
SARS-COV-2, NAAT: NOT DETECTED
SODIUM BLD-SCNC: 135 MMOL/L (ref 136–145)
WBC # BLD: 5.1 K/UL (ref 4–11)

## 2023-02-15 PROCEDURE — 6370000000 HC RX 637 (ALT 250 FOR IP)

## 2023-02-15 PROCEDURE — 83735 ASSAY OF MAGNESIUM: CPT

## 2023-02-15 PROCEDURE — 6360000002 HC RX W HCPCS: Performed by: STUDENT IN AN ORGANIZED HEALTH CARE EDUCATION/TRAINING PROGRAM

## 2023-02-15 PROCEDURE — 80048 BASIC METABOLIC PNL TOTAL CA: CPT

## 2023-02-15 PROCEDURE — 2580000003 HC RX 258: Performed by: STUDENT IN AN ORGANIZED HEALTH CARE EDUCATION/TRAINING PROGRAM

## 2023-02-15 PROCEDURE — 83880 ASSAY OF NATRIURETIC PEPTIDE: CPT

## 2023-02-15 PROCEDURE — 36415 COLL VENOUS BLD VENIPUNCTURE: CPT

## 2023-02-15 PROCEDURE — 6370000000 HC RX 637 (ALT 250 FOR IP): Performed by: STUDENT IN AN ORGANIZED HEALTH CARE EDUCATION/TRAINING PROGRAM

## 2023-02-15 PROCEDURE — 6370000000 HC RX 637 (ALT 250 FOR IP): Performed by: INTERNAL MEDICINE

## 2023-02-15 PROCEDURE — 87635 SARS-COV-2 COVID-19 AMP PRB: CPT

## 2023-02-15 PROCEDURE — 2500000003 HC RX 250 WO HCPCS: Performed by: STUDENT IN AN ORGANIZED HEALTH CARE EDUCATION/TRAINING PROGRAM

## 2023-02-15 PROCEDURE — 85025 COMPLETE CBC W/AUTO DIFF WBC: CPT

## 2023-02-15 RX ORDER — FUROSEMIDE 40 MG/1
40 TABLET ORAL DAILY
Qty: 30 TABLET | Refills: 3 | Status: SHIPPED | OUTPATIENT
Start: 2023-02-15 | End: 2023-03-17

## 2023-02-15 RX ORDER — HYDRALAZINE HYDROCHLORIDE 50 MG/1
75 TABLET, FILM COATED ORAL 3 TIMES DAILY
Qty: 90 TABLET | Refills: 3 | Status: SHIPPED | OUTPATIENT
Start: 2023-02-15

## 2023-02-15 RX ORDER — INSULIN LISPRO 100 [IU]/ML
15 INJECTION, SOLUTION INTRAVENOUS; SUBCUTANEOUS
Status: DISCONTINUED | OUTPATIENT
Start: 2023-02-15 | End: 2023-02-15 | Stop reason: HOSPADM

## 2023-02-15 RX ORDER — CARVEDILOL 25 MG/1
50 TABLET ORAL 2 TIMES DAILY WITH MEALS
Qty: 60 TABLET | Refills: 3 | Status: SHIPPED | OUTPATIENT
Start: 2023-02-15

## 2023-02-15 RX ADMIN — HEPARIN SODIUM 5000 UNITS: 5000 INJECTION INTRAVENOUS; SUBCUTANEOUS at 13:34

## 2023-02-15 RX ADMIN — INSULIN LISPRO 15 UNITS: 100 INJECTION, SOLUTION INTRAVENOUS; SUBCUTANEOUS at 13:10

## 2023-02-15 RX ADMIN — HYDRALAZINE HYDROCHLORIDE 50 MG: 50 TABLET, FILM COATED ORAL at 08:25

## 2023-02-15 RX ADMIN — QUETIAPINE FUMARATE 200 MG: 200 TABLET ORAL at 13:34

## 2023-02-15 RX ADMIN — DIVALPROEX SODIUM 500 MG: 125 CAPSULE ORAL at 13:32

## 2023-02-15 RX ADMIN — FUROSEMIDE 40 MG: 40 TABLET ORAL at 16:40

## 2023-02-15 RX ADMIN — HYDRALAZINE HYDROCHLORIDE 50 MG: 50 TABLET, FILM COATED ORAL at 13:32

## 2023-02-15 RX ADMIN — HEPARIN SODIUM 5000 UNITS: 5000 INJECTION INTRAVENOUS; SUBCUTANEOUS at 05:42

## 2023-02-15 RX ADMIN — QUETIAPINE FUMARATE 200 MG: 200 TABLET ORAL at 08:25

## 2023-02-15 RX ADMIN — INSULIN GLARGINE 75 UNITS: 100 INJECTION, SOLUTION SUBCUTANEOUS at 09:15

## 2023-02-15 RX ADMIN — INSULIN LISPRO 12 UNITS: 100 INJECTION, SOLUTION INTRAVENOUS; SUBCUTANEOUS at 13:11

## 2023-02-15 RX ADMIN — INSULIN LISPRO 8 UNITS: 100 INJECTION, SOLUTION INTRAVENOUS; SUBCUTANEOUS at 09:14

## 2023-02-15 RX ADMIN — SPIRONOLACTONE 50 MG: 50 TABLET ORAL at 08:25

## 2023-02-15 RX ADMIN — DIVALPROEX SODIUM 500 MG: 125 CAPSULE ORAL at 08:31

## 2023-02-15 RX ADMIN — SODIUM CHLORIDE, PRESERVATIVE FREE 10 ML: 5 INJECTION INTRAVENOUS at 08:28

## 2023-02-15 RX ADMIN — CARVEDILOL 50 MG: 25 TABLET, FILM COATED ORAL at 08:25

## 2023-02-15 RX ADMIN — FUROSEMIDE 40 MG: 40 TABLET ORAL at 08:25

## 2023-02-15 RX ADMIN — PANTOPRAZOLE SODIUM 40 MG: 40 TABLET, DELAYED RELEASE ORAL at 05:42

## 2023-02-15 RX ADMIN — CARVEDILOL 50 MG: 25 TABLET, FILM COATED ORAL at 16:40

## 2023-02-15 RX ADMIN — INSULIN LISPRO 15 UNITS: 100 INJECTION, SOLUTION INTRAVENOUS; SUBCUTANEOUS at 09:14

## 2023-02-15 RX ADMIN — ASPIRIN 81 MG 81 MG: 81 TABLET ORAL at 08:24

## 2023-02-15 RX ADMIN — QUETIAPINE FUMARATE 200 MG: 200 TABLET ORAL at 16:40

## 2023-02-15 RX ADMIN — LABETALOL HYDROCHLORIDE 5 MG: 5 INJECTION, SOLUTION INTRAVENOUS at 04:24

## 2023-02-15 ASSESSMENT — PAIN SCALES - GENERAL
PAINLEVEL_OUTOF10: 0

## 2023-02-15 NOTE — PLAN OF CARE
Problem: Chronic Conditions and Co-morbidities  Goal: Patient's chronic conditions and co-morbidity symptoms are monitored and maintained or improved  2/15/2023 1833 by Javon Delacruz RN  Outcome: Completed  2/15/2023 1111 by Javon Delacruz RN  Outcome: Progressing  Flowsheets (Taken 2/15/2023 1111)  Care Plan - Patient's Chronic Conditions and Co-Morbidity Symptoms are Monitored and Maintained or Improved:   Monitor and assess patient's chronic conditions and comorbid symptoms for stability, deterioration, or improvement   Collaborate with multidisciplinary team to address chronic and comorbid conditions and prevent exacerbation or deterioration   Update acute care plan with appropriate goals if chronic or comorbid symptoms are exacerbated and prevent overall improvement and discharge  Note: Pt has elevated pressures and blood sugars. Monitoring BP and blood sugars. HR SR on continuous telemetry. Problem: Respiratory - Adult  Goal: Achieves optimal ventilation and oxygenation  Outcome: Completed     Problem: Pain  Goal: Verbalizes/displays adequate comfort level or baseline comfort level  2/15/2023 1111 by Javon Delacruz RN  Outcome: Progressing  Flowsheets (Taken 2/15/2023 1111)  Verbalizes/displays adequate comfort level or baseline comfort level:   Encourage patient to monitor pain and request assistance   Assess pain using appropriate pain scale   Administer analgesics based on type and severity of pain and evaluate response   Implement non-pharmacological measures as appropriate and evaluate response  Note: Pt able to communicate pain. Pt denies pain at this time. Repositioning and environmental changes utilized to manage pain.        Problem: Discharge Planning  Goal: Discharge to home or other facility with appropriate resources  2/15/2023 1111 by Javon Delacruz RN  Outcome: Progressing  Flowsheets (Taken 2/15/2023 1111)  Discharge to home or other facility with appropriate resources:   Identify barriers to discharge with patient and caregiver   Arrange for needed discharge resources and transportation as appropriate   Identify discharge learning needs (meds, wound care, etc)  Note: Pt transitioned to PO Lasix. Monitoring blood sugar and blood pressure Q4 and PRN.

## 2023-02-15 NOTE — DISCHARGE SUMMARY
INTERNAL MEDICINE DEPARTMENT AT [address] [place] [address]  DISCHARGE SUMMARY    Patient ID: Miguelina Kahn                                             Discharge Date: 2/15/2023   Patient's PCP: Veto Heimlich, MD                                          Discharge Physician: Lam Steven MD  Admit Date: 2/11/2023   Admitting Physician: Issa Hinds MD    DISCHARGE DIAGNOSES:  - Acute Hypoxic Respiratory Failure 2/2 Acute on Chronic HFpEF  - VAHE, likely cardiorenal  - HTN, likely secondary to Bilateral Adrenal Hyperplasia    Hospital Course:      Miguelina Kahn is a 38 y.o. male with PMHx of HTN, HFpEF (50%, 2022), cognitive impairment, schizoaffective disorder, T2DM who presented to the ED with complaints of chest pain and SOB for the last 2 days prior to presentation. Patient also complained of associated bilateral LE edema. Patient stated that he ate [place] food for dinner after which he began having worsening SOB the next day and felt like his weight was increasing and legs were becoming more swollen. In the ED, patient's creatinine and BNP were elevated and CXR showed cardiomegaly with bilateral interstitial and alveolar pulmonary edema, compatible with congestive heart failure. Patient was admitted for managemnet of CHF exacerbation likely 2/2 to dietary non-compliance and VAHE likely cardio-renal 2/2 to CHF exacerbation. Cardiology was consulted and patient was treated with IV lasix. Patient's symptoms improved and was stable for discharge. Patient will be discharged on Lasix 40mg BID and will need follow-up with PCP and cardiology within 1 week. Throughout this admission, patient also had persistently high BP which was treated with continuing his home BP regimen as well as PRN labetalol and hydralazine. CT from prior admission sigh adrenals showing diffuse symmetric nodular thickening of B/L adrenals.  HTN thought to be 2/2 to bilateral adrenal hyperplasia for which patient will require outpatient follow-up with endocrinology. On day of discharge, patient denied any headaches, lightheadedness/dizziness, cough, shortness of breath, chest pain, vomiting, abdominal pain, diarrhea or constipation. Patient's status improved. Patient was stable for discharge and agreeable to the plan. Please follow-up as outlined. Physical Exam:  BP (!) 167/112   Pulse 95   Temp 97.6 °F (36.4 °C) (Oral)   Resp 18   Ht 6' 1\" (1.854 m)   Wt (!) 317 lb 0.3 oz (143.8 kg)   SpO2 95%   BMI 41.83 kg/m²   General appearance: alert, appears stated age and cooperative  Head: Normocephalic, without obvious abnormality, atraumatic  Eyes: conjunctivae/corneas clear. PERRL, EOM's intact. Fundi benign. Ears: normal TM's and external ear canals both ears  Nose: Nares normal. Septum midline. Mucosa normal. No drainage or sinus tenderness.   Throat: lips, mucosa, and tongue normal; teeth and gums normal  Neck: no adenopathy, no carotid bruit, no JVD, supple, symmetrical, trachea midline and thyroid not enlarged, symmetric, no tenderness/mass/nodules  Lungs: clear to auscultation bilaterally  Heart: regular rate and rhythm, S1, S2 normal, no murmur, click, rub or gallop  Abdomen: soft, non-tender; bowel sounds normal; no masses,  no organomegaly  Extremities: extremities normal, atraumatic, no cyanosis or edema  Neurologic: Grossly normal    Consults:  IP CONSULT TO HOSPITALIST  IP CONSULT TO PHARMACY  IP CONSULT TO CARDIOLOGY    Disposition: SNF  Discharged Condition: Stable  Follow Up: Primary Care Physician, cardiology and endocrinology in one week    DISCHARGE MEDICATION:     Medication List        CHANGE how you take these medications      furosemide 40 MG tablet  Commonly known as: LASIX  Take 1 tablet by mouth daily  What changed:   medication strength  how much to take  Notes to patient: Furosemide  (Lasix)  USE--  Helps body get rid of extra fluid; \"water pill\"  SIDE EFFECTS--  Increased urination, dizziness     insulin glargine 100 UNIT/ML injection pen  Commonly known as: LANTUS;BASAGLAR  Inject 80 Units into the skin in the morning and at bedtime  What changed: how much to take  Notes to patient: Insulin   USE--  Treatment of diabetes or high blood sugar  SIDE EFFECTS--  Low blood sugar, irritation at injection site            CONTINUE taking these medications      acetaminophen 325 MG tablet  Commonly known as: TYLENOL     aspirin 81 MG chewable tablet     atorvastatin 80 MG tablet  Commonly known as: LIPITOR  Take 1 tablet by mouth nightly     benztropine 1 MG tablet  Commonly known as: COGENTIN     carvedilol 25 MG tablet  Commonly known as: COREG  Take 2 tablets by mouth 2 times daily (with meals)     divalproex 125 MG DR capsule  Commonly known as: DEPAKOTE SPRINKLE     fenofibrate micronized 200 MG capsule  Commonly known as: LOFIBRA     ferrous gluconate 324 (38 Fe) MG tablet  Commonly known as: FERGON     haloperidol 20 MG tablet  Commonly known as: HALDOL     hydrALAZINE 50 MG tablet  Commonly known as: APRESOLINE     hydrOXYzine HCl 25 MG tablet  Commonly known as: ATARAX     insulin lispro (1 Unit Dial) 100 UNIT/ML Sopn  Commonly known as: HUMALOG/ADMELOG  Inject 15 Units into the skin 3 times daily (with meals)     metFORMIN 1000 MG tablet  Commonly known as: GLUCOPHAGE     NovoLOG FlexPen 100 UNIT/ML injection pen  Generic drug: insulin aspart     omeprazole 20 MG delayed release capsule  Commonly known as: PRILOSEC     QUEtiapine 200 MG tablet  Commonly known as: SEROQUEL     senna 8.6 MG tablet  Commonly known as: SENOKOT     spironolactone 50 MG tablet  Commonly known as: ALDACTONE     Trulicity 8.43 IV/4.5RJ Sopn  Generic drug: Dulaglutide     vitamin B-12 1000 MCG tablet  Commonly known as: CYANOCOBALAMIN            STOP taking these medications      pantoprazole sodium 40 MG Pack packet  Commonly known as: PROTONIX               Where to Get Your Medications        These medications were sent to [facility] [place] [id number] [place] [phone] - F [phone]  [address] [facility] [address]      Phone: [phone]   carvedilol 25 MG tablet  furosemide 40 MG tablet  insulin glargine 100 UNIT/ML injection pen       Activity: activity as tolerated  Diet: cardiac diet  Wound Care: none needed    Time Spent on discharge is more than 30 minutes    Signed:  Stevenson Clarke MD,  PGY1  2/15/2023      Patient seen and examined , plan of care discussed with residents. Agree with their assessment and plan with following addendum:  Admitted for CHF. Diuresed well. Lasix increased to BID. Patient is stable for discharge and cleared by cardiology. Time spent 35 minutes.        Morena Trejo MD

## 2023-02-15 NOTE — CARE COORDINATION
Case Management Assessment            Discharge Note                    Date / Time of Note: 2/15/2023 10:04 AM                  Discharge Note Completed by: Mira Booker    Patient Name: Evelia Duarte   YOB: 1980  Diagnosis: Acute on chronic combined systolic and diastolic heart failure ([address] [I50.43]  NSTEMI (non-ST elevated myocardial infarction) ([address] [I21.4]  Acute respiratory failure with hypoxia ([address] [J96.01]  Acute on chronic respiratory failure with hypoxemia ([address] [J96.21]  Acute on chronic congestive heart failure, unspecified heart failure type ([address] [I50.9]   Date / Time: 2/11/2023  6:02 PM    Current PCP: Sha Camacho MD  Clinic patient: No    Hospitalization in the last 30 days: No    Advance Directives:  Code Status: Full Code  [place] DNR form completed and on chart: Not Indicated    Financial:  Payor: Willian Munguia / Plan: Yon Jackson / Product Type: *No Product type* /      Pharmacy:    [address] [place]. [facility] [phone] - F [phone]  [facility] [place] [address]  Phone: [phone] Fax: [phone]      Assistance purchasing medications?: Potential Assistance Purchasing Medications: No  Assistance provided by Case Management: None at this time    Does patient want to participate in local refill/ meds to beds program?: No    Meds To Beds General Rules:  1. Can ONLY be done Monday- Friday between 8:30am-5pm  2. Prescription(s) must be in pharmacy by 3pm to be filled same day  3. Copy of patient's insurance/ prescription drug card and patient face sheet must be sent along with the prescription(s)  4. Cost of Rx cannot be added to hospital bill. If financial assistance is needed, please contact unit  or ;  or  CANNOT provide pharmacy voucher for patients co-pays  5.  Patients can then  the prescription on their way out of the hospital at discharge, or pharmacy can deliver to the bedside if staff is available. (payment due at time of pick-up or delivery - cash, check, or card accepted)     Able to afford home medications/ co-pay costs: Yes    ADLS:  Current PT AM-PAC Score:   /24  Current OT AM-PAC Score:   /24      DISCHARGE Disposition: [facility] (LTC): [facility]  Phone: [phone]  Fax: [phone] & [phone]    LOC at discharge: [address] Completed: Yes    Notification completed in HENS/PAS?:  Not Applicable    IMM Completed:   No VM left for Legal guardian, no call back at this time.     ADDENDUM: IMM completed 2/15 at [address]:  Transportation PLAN for discharge: EMS transportation   Mode of Transport: Ambulance stretcher - BLS  Reason for medical transport: Other: Cognitive impairment; schizoaffective disorder; and Severe muscular weakness and de-conditioned state due to COPD/CHF and requires ambulance transport due to high fall risk  Name of Transport Company:  [employer]   Phone: [phone]  Time of Transport: [address] [place] [address] form completed: Yes    Home Care:  [address] ordered at discharge: Not [address]: Not Applicable  Orders faxed:     Durable Medical Equipment:  DME Provider: defer to David Balbuena obtained during hospitalization:     Home Oxygen and Respiratory Equipment:  Oxygen needed at discharge?: No  [address]:   Portable tank available for discharge?:     Dialysis:  Dialysis patient: No    Dialysis Center:  Not Applicable    Hospice Services:  Location: Not Applicable  Agency: Not Applicable    Consents signed: Not Indicated    Referrals made at [facility] for outpatient continued care:  Not Applicable    Additional CM Notes:     CM left message with Odilia in admissions at [facility] ([phone]) regarding referral, CM has not received call back at this time, therefore CM scheduled german transport back to previous LTC facility [facility] via [employer] for 1830. CM made Misbah Carney in admissions aware, LVM with guardian, Magdiel Oneal, to make aware of transport time and give contact info for Odilia at [facility], and made bedside RN aware. ADDENDUM 1200: CM received call from admissions at [facility]- they are able to accept pt today. CM was able to change transport destination with Kvng Gautam at [facility]) via phone. CM called to inform Misbah Carney at [facility]- VM left. CM left Brenda TOSHA and admissions at [facility] aware of transport time of 1830. ADDENDUM 1254: Magdiel Oneal called CM, agreeable to DCP to [facility]. IMM obtained. ADDENDUM 1640: CM received call from Jaú at [facility] stating that Level 2 PASSR determination has not come back yet. They will not be able to take pt without this determination. CM called [facility] contact in an attempt to escalate- unsuccessful. Ultimately pt will have to [facility] determination results. CM confirmed with Mitra Bryson at [facility]. Confirmed DCP with Magdiel Oneal- agreeable. DC location switched back to [facility] via phone with Kvng Gautam at [facility]. The Plan for Transition of Care is related to the following treatment goals of Acute on chronic combined systolic and diastolic heart failure (HCC) [I50.43]  NSTEMI (non-ST elevated myocardial infarction) [address] [I21.4]  Acute respiratory failure with hypoxia (HCC) [J96.01]  Acute on chronic respiratory failure with hypoxemia (HCC) [J96.21]  Acute on chronic congestive heart failure, unspecified heart failure type ([address] [I50.9]    The Patient and/or patient representative Constantin Bolaños and his family were provided with a choice of provider and agrees with the discharge plan Yes    Freedom of choice list was provided with basic dialogue that supports the patient's individualized plan of care/goals and shares the quality data associated with the providers.  Yes    Care Transitions patient: No    Perlita Rinaldi  [facility]  Case Management Department  Ph: [phone]

## 2023-02-15 NOTE — PLAN OF CARE
Patient refused this early morning labs, but agreed to be taken later in the morning shift. Maintains on room air, saturating >92%. 4 AM blood pressure systolic is above 196. PRN labetalol given. Problem: Discharge Planning  Goal: Discharge to home or other facility with appropriate resources  Outcome: Progressing  Flowsheets (Taken 2/14/2023 2301)  Discharge to home or other facility with appropriate resources: Identify barriers to discharge with patient and caregiver     Problem: Pain  Goal: Verbalizes/displays adequate comfort level or baseline comfort level  2/14/2023 2301 by Leeroy Jacobsen RN  Outcome: Progressing  Flowsheets (Taken 2/14/2023 2301)  Verbalizes/displays adequate comfort level or baseline comfort level: Encourage patient to monitor pain and request assistance  Note: No complains of pain     Problem: Chronic Conditions and Co-morbidities  Goal: Patient's chronic conditions and co-morbidity symptoms are monitored and maintained or improved  2/14/2023 2301 by Leeroy Jacobsen RN  Outcome: Progressing  Flowsheets (Taken 2/14/2023 2301)  Care Plan - Patient's Chronic Conditions and Co-Morbidity Symptoms are Monitored and Maintained or Improved:   Monitor and assess patient's chronic conditions and comorbid symptoms for stability, deterioration, or improvement   Collaborate with multidisciplinary team to address chronic and comorbid conditions and prevent exacerbation or deterioration   Update acute care plan with appropriate goals if chronic or comorbid symptoms are exacerbated and prevent overall improvement and discharge  Note: Maintains saturation >92% on room air.  Still having tachypnea when walking and on exertion     Problem: Respiratory - Adult  Goal: Achieves optimal ventilation and oxygenation  Outcome: Progressing  Flowsheets (Taken 2/14/2023 2301)  Achieves optimal ventilation and oxygenation:   Assess for changes in respiratory status   Assess for changes in mentation and behavior   Assess and instruct to report shortness of breath or any respiratory difficulty  Note: Patient able to report if short of breath. On room air     Problem: Safety - Adult  Goal: Free from fall injury  2/14/2023 2301 by Ivana Loza RN  Outcome: Progressing  Flowsheets (Taken 2/14/2023 2301)  Free From Fall Injury: Instruct family/caregiver on patient safety  Note: Fall prevention protocol in place. Call light kept within reach. Calls/needs attended. Bed alarm turned ON. Bed wheels locked and kept in lowest level. Room clutters removed.      Problem: ABCDS Injury Assessment  Goal: Absence of physical injury  Outcome: Progressing  Flowsheets (Taken 2/14/2023 2301)  Absence of Physical Injury: Implement safety measures based on patient assessment

## 2023-02-15 NOTE — PROGRESS NOTES
Cardiology Consult Service  Daily Progress Note        Admit Date:  2/11/2023  Primary cardiologist: Dr Marisa Gallegos    Reason for Consultation/Chief Complaint: AHF    Subjective:     Patient is a 37 yo man with cognitive disability (currently living at [address]), COPD on 2 liters oxygen, asthma, HTN, HFrEF and poorly controlled DM and HLP. Echo 07/2022: mild LVD, mild to mod LVH, EF 45-50%, basal inferior and inferoseptal HK. LVEF similar to echo from 2020. LHC 11/2022: Normal coronaries. Home diuretics: Lasix 40 mg daily and spironolactone 50 mg daily. Patient presented to the emergency room on 2/11 with progressively worse shortness of breath, chest pain, leg swelling over the last couple of days. Has been compliant with his medications however admits to excess salt in his diet. Patient was admitted for acute heart failure and hypertensive urgency (/120 mmHg). Dr. Mayte Man was consulted during the weekend to assess the patient. He consulted me today to assume care for acute heart failure. G consistent with sinus tachycardia, nonspecific changes. Chest x-ray consistent with pulmonary edema. Interval history:  Patient reports improved symptoms. He is now off supplemental oxygen. Cr improved 1.0.      Objective:     Medications:   hydrALAZINE  50 mg Oral TID    pantoprazole  40 mg Oral QAM AC    insulin lispro  10 Units SubCUTAneous TID WC    furosemide  40 mg IntraVENous TID    insulin glargine  75 Units SubCUTAneous BID    aspirin  81 mg Oral Daily    atorvastatin  80 mg Oral Nightly    carvedilol  50 mg Oral BID WC    QUEtiapine  200 mg Oral 4x Daily    spironolactone  50 mg Oral Daily    divalproex  500 mg Oral TID    insulin lispro  0-16 Units SubCUTAneous TID WC    insulin lispro  0-4 Units SubCUTAneous Nightly    sodium chloride flush  5-40 mL IntraVENous 2 times per day    heparin (porcine)  5,000 Units SubCUTAneous 3 times per day       IV drips:   dextrose      sodium chloride      dextrose         PRN:  perflutren lipid microspheres, glucose, dextrose bolus **OR** dextrose bolus, glucagon (rDNA), dextrose, labetalol, hydrALAZINE, sodium chloride flush, sodium chloride, ondansetron **OR** ondansetron, polyethylene glycol, acetaminophen **OR** acetaminophen, glucose, dextrose bolus **OR** dextrose bolus, glucagon (rDNA), dextrose    Vitals:    02/14/23 1800 02/14/23 1830 02/14/23 1845 02/14/23 1933   BP: (!) 177/112 (!) 162/101 (!) 152/82 (!) 149/96   Pulse: 99 100  92   Resp:    21   Temp:    97.5 °F (36.4 °C)   TempSrc:    Oral   SpO2:    93%   Weight:       Height:           Intake/Output Summary (Last 24 hours) at 2/14/2023 2211  Last data filed at 2/14/2023 1845  Gross per 24 hour   Intake 1670 ml   Output 1750 ml   Net -80 ml     I/O last 3 completed shifts: In: 9674 [P.O.:3200; I.V.:10]  Out: 4650 [Urine:4650]  Wt Readings from Last 3 Encounters:   02/14/23 297 lb 6.4 oz (134.9 kg)   12/29/22 (!) 320 lb (145.2 kg)   12/12/22 (!) 322 lb 3.2 oz (146.1 kg)       Admit Wt: Weight: (!) 340 lb (154.2 kg)   Todays Wt: Weight: 297 lb 6.4 oz (134.9 kg)    TELEMETRY personally reviewed: NSR    Physical Exam:         General Appearance:  Alert, cooperative, no distress, appears stated age Appropriate weight   Head:  Normocephalic, without obvious abnormality, atraumatic   Eyes:  PERRL, conjunctiva/corneas clear EOM intact  Ears normal   Throat no lesions       Nose: Nares normal, no drainage or sinus tenderness   Throat: Lips, mucosa, and tongue normal   Neck: Supple, symmetrical, trachea midline, no adenopathy, thyroid: not enlarged, symmetric, no tenderness/mass/nodules, no carotid bruit. Lungs:   Normal respiratory rate, lungs clear to auscultation without any wheezes, rubs or ronchi bilaterally.     Chest Wall:  No tenderness or deformity   Heart:  Regular rhythm, rate is controlled, S1, S2 normal, there is no murmur, there is no rub or gallop, cannot assess jvd, no bilateral lower extremity edema   Abdomen:   Soft, non-tender, bowel sounds active all four quadrants,  no masses, no organomegaly       Extremities: Extremities normal, atraumatic, no cyanosis. Pulses: 2+ and symmetric   Skin: Skin color, texture, turgor normal, no rashes or lesions   Pysch: Normal mood and affect   Neurologic: Normal gross motor and sensory exam.  Cranial nerves intact       Labs:   Recent Labs     02/12/23  0646 02/13/23  1004 02/14/23  0535 02/14/23  0537    141  --  138   K 4.0 3.7  --  4.0   BUN 21* 20  --  17   CREATININE 1.4* 1.2  --  1.0   CL 98* 101  --  98*   CO2 30 31  --  33*   GLUCOSE 238* 254*  --  211*   CALCIUM 9.3 8.9  --  9.1   MG 1.70* 1.90 1.90  --      Recent Labs     02/12/23  0646 02/13/23  1004 02/14/23  0537   WBC 6.6 5.7 4.9   HGB 12.7* 12.4* 12.4*   HCT 40.6 39.1* 38.6*    174 187   MCV 84.9 85.2 84.6     No results for input(s): CHOLTOT, TRIG, HDL, CHOLHDL, LDL in the last 72 hours. Invalid input(s): Cassie Hagan  No results for input(s): PTT, INR in the last 72 hours. Invalid input(s): PT  Recent Labs     02/12/23  0114 02/12/23  0646   TROPONINI 0.07* 0.07*     No results for input(s): BNP in the last 72 hours. No results for input(s): NTPROBNP in the last 72 hours. No results for input(s): TSH in the last 72 hours. Imaging:       Assessment & Plan:     1. Acute on chronic HFrEF. Patient was volume overloaded and severely hypertensive. It was due to poor compliance with low-salt diet. His systolic heart failure is nonischemic in etiology (hypertension induced) in view of normal coronaries on [facility]. He is now euvolemic. 2.  Hypertensive urgency. 3.  Cognitive impairment  4. COPD on 2 L of oxygen at baseline           -Will change IV lasix to lasix 40 po bid (was daily at home but patient poorly compliant with low salt diet) tomorrow.    - Continue with Coreg 50 mg p.o. twice daily, hydralazine 25 mg every 8 hours, spironolactone 50 mg daily.  - Strict I's and O's every shift and standing weights if possible, low-salt diet, daily BMP with reflex to Mg (correct lytes for goals K >4.0 and Mg > 2.0) and wean supplemental oxygen to off (or down to baseline supplemental oxygen requirements) for sats greater than 92-94%. Patient may be discharged to home or facility (if ok with primary team) tomorrow on the above meds and follow up with me in 1 week with a BMP prior to visit. Please call me with any questions. I have personally reviewed the reports and images of labs, radiological studies, cardiac studies including ECG's and telemetry, current and old medical records. The note was completed using EMR and Dragon dictation system. Every effort was made to ensure accuracy; however, inadvertent computerized transcription errors may be present. All questions and concerns were addressed to the patient/family. Alternatives to my treatment were discussed. Thank you for allowing to us to participate in the care or [place]. Please call our service with questions.     Avril Ca MD, [facility] [place], [address]  The [address]  [address] [place] [address]  Ph: [phone]  Fax: [phone]

## 2023-02-15 NOTE — DISCHARGE INSTR - COC
Continuity of Care Form    Patient Name: Lona Villagomez   :  1980  MRN:  [id number]    Admit date:  2023  Discharge date:  2/15/2023    Code Status Order: Full Code   Advance Directives:     Admitting Physician:  April Keane MD  PCP: Fernando Srinivasan MD    Discharging Nurse: Susan Walsh RN  [address] Unit/Room#: 1030/9213-90  Discharging Unit Phone Number: [phone]    Emergency Contact:   Extended Emergency Contact Information  Primary Emergency Contact: [address] Phone: [phone]  Mobile Phone: [phone]  Relation: Legal Guardian  Secondary Emergency Contact: [address] [facility] Phone: [phone]  Relation: Legal Guardian    Past Surgical History:  No past surgical history on file. Immunization History: There is no immunization history on file for this patient.     Active Problems:  Patient Active Problem List   Diagnosis Code    Chest pain R07.9    Elevated troponin R77.8    Hyperglycemia R73.9    Morbid obesity (HCC) E66.01    Electrolyte imbalance E87.8    Cushingoid facies R68.89    Acute on chronic respiratory failure with hypoxemia ([facility]) J96.21    Acute on chronic combined systolic and diastolic heart failure (HCC) I50.43    Acute kidney injury superimposed on CKD ([facility] [address] N17.9, N18.9    Acute on chronic congestive heart failure ([facility] [address] I50.9    Essential hypertension I10       Isolation/Infection:   Isolation            No Isolation          Patient Infection Status       None to display            Nurse Assessment:  Last Vital Signs: BP (!) 174/114   Pulse 86   Temp 98 °F (36.7 °C) (Axillary)   Resp 18   Ht 5' 11\" (1.803 m)   Wt (!) 315 lb 14.7 oz (143.3 kg)   SpO2 92%   BMI 44.06 kg/m²     Last documented pain score (0-10 scale): Pain Level: 0  Last Weight:   Wt Readings from Last 1 Encounters:   02/15/23 (!) 315 lb 14.7 oz (143.3 kg)     Mental Status:  alert    IV Access:  - None    Nursing Mobility/ADLs:  Walking   Independent  Transfer Independent  Bathing  Independent  Dressing  Independent  Toileting  Independent  Feeding  Independent  Med [address]  Independent  Med Delivery   whole    Wound Care Documentation and Therapy:        Elimination:  Continence: Bowel: Yes  Bladder: Yes  Urinary Catheter: None   Colostomy/Ileostomy/Ileal Conduit: No       Date of Last BM: 2/14/2023    Intake/Output Summary (Last 24 hours) at 2/15/2023 0954  Last data filed at 2/15/2023 0546  Gross per 24 hour   Intake 1780 ml   Output 2450 ml   Net -670 ml     I/O last 3 completed shifts: In: [address] [place] [P.O.:2600]  Out: 4000 [Urine:4000]    Safety Concerns:     None    Impairments/Disabilities:      None    Nutrition Therapy:  Current Nutrition Therapy: Reg Carb Control    Routes of Feeding: Oral  Liquids: Thin Liquids  Daily Fluid Restriction: no  Last Modified Barium Swallow with Video (Video Swallowing Test): not done    Treatments at the Time of Hospital Discharge:   Respiratory Treatments:   Oxygen Therapy:  is not on home oxygen therapy. Ventilator:    - No ventilator support    Heart Failure Instructions for Daily Management  Patient was treated for acute on chronic diastolic heart failure. he  will require the following:    Please weigh daily on the same scale and approximately the same time of day. Report weight gain of 3 pounds/day or 5 pounds/week to : facility MD, Veto Heimlich, [phone], and [facility] / Jase Jon [phone]. Please use hospital discharge weight as baseline reference. Please monitor for signs and symptoms of and report to MD:  Worsening Heart Failure: sudden weight gain, shortness of breath, lower extremity or general edema/swelling, abdominal bloating/swelling, inability to lie flat, intolerance to usual activity, or cough (especially at night). Report these finding even if no increase in weight.   Dehydration:  having difficulty or a decrease in urination, dizziness, worsening fatigue, or new onset/worsening of generalized weakness. Please continue a LOW SODIUM diet and LIMIT fluid intake to 48 - 64 ounces ( 1.5 - 2 liters) per day. Call Lucila Licea [phone], facility MD, and [facility] / Dalia Hawk [phone] with any questions or concerns. Please continue heart failure education to patient and family/support system. See After Visit Summary for hospital follow up appointment details. Consider spiritual care referral for support and/or completion of advance directives . Consider: having the facility MD complete required 7 day follow up, [place] [address] telehealth program if patient agreeable and able to participate, and palliative care consult for ongoing goals of care, end of life, and/or chronic disease management discussions. Patient's primary cardiologist is Dr Vignesh Gordon. Please draw BMP on 2/20 and fax results to [facility] so they can be reviewed at cardiology followup. Rehab Therapies: None  Weight Bearing Status/Restrictions: No weight bearing restrictions  Other Medical Equipment (for information only, NOT a DME order):  None  Other Treatments:     Patient's personal belongings (please select all that are sent with patient):  None    RN SIGNATURE:  Electronically signed by Kenyatta Fisher RN on 2/15/23 at 10:16 AM EST    CASE MANAGEMENT/SOCIAL WORK SECTION    Inpatient Status Date: 2/11/23    Readmission Risk Assessment Score:  Readmission Risk              Risk of Unplanned Readmission:  29           Discharging to Facility/ Agency   Name: [facility]  Address: [address] [place] [address]  Phone: [phone]  Fax: [phone]    Dialysis Facility (if applicable)   Name:  Address:  Dialysis Schedule:  Phone:  Fax:    / signature: Electronically signed by Dylan Moon on 2/15/23 at 2:09 PM EST    PHYSICIAN SECTION    Prognosis: Fair    Condition at Discharge: Stable    Rehab Potential (if transferring to Rehab):  Fair    Recommended Labs or Other Treatments After Discharge:   -BMP prior to follow-up with cardiology Dr Arthur Walker within 1 week  -Follow-up with PCP and endocrinology Dr Lissett Becerril within 1 week  -Lasix 40mg BID      Physician Certification: I certify the above information and transfer of Jered Figures  is necessary for the continuing treatment of the diagnosis listed and that he requires [facility] for greater 30 days.      Update Admission H&P: No change in H&P    PHYSICIAN SIGNATURE:  Electronically signed by Marline Bacon MD on 2/15/23 at 10:04 AM EST

## 2023-02-15 NOTE — PROGRESS NOTES
Pt discharged at 1800.  IV and telemetry removed.  Pt belongings gathered.  AVS in transport packet.  Pt did not eat dinner and so did not cover w/ insulin prior to d/c.

## 2023-02-21 ENCOUNTER — OFFICE VISIT (OUTPATIENT)
Dept: CARDIOLOGY CLINIC | Age: 43
End: 2023-02-21
Payer: COMMERCIAL

## 2023-02-21 VITALS
OXYGEN SATURATION: 96 % | WEIGHT: 315 LBS | DIASTOLIC BLOOD PRESSURE: 108 MMHG | HEIGHT: 73 IN | BODY MASS INDEX: 41.75 KG/M2 | HEART RATE: 98 BPM | SYSTOLIC BLOOD PRESSURE: 164 MMHG

## 2023-02-21 DIAGNOSIS — I50.43 ACUTE ON CHRONIC COMBINED SYSTOLIC AND DIASTOLIC HEART FAILURE (HCC): Primary | ICD-10-CM

## 2023-02-21 DIAGNOSIS — E78.2 MIXED HYPERLIPIDEMIA: ICD-10-CM

## 2023-02-21 DIAGNOSIS — I10 ESSENTIAL HYPERTENSION: ICD-10-CM

## 2023-02-21 PROCEDURE — 3077F SYST BP >= 140 MM HG: CPT | Performed by: NURSE PRACTITIONER

## 2023-02-21 PROCEDURE — 99214 OFFICE O/P EST MOD 30 MIN: CPT | Performed by: NURSE PRACTITIONER

## 2023-02-21 PROCEDURE — 3080F DIAST BP >= 90 MM HG: CPT | Performed by: NURSE PRACTITIONER

## 2023-02-21 RX ORDER — HYDRALAZINE HYDROCHLORIDE 100 MG/1
100 TABLET, FILM COATED ORAL 3 TIMES DAILY
Qty: 270 TABLET | Refills: 3 | Status: SHIPPED | OUTPATIENT
Start: 2023-02-21

## 2023-02-21 RX ORDER — FUROSEMIDE 40 MG/1
40 TABLET ORAL 2 TIMES DAILY
Qty: 180 TABLET | Refills: 3 | Status: SHIPPED | OUTPATIENT
Start: 2023-02-21

## 2023-02-21 NOTE — PROGRESS NOTES
CC CHF hopsital follow up. Interview limited d/t patient's cognitive disability. HPI:  38 y.o. patient of Dr Yudi Easley with HFrEF, HTN, HLD, DM and COPD (2 liter O2) who is here for CHF hospital follow up. He notes weight gain, SOB, LE edema, LH/dizziness and n/v/d. He denies cp, palpitations, syncope or falls. No orthopnea, PND or abdominal bloating. Past Medical History:   Diagnosis Date    COPD (chronic obstructive pulmonary disease) ([facility]     Essential hypertension     Hypertensive heart disease with CHF ([facility]     MDD (major depressive disorder)     Schizoaffective disorder (HCC)     Systolic CHF ([facility]     Transsexualism     Type 2 diabetes mellitus ([facility]      No past surgical history on file. No family history on file. Social History     Tobacco Use    Smoking status: Every Day     Types: Cigarettes    Smokeless tobacco: Never   Substance Use Topics    Alcohol use: Never    Drug use: Not Currently     Types: Marijuana [place]line [place])     Allergies:Patient has no active allergies. Review of Systems  All 12 point review of symptoms completed. Pertinent positives identified in the HPI, all other review of symptoms negative. Physical Exam:  BP (!) 164/108 (Site: Left Upper Arm, Position: Sitting, Cuff Size: Large Adult)   Pulse 98   Ht 6' 1\" (1.854 m)   Wt (!) 333 lb (151 kg)   SpO2 96%   BMI 43.93 kg/m²    General (appearance):  No acute distress  Eyes: anicteric   Neck: soft, No JVD  Ears/Nose/Mouth/Thorat: No cyanosis  CV: RRR   Respiratory:  clear, normal effort. Using 2 liters oxygen. GI: soft, non-tender, non-distended  Skin: Warm, dry. No rashes  Neuro/Psych: Alert and cooperative.  Appropriate behavior  Ext:  No c/c. + BLE edema  Pulses:  2+ radial     Weight  Wt Readings from Last 3 Encounters:   02/21/23 (!) 333 lb (151 kg)   02/15/23 (!) 317 lb 0.3 oz (143.8 kg)   12/29/22 (!) 320 lb (145.2 kg)          CBC:   Lab Results   Component Value Date    WBC 5.1 02/15/2023    HGB 12.6 (L) 02/15/2023    HCT 39.7 (L) 02/15/2023    MCV 84.5 02/15/2023     02/15/2023     BMP:  Lab Results   Component Value Date    CREATININE 1.2 02/15/2023    BUN 21 (H) 02/15/2023     (L) 02/15/2023    K 4.1 02/15/2023    CL 95 (L) 02/15/2023    CO2 33 (H) 02/15/2023     Estimated Creatinine Clearance: 125 mL/min (based on SCr of 1.2 mg/dL). Mag:   Lab Results   Component Value Date/Time    MG 1.80 02/15/2023 07:39 AM     LIVER PROFILE:   Lab Results   Component Value Date     (H) 2023    AST 42 (H) 2023    ALKPHOS 93 2023    BILITOT 0.3 2023     PT/INR:   Lab Results   Component Value Date    INR 1.02 2022    INR 0.98 2022    PROTIME 13.3 2022    PROTIME 12.9 2022     Pro-BNP   Lab Results   Component Value Date/Time    PROBNP 2,110 02/15/2023 07:39 AM    PROBNP 1,274 2023 10:04 AM    PROBNP 3,838 2023 06:23 PM     LIPIDS:  No components found for: CHLPL  Lab Results   Component Value Date    TRIG 504 (H) 11/15/2022     Lab Results   Component Value Date    HDL 35 (L) 11/15/2022     Lab Results   Component Value Date    LDLCALC see below 11/15/2022     Lab Results   Component Value Date    LABVLDL see below 11/15/2022     TSH:No results found for: TSH, M5VCJQG, A3LHJEX, THYROIDAB    IMAGIN2022 Echo   Technically difficult examination. Left ventricular cavity size is normal. There is mild concentric left   ventricular hypertrophy. Overall left ventricular systolic function appears normal. Ejection fraction   is visually estimated to be 50%. Cannot exclude regional wall motion abnormalities secondary to poor   endocardial visualization. Indeterminate diastolic function. The left atrium is mildly dilated. No pericardial effusion noted. No pleural effusion. IVC not well visualized. IVC size is normal (<2.1cm) and collapses > 50%   with respiration consistent with normal RA pressure (3mmHg).     2022 LHC: normal coronaries     Medications:   Current Outpatient Medications   Medication Sig Dispense Refill    carvedilol (COREG) 25 MG tablet Take 2 tablets by mouth 2 times daily (with meals) 60 tablet 3    insulin glargine (LANTUS;BASAGLAR) 100 UNIT/ML injection pen Inject 80 Units into the skin in the morning and at bedtime 5 Adjustable Dose Pre-filled Pen Syringe 3    furosemide (LASIX) 40 MG tablet Take 1 tablet by mouth daily 30 tablet 3    hydrALAZINE (APRESOLINE) 50 MG tablet Take 1.5 tablets by mouth 3 times daily 90 tablet 3    spironolactone (ALDACTONE) 50 MG tablet Take 50 mg by mouth daily      hydrOXYzine HCl (ATARAX) 25 MG tablet Take 25 mg by mouth in the morning, at noon, and at bedtime      atorvastatin (LIPITOR) 80 MG tablet Take 1 tablet by mouth nightly 30 tablet 3    aspirin 81 MG chewable tablet Take 81 mg by mouth daily      benztropine (COGENTIN) 1 MG tablet Take 1 mg by mouth 2 times daily      vitamin B-12 (CYANOCOBALAMIN) 1000 MCG tablet Take 1,000 mcg by mouth daily      divalproex (DEPAKOTE SPRINKLE) 125 MG capsule Take 500 mg by mouth 3 times daily      fenofibrate micronized (LOFIBRA) 200 MG capsule Take 200 mg by mouth every morning (before breakfast)      ferrous gluconate (FERGON) 324 (38 Fe) MG tablet Take 324 mg by mouth daily (with breakfast)      haloperidol (HALDOL) 20 MG tablet Take 20 mg by mouth 3 times daily      metFORMIN (GLUCOPHAGE) 1000 MG tablet Take 1,000 mg by mouth 2 times daily (with meals)      insulin aspart (NOVOLOG FLEXPEN) 100 UNIT/ML injection pen Inject into the skin 3 times daily (before meals) Inject per sliding scale: if 200-249 inject 4 units, if 250-299 inject 8 units, if 300-349 inject 10 units, if 350-399 inject 12 units      omeprazole (PRILOSEC) 20 MG delayed release capsule Take 20 mg by mouth daily      senna (SENOKOT) 8.6 MG tablet Take 1 tablet by mouth 2 times daily      QUEtiapine (SEROQUEL) 200 MG tablet Take 200 mg by mouth in the morning and 200 mg at noon and 200 mg in the evening and 200 mg before bedtime. Dulaglutide (TRULICITY) 4.54 QN/4.9BH SOPN Inject 0.75 mg into the skin once a week On Saturdays      acetaminophen (TYLENOL) 325 MG tablet Take 975 mg by mouth every 6 hours as needed for Pain       No current facility-administered medications for this visit. Assessment:  No diagnosis found. Plan:    Acute on chronic HFrEF   Increase lasix to 40 mg po bid   BMP in weeks   Daily weights, low salt diet, compression stocking   Discussed s/s decompensated HF   Coreg   Hydralazine   Spironolactone     HTN: acuate    /108; 160/120   Increase hydralazine to 100 mg po TID  HLD: acute    Lipitor   Fenofitrate   Needs better triglyercide and diabetes control.    Follow up in 3-4 weeks         Reviewed most recent: CBC, BMP, LFT, Lipids, Mag, PT/INR, BNP, TSH  Reviewed most recent: ECG, Echo, LHC

## 2023-02-21 NOTE — PATIENT INSTRUCTIONS
Increase hydralazine to 100 mg three times a day    Increase lasix to 40 mg twice a day    Check Labs in 1 week (BMP, BNP and Mag)    Follow up in 2-3 weeks

## 2023-03-01 ENCOUNTER — TELEPHONE (OUTPATIENT)
Dept: OTHER | Facility: CLINIC | Age: 43
End: 2023-03-01

## 2023-03-01 ENCOUNTER — APPOINTMENT (OUTPATIENT)
Dept: GENERAL RADIOLOGY | Age: 43
DRG: 643 | End: 2023-03-01
Payer: COMMERCIAL

## 2023-03-01 ENCOUNTER — HOSPITAL ENCOUNTER (INPATIENT)
Age: 43
LOS: 4 days | Discharge: NURSING, REHAB OR LONG-TERM CARE | DRG: 643 | End: 2023-03-06
Attending: EMERGENCY MEDICINE | Admitting: INTERNAL MEDICINE
Payer: COMMERCIAL

## 2023-03-01 DIAGNOSIS — I50.33 ACUTE ON CHRONIC DIASTOLIC CONGESTIVE HEART FAILURE (HCC): ICD-10-CM

## 2023-03-01 DIAGNOSIS — I16.1 HYPERTENSIVE EMERGENCY: Primary | ICD-10-CM

## 2023-03-01 DIAGNOSIS — R73.9 HYPERGLYCEMIA: ICD-10-CM

## 2023-03-01 LAB
ANION GAP SERPL CALCULATED.3IONS-SCNC: 11 MMOL/L (ref 3–16)
BACTERIA: ABNORMAL /HPF
BASE EXCESS VENOUS: 4.2 MMOL/L (ref -2–3)
BASOPHILS ABSOLUTE: 0.1 K/UL (ref 0–0.2)
BASOPHILS RELATIVE PERCENT: 2 %
BILIRUBIN URINE: NEGATIVE
BLOOD, URINE: NEGATIVE
BUN BLDV-MCNC: 17 MG/DL (ref 7–20)
CALCIUM SERPL-MCNC: 8.9 MG/DL (ref 8.3–10.6)
CARBOXYHEMOGLOBIN: 1.2 % (ref 0–1.5)
CHLORIDE BLD-SCNC: 98 MMOL/L (ref 99–110)
CLARITY: CLEAR
CO2: 26 MMOL/L (ref 21–32)
COLOR: YELLOW
CREAT SERPL-MCNC: 1.1 MG/DL (ref 0.9–1.3)
EOSINOPHILS ABSOLUTE: 0 K/UL (ref 0–0.6)
EOSINOPHILS RELATIVE PERCENT: 0.1 %
EPITHELIAL CELLS, UA: ABNORMAL /HPF (ref 0–5)
GFR SERPL CREATININE-BSD FRML MDRD: >60 ML/MIN/{1.73_M2}
GLUCOSE BLD-MCNC: 399 MG/DL (ref 70–99)
GLUCOSE BLD-MCNC: 405 MG/DL (ref 70–99)
GLUCOSE URINE: >=1000 MG/DL
HCO3 VENOUS: 28.8 MMOL/L (ref 24–28)
HCT VFR BLD CALC: 40.6 % (ref 40.5–52.5)
HEMOGLOBIN, VEN, REDUCED: 9.4 %
HEMOGLOBIN: 12.7 G/DL (ref 13.5–17.5)
KETONES, URINE: NEGATIVE MG/DL
LEUKOCYTE ESTERASE, URINE: NEGATIVE
LYMPHOCYTES ABSOLUTE: 1.3 K/UL (ref 1–5.1)
LYMPHOCYTES RELATIVE PERCENT: 23.9 %
MCH RBC QN AUTO: 26.1 PG (ref 26–34)
MCHC RBC AUTO-ENTMCNC: 31.3 G/DL (ref 31–36)
MCV RBC AUTO: 83.3 FL (ref 80–100)
METHEMOGLOBIN VENOUS: 0.4 % (ref 0–1.5)
MICROSCOPIC EXAMINATION: YES
MONOCYTES ABSOLUTE: 0.7 K/UL (ref 0–1.3)
MONOCYTES RELATIVE PERCENT: 12 %
MUCUS: ABNORMAL /LPF
NEUTROPHILS ABSOLUTE: 3.4 K/UL (ref 1.7–7.7)
NEUTROPHILS RELATIVE PERCENT: 62 %
NITRITE, URINE: NEGATIVE
O2 SAT, VEN: 90 %
PCO2, VEN: 42.3 MMHG (ref 41–51)
PDW BLD-RTO: 15.6 % (ref 12.4–15.4)
PERFORMED ON: ABNORMAL
PH UA: 6 (ref 5–8)
PH VENOUS: 7.44 (ref 7.35–7.45)
PLATELET # BLD: 186 K/UL (ref 135–450)
PMV BLD AUTO: 9.6 FL (ref 5–10.5)
PO2, VEN: 61 MMHG (ref 25–40)
POTASSIUM REFLEX MAGNESIUM: 4 MMOL/L (ref 3.5–5.1)
PRO-BNP: 2771 PG/ML (ref 0–124)
PROTEIN UA: ABNORMAL MG/DL
RBC # BLD: 4.87 M/UL (ref 4.2–5.9)
RBC UA: ABNORMAL /HPF (ref 0–4)
SODIUM BLD-SCNC: 135 MMOL/L (ref 136–145)
SPECIFIC GRAVITY UA: 1.01 (ref 1–1.03)
TCO2 CALC VENOUS: 30 MMOL/L
TROPONIN: 0.1 NG/ML
URINE REFLEX TO CULTURE: ABNORMAL
URINE TYPE: ABNORMAL
UROBILINOGEN, URINE: 1 E.U./DL
WBC # BLD: 5.5 K/UL (ref 4–11)
WBC UA: ABNORMAL /HPF (ref 0–5)

## 2023-03-01 PROCEDURE — 71046 X-RAY EXAM CHEST 2 VIEWS: CPT

## 2023-03-01 PROCEDURE — 2580000003 HC RX 258: Performed by: PHYSICIAN ASSISTANT

## 2023-03-01 PROCEDURE — 93005 ELECTROCARDIOGRAM TRACING: CPT | Performed by: PHYSICIAN ASSISTANT

## 2023-03-01 PROCEDURE — 85025 COMPLETE CBC W/AUTO DIFF WBC: CPT

## 2023-03-01 PROCEDURE — 96374 THER/PROPH/DIAG INJ IV PUSH: CPT

## 2023-03-01 PROCEDURE — 99285 EMERGENCY DEPT VISIT HI MDM: CPT

## 2023-03-01 PROCEDURE — 96376 TX/PRO/DX INJ SAME DRUG ADON: CPT

## 2023-03-01 PROCEDURE — 84484 ASSAY OF TROPONIN QUANT: CPT

## 2023-03-01 PROCEDURE — 2500000003 HC RX 250 WO HCPCS: Performed by: PHYSICIAN ASSISTANT

## 2023-03-01 PROCEDURE — 83880 ASSAY OF NATRIURETIC PEPTIDE: CPT

## 2023-03-01 PROCEDURE — 82803 BLOOD GASES ANY COMBINATION: CPT

## 2023-03-01 PROCEDURE — 80048 BASIC METABOLIC PNL TOTAL CA: CPT

## 2023-03-01 PROCEDURE — 81001 URINALYSIS AUTO W/SCOPE: CPT

## 2023-03-01 PROCEDURE — 96361 HYDRATE IV INFUSION ADD-ON: CPT

## 2023-03-01 RX ORDER — LABETALOL HYDROCHLORIDE 5 MG/ML
10 INJECTION, SOLUTION INTRAVENOUS ONCE
Status: COMPLETED | OUTPATIENT
Start: 2023-03-01 | End: 2023-03-01

## 2023-03-01 RX ORDER — SODIUM CHLORIDE, SODIUM LACTATE, POTASSIUM CHLORIDE, AND CALCIUM CHLORIDE .6; .31; .03; .02 G/100ML; G/100ML; G/100ML; G/100ML
1000 INJECTION, SOLUTION INTRAVENOUS ONCE
Status: COMPLETED | OUTPATIENT
Start: 2023-03-01 | End: 2023-03-01

## 2023-03-01 RX ADMIN — SODIUM CHLORIDE, POTASSIUM CHLORIDE, SODIUM LACTATE AND CALCIUM CHLORIDE 1000 ML: 600; 310; 30; 20 INJECTION, SOLUTION INTRAVENOUS at 22:21

## 2023-03-01 RX ADMIN — LABETALOL HYDROCHLORIDE 10 MG: 5 INJECTION, SOLUTION INTRAVENOUS at 22:18

## 2023-03-01 RX ADMIN — LABETALOL HYDROCHLORIDE 10 MG: 5 INJECTION, SOLUTION INTRAVENOUS at 23:32

## 2023-03-01 ASSESSMENT — ENCOUNTER SYMPTOMS: SHORTNESS OF BREATH: 1

## 2023-03-02 ENCOUNTER — APPOINTMENT (OUTPATIENT)
Dept: CT IMAGING | Age: 43
DRG: 643 | End: 2023-03-02
Payer: COMMERCIAL

## 2023-03-02 PROBLEM — I16.1 HYPERTENSIVE EMERGENCY: Status: ACTIVE | Noted: 2023-03-02

## 2023-03-02 LAB
ALBUMIN SERPL-MCNC: 3.6 G/DL (ref 3.4–5)
ANION GAP SERPL CALCULATED.3IONS-SCNC: 10 MMOL/L (ref 3–16)
BUN BLDV-MCNC: 18 MG/DL (ref 7–20)
C-REACTIVE PROTEIN: 8.5 MG/L (ref 0–5.1)
CALCIUM SERPL-MCNC: 8.9 MG/DL (ref 8.3–10.6)
CHLORIDE BLD-SCNC: 95 MMOL/L (ref 99–110)
CO2: 27 MMOL/L (ref 21–32)
CORTISOL TOTAL: 27.2 UG/DL
CREAT SERPL-MCNC: 1 MG/DL (ref 0.9–1.3)
EKG ATRIAL RATE: 110 BPM
EKG ATRIAL RATE: 111 BPM
EKG DIAGNOSIS: NORMAL
EKG DIAGNOSIS: NORMAL
EKG P AXIS: 54 DEGREES
EKG P AXIS: 56 DEGREES
EKG P-R INTERVAL: 140 MS
EKG P-R INTERVAL: 140 MS
EKG Q-T INTERVAL: 354 MS
EKG Q-T INTERVAL: 370 MS
EKG QRS DURATION: 84 MS
EKG QRS DURATION: 86 MS
EKG QTC CALCULATION (BAZETT): 481 MS
EKG QTC CALCULATION (BAZETT): 500 MS
EKG R AXIS: -36 DEGREES
EKG R AXIS: -9 DEGREES
EKG T AXIS: 102 DEGREES
EKG T AXIS: 105 DEGREES
EKG VENTRICULAR RATE: 110 BPM
EKG VENTRICULAR RATE: 111 BPM
GFR SERPL CREATININE-BSD FRML MDRD: >60 ML/MIN/{1.73_M2}
GLUCOSE BLD-MCNC: 281 MG/DL (ref 70–99)
GLUCOSE BLD-MCNC: 298 MG/DL (ref 70–99)
GLUCOSE BLD-MCNC: 314 MG/DL (ref 70–99)
GLUCOSE BLD-MCNC: 319 MG/DL (ref 70–99)
GLUCOSE BLD-MCNC: 320 MG/DL (ref 70–99)
GLUCOSE BLD-MCNC: 347 MG/DL (ref 70–99)
GLUCOSE BLD-MCNC: 363 MG/DL (ref 70–99)
GLUCOSE BLD-MCNC: 373 MG/DL (ref 70–99)
GLUCOSE BLD-MCNC: 377 MG/DL (ref 70–99)
GLUCOSE BLD-MCNC: 382 MG/DL (ref 70–99)
GLUCOSE BLD-MCNC: 400 MG/DL (ref 70–99)
GLUCOSE BLD-MCNC: 433 MG/DL (ref 70–99)
OSMOLALITY: 309 MOSM/KG (ref 278–305)
PERFORMED ON: ABNORMAL
PHOSPHORUS: 3 MG/DL (ref 2.5–4.9)
POTASSIUM SERPL-SCNC: 4.1 MMOL/L (ref 3.5–5.1)
PROCALCITONIN: 0.06 NG/ML (ref 0–0.15)
SEDIMENTATION RATE, ERYTHROCYTE: 3 MM/HR (ref 0–15)
SODIUM BLD-SCNC: 132 MMOL/L (ref 136–145)
TOTAL CK: 231 U/L (ref 39–308)
TROPONIN: 0.09 NG/ML

## 2023-03-02 PROCEDURE — 82533 TOTAL CORTISOL: CPT

## 2023-03-02 PROCEDURE — 83835 ASSAY OF METANEPHRINES: CPT

## 2023-03-02 PROCEDURE — 6360000002 HC RX W HCPCS

## 2023-03-02 PROCEDURE — 2500000003 HC RX 250 WO HCPCS

## 2023-03-02 PROCEDURE — 2060000000 HC ICU INTERMEDIATE R&B

## 2023-03-02 PROCEDURE — 94761 N-INVAS EAR/PLS OXIMETRY MLT: CPT

## 2023-03-02 PROCEDURE — 93005 ELECTROCARDIOGRAM TRACING: CPT

## 2023-03-02 PROCEDURE — 6370000000 HC RX 637 (ALT 250 FOR IP)

## 2023-03-02 PROCEDURE — 82024 ASSAY OF ACTH: CPT

## 2023-03-02 PROCEDURE — 36415 COLL VENOUS BLD VENIPUNCTURE: CPT

## 2023-03-02 PROCEDURE — 6360000002 HC RX W HCPCS: Performed by: STUDENT IN AN ORGANIZED HEALTH CARE EDUCATION/TRAINING PROGRAM

## 2023-03-02 PROCEDURE — 6370000000 HC RX 637 (ALT 250 FOR IP): Performed by: INTERNAL MEDICINE

## 2023-03-02 PROCEDURE — 93010 ELECTROCARDIOGRAM REPORT: CPT | Performed by: INTERNAL MEDICINE

## 2023-03-02 PROCEDURE — 84244 ASSAY OF RENIN: CPT

## 2023-03-02 PROCEDURE — 84145 PROCALCITONIN (PCT): CPT

## 2023-03-02 PROCEDURE — 2500000003 HC RX 250 WO HCPCS: Performed by: INTERNAL MEDICINE

## 2023-03-02 PROCEDURE — 85652 RBC SED RATE AUTOMATED: CPT

## 2023-03-02 PROCEDURE — 2580000003 HC RX 258: Performed by: STUDENT IN AN ORGANIZED HEALTH CARE EDUCATION/TRAINING PROGRAM

## 2023-03-02 PROCEDURE — 83930 ASSAY OF BLOOD OSMOLALITY: CPT

## 2023-03-02 PROCEDURE — 82088 ASSAY OF ALDOSTERONE: CPT

## 2023-03-02 PROCEDURE — 0202U NFCT DS 22 TRGT SARS-COV-2: CPT

## 2023-03-02 PROCEDURE — 6360000004 HC RX CONTRAST MEDICATION: Performed by: INTERNAL MEDICINE

## 2023-03-02 PROCEDURE — 6360000002 HC RX W HCPCS: Performed by: INTERNAL MEDICINE

## 2023-03-02 PROCEDURE — 82550 ASSAY OF CK (CPK): CPT

## 2023-03-02 PROCEDURE — 86140 C-REACTIVE PROTEIN: CPT

## 2023-03-02 PROCEDURE — 80069 RENAL FUNCTION PANEL: CPT

## 2023-03-02 PROCEDURE — 2580000003 HC RX 258

## 2023-03-02 PROCEDURE — 6370000000 HC RX 637 (ALT 250 FOR IP): Performed by: STUDENT IN AN ORGANIZED HEALTH CARE EDUCATION/TRAINING PROGRAM

## 2023-03-02 PROCEDURE — 94640 AIRWAY INHALATION TREATMENT: CPT

## 2023-03-02 PROCEDURE — 99223 1ST HOSP IP/OBS HIGH 75: CPT | Performed by: INTERNAL MEDICINE

## 2023-03-02 PROCEDURE — 71260 CT THORAX DX C+: CPT | Performed by: INTERNAL MEDICINE

## 2023-03-02 PROCEDURE — 87641 MR-STAPH DNA AMP PROBE: CPT

## 2023-03-02 PROCEDURE — 2700000000 HC OXYGEN THERAPY PER DAY

## 2023-03-02 RX ORDER — ASPIRIN 81 MG/1
81 TABLET, CHEWABLE ORAL DAILY
Status: DISCONTINUED | OUTPATIENT
Start: 2023-03-02 | End: 2023-03-06 | Stop reason: HOSPADM

## 2023-03-02 RX ORDER — MORPHINE SULFATE 2 MG/ML
2 INJECTION, SOLUTION INTRAMUSCULAR; INTRAVENOUS ONCE
Status: COMPLETED | OUTPATIENT
Start: 2023-03-02 | End: 2023-03-02

## 2023-03-02 RX ORDER — POLYETHYLENE GLYCOL 3350 17 G/17G
17 POWDER, FOR SOLUTION ORAL DAILY PRN
Status: DISCONTINUED | OUTPATIENT
Start: 2023-03-02 | End: 2023-03-03

## 2023-03-02 RX ORDER — ACETAMINOPHEN 650 MG/1
650 SUPPOSITORY RECTAL EVERY 6 HOURS PRN
Status: DISCONTINUED | OUTPATIENT
Start: 2023-03-02 | End: 2023-03-02

## 2023-03-02 RX ORDER — DIVALPROEX SODIUM 125 MG/1
500 CAPSULE, COATED PELLETS ORAL 3 TIMES DAILY
Status: DISCONTINUED | OUTPATIENT
Start: 2023-03-02 | End: 2023-03-06 | Stop reason: HOSPADM

## 2023-03-02 RX ORDER — LABETALOL HYDROCHLORIDE 5 MG/ML
10 INJECTION, SOLUTION INTRAVENOUS
Status: DISCONTINUED | OUTPATIENT
Start: 2023-03-02 | End: 2023-03-02

## 2023-03-02 RX ORDER — ACETAMINOPHEN 325 MG/1
650 TABLET ORAL EVERY 6 HOURS PRN
Status: DISCONTINUED | OUTPATIENT
Start: 2023-03-02 | End: 2023-03-02

## 2023-03-02 RX ORDER — CARVEDILOL 25 MG/1
25 TABLET ORAL 2 TIMES DAILY WITH MEALS
Status: DISCONTINUED | OUTPATIENT
Start: 2023-03-03 | End: 2023-03-02

## 2023-03-02 RX ORDER — IPRATROPIUM BROMIDE AND ALBUTEROL SULFATE 2.5; .5 MG/3ML; MG/3ML
1 SOLUTION RESPIRATORY (INHALATION) EVERY 4 HOURS PRN
Status: DISCONTINUED | OUTPATIENT
Start: 2023-03-02 | End: 2023-03-03 | Stop reason: SDUPTHER

## 2023-03-02 RX ORDER — CARVEDILOL 25 MG/1
50 TABLET ORAL 2 TIMES DAILY WITH MEALS
Status: DISCONTINUED | OUTPATIENT
Start: 2023-03-03 | End: 2023-03-02

## 2023-03-02 RX ORDER — QUETIAPINE FUMARATE 200 MG/1
200 TABLET, FILM COATED ORAL EVERY 6 HOURS
Status: DISCONTINUED | OUTPATIENT
Start: 2023-03-02 | End: 2023-03-06 | Stop reason: HOSPADM

## 2023-03-02 RX ORDER — CARVEDILOL 25 MG/1
50 TABLET ORAL 2 TIMES DAILY WITH MEALS
Status: DISCONTINUED | OUTPATIENT
Start: 2023-03-02 | End: 2023-03-02

## 2023-03-02 RX ORDER — INSULIN LISPRO 100 [IU]/ML
5 INJECTION, SOLUTION INTRAVENOUS; SUBCUTANEOUS
Status: DISCONTINUED | OUTPATIENT
Start: 2023-03-02 | End: 2023-03-02

## 2023-03-02 RX ORDER — HYDRALAZINE HYDROCHLORIDE 100 MG/1
100 TABLET, FILM COATED ORAL 3 TIMES DAILY
Status: CANCELLED | OUTPATIENT
Start: 2023-03-02

## 2023-03-02 RX ORDER — FENOFIBRATE 160 MG/1
160 TABLET ORAL DAILY
Status: DISCONTINUED | OUTPATIENT
Start: 2023-03-02 | End: 2023-03-06 | Stop reason: HOSPADM

## 2023-03-02 RX ORDER — FUROSEMIDE 40 MG/1
40 TABLET ORAL DAILY
COMMUNITY

## 2023-03-02 RX ORDER — PANTOPRAZOLE SODIUM 40 MG/1
40 TABLET, DELAYED RELEASE ORAL
Status: DISCONTINUED | OUTPATIENT
Start: 2023-03-02 | End: 2023-03-02

## 2023-03-02 RX ORDER — GLUCAGON 1 MG/ML
1 KIT INJECTION PRN
Status: CANCELLED | OUTPATIENT
Start: 2023-03-02

## 2023-03-02 RX ORDER — CARVEDILOL 25 MG/1
50 TABLET ORAL
Status: DISCONTINUED | OUTPATIENT
Start: 2023-03-02 | End: 2023-03-02

## 2023-03-02 RX ORDER — INSULIN LISPRO 100 [IU]/ML
0-16 INJECTION, SOLUTION INTRAVENOUS; SUBCUTANEOUS
Status: DISCONTINUED | OUTPATIENT
Start: 2023-03-02 | End: 2023-03-06 | Stop reason: HOSPADM

## 2023-03-02 RX ORDER — HALOPERIDOL 5 MG/1
20 TABLET ORAL 3 TIMES DAILY
Status: DISCONTINUED | OUTPATIENT
Start: 2023-03-02 | End: 2023-03-06 | Stop reason: HOSPADM

## 2023-03-02 RX ORDER — ACETAMINOPHEN 500 MG
1000 TABLET ORAL EVERY 6 HOURS PRN
Status: DISCONTINUED | OUTPATIENT
Start: 2023-03-02 | End: 2023-03-06 | Stop reason: HOSPADM

## 2023-03-02 RX ORDER — PREDNISONE 20 MG/1
40 TABLET ORAL DAILY
Status: DISCONTINUED | OUTPATIENT
Start: 2023-03-02 | End: 2023-03-02

## 2023-03-02 RX ORDER — LEVOFLOXACIN 5 MG/ML
750 INJECTION, SOLUTION INTRAVENOUS EVERY 24 HOURS
Status: DISCONTINUED | OUTPATIENT
Start: 2023-03-02 | End: 2023-03-03

## 2023-03-02 RX ORDER — QUETIAPINE FUMARATE 200 MG/1
200 TABLET, FILM COATED ORAL EVERY 6 HOURS
Status: DISCONTINUED | OUTPATIENT
Start: 2023-03-02 | End: 2023-03-02

## 2023-03-02 RX ORDER — 0.9 % SODIUM CHLORIDE 0.9 %
500 INTRAVENOUS SOLUTION INTRAVENOUS ONCE
Status: COMPLETED | OUTPATIENT
Start: 2023-03-02 | End: 2023-03-02

## 2023-03-02 RX ORDER — FUROSEMIDE 10 MG/ML
80 INJECTION INTRAMUSCULAR; INTRAVENOUS 2 TIMES DAILY
Status: COMPLETED | OUTPATIENT
Start: 2023-03-02 | End: 2023-03-03

## 2023-03-02 RX ORDER — AMLODIPINE BESYLATE 2.5 MG/1
2.5 TABLET ORAL DAILY
Status: DISCONTINUED | OUTPATIENT
Start: 2023-03-02 | End: 2023-03-03

## 2023-03-02 RX ORDER — LABETALOL HYDROCHLORIDE 5 MG/ML
10 INJECTION, SOLUTION INTRAVENOUS EVERY 4 HOURS PRN
Status: DISCONTINUED | OUTPATIENT
Start: 2023-03-02 | End: 2023-03-02 | Stop reason: SDUPTHER

## 2023-03-02 RX ORDER — DEXTROSE MONOHYDRATE 100 MG/ML
INJECTION, SOLUTION INTRAVENOUS CONTINUOUS PRN
Status: DISCONTINUED | OUTPATIENT
Start: 2023-03-02 | End: 2023-03-06 | Stop reason: HOSPADM

## 2023-03-02 RX ORDER — INSULIN LISPRO 100 [IU]/ML
0-4 INJECTION, SOLUTION INTRAVENOUS; SUBCUTANEOUS NIGHTLY
Status: DISCONTINUED | OUTPATIENT
Start: 2023-03-02 | End: 2023-03-06 | Stop reason: HOSPADM

## 2023-03-02 RX ORDER — INSULIN LISPRO 100 [IU]/ML
10 INJECTION, SOLUTION INTRAVENOUS; SUBCUTANEOUS ONCE
Status: COMPLETED | OUTPATIENT
Start: 2023-03-02 | End: 2023-03-02

## 2023-03-02 RX ORDER — HYDROXYZINE HYDROCHLORIDE 25 MG/1
25 TABLET, FILM COATED ORAL 3 TIMES DAILY
Status: DISCONTINUED | OUTPATIENT
Start: 2023-03-02 | End: 2023-03-06 | Stop reason: HOSPADM

## 2023-03-02 RX ORDER — LIDOCAINE HYDROCHLORIDE 20 MG/ML
JELLY TOPICAL PRN
Status: DISCONTINUED | OUTPATIENT
Start: 2023-03-02 | End: 2023-03-06 | Stop reason: HOSPADM

## 2023-03-02 RX ORDER — DEXTROSE MONOHYDRATE 100 MG/ML
INJECTION, SOLUTION INTRAVENOUS CONTINUOUS PRN
Status: CANCELLED | OUTPATIENT
Start: 2023-03-02

## 2023-03-02 RX ORDER — ONDANSETRON 4 MG/1
4 TABLET, ORALLY DISINTEGRATING ORAL EVERY 8 HOURS PRN
Status: DISCONTINUED | OUTPATIENT
Start: 2023-03-02 | End: 2023-03-06 | Stop reason: HOSPADM

## 2023-03-02 RX ORDER — LIDOCAINE 4 G/G
1 PATCH TOPICAL DAILY
Status: DISCONTINUED | OUTPATIENT
Start: 2023-03-02 | End: 2023-03-06 | Stop reason: HOSPADM

## 2023-03-02 RX ORDER — SENNA PLUS 8.6 MG/1
1 TABLET ORAL 2 TIMES DAILY
Status: DISCONTINUED | OUTPATIENT
Start: 2023-03-02 | End: 2023-03-06 | Stop reason: HOSPADM

## 2023-03-02 RX ORDER — SPIRONOLACTONE 50 MG/1
50 TABLET, FILM COATED ORAL DAILY
Status: DISCONTINUED | OUTPATIENT
Start: 2023-03-02 | End: 2023-03-04

## 2023-03-02 RX ORDER — SODIUM CHLORIDE 0.9 % (FLUSH) 0.9 %
5-40 SYRINGE (ML) INJECTION EVERY 12 HOURS SCHEDULED
Status: DISCONTINUED | OUTPATIENT
Start: 2023-03-02 | End: 2023-03-06 | Stop reason: HOSPADM

## 2023-03-02 RX ORDER — CARVEDILOL 25 MG/1
50 TABLET ORAL 2 TIMES DAILY WITH MEALS
Status: DISCONTINUED | OUTPATIENT
Start: 2023-03-02 | End: 2023-03-06 | Stop reason: HOSPADM

## 2023-03-02 RX ORDER — INSULIN LISPRO 100 [IU]/ML
7 INJECTION, SOLUTION INTRAVENOUS; SUBCUTANEOUS ONCE
Status: COMPLETED | OUTPATIENT
Start: 2023-03-02 | End: 2023-03-02

## 2023-03-02 RX ORDER — INSULIN LISPRO 100 [IU]/ML
0-16 INJECTION, SOLUTION INTRAVENOUS; SUBCUTANEOUS
Status: CANCELLED | OUTPATIENT
Start: 2023-03-02

## 2023-03-02 RX ORDER — INSULIN LISPRO 100 [IU]/ML
15 INJECTION, SOLUTION INTRAVENOUS; SUBCUTANEOUS
Status: DISCONTINUED | OUTPATIENT
Start: 2023-03-02 | End: 2023-03-03

## 2023-03-02 RX ORDER — HYDRALAZINE HYDROCHLORIDE 50 MG/1
50 TABLET, FILM COATED ORAL EVERY 8 HOURS SCHEDULED
Status: DISCONTINUED | OUTPATIENT
Start: 2023-03-02 | End: 2023-03-02

## 2023-03-02 RX ORDER — SODIUM CHLORIDE 9 MG/ML
INJECTION, SOLUTION INTRAVENOUS CONTINUOUS
Status: ACTIVE | OUTPATIENT
Start: 2023-03-02 | End: 2023-03-03

## 2023-03-02 RX ORDER — FUROSEMIDE 40 MG/1
40 TABLET ORAL 2 TIMES DAILY
Status: DISCONTINUED | OUTPATIENT
Start: 2023-03-02 | End: 2023-03-02

## 2023-03-02 RX ORDER — NICOTINE 21 MG/24HR
1 PATCH, TRANSDERMAL 24 HOURS TRANSDERMAL DAILY
Status: DISCONTINUED | OUTPATIENT
Start: 2023-03-02 | End: 2023-03-06 | Stop reason: HOSPADM

## 2023-03-02 RX ORDER — LABETALOL HYDROCHLORIDE 5 MG/ML
10 INJECTION, SOLUTION INTRAVENOUS ONCE
Status: COMPLETED | OUTPATIENT
Start: 2023-03-02 | End: 2023-03-02

## 2023-03-02 RX ORDER — GLUCAGON 1 MG/ML
1 KIT INJECTION PRN
Status: DISCONTINUED | OUTPATIENT
Start: 2023-03-02 | End: 2023-03-06 | Stop reason: HOSPADM

## 2023-03-02 RX ORDER — HYDRALAZINE HYDROCHLORIDE 100 MG/1
100 TABLET, FILM COATED ORAL 3 TIMES DAILY
Status: DISCONTINUED | OUTPATIENT
Start: 2023-03-02 | End: 2023-03-05

## 2023-03-02 RX ORDER — SODIUM CHLORIDE 0.9 % (FLUSH) 0.9 %
5-40 SYRINGE (ML) INJECTION PRN
Status: DISCONTINUED | OUTPATIENT
Start: 2023-03-02 | End: 2023-03-06 | Stop reason: HOSPADM

## 2023-03-02 RX ORDER — FUROSEMIDE 40 MG/1
40 TABLET ORAL DAILY
Status: DISCONTINUED | OUTPATIENT
Start: 2023-03-02 | End: 2023-03-02

## 2023-03-02 RX ORDER — ATORVASTATIN CALCIUM 80 MG/1
80 TABLET, FILM COATED ORAL NIGHTLY
Status: DISCONTINUED | OUTPATIENT
Start: 2023-03-02 | End: 2023-03-06 | Stop reason: HOSPADM

## 2023-03-02 RX ORDER — SODIUM CHLORIDE 9 MG/ML
INJECTION, SOLUTION INTRAVENOUS PRN
Status: DISCONTINUED | OUTPATIENT
Start: 2023-03-02 | End: 2023-03-06 | Stop reason: HOSPADM

## 2023-03-02 RX ORDER — INSULIN LISPRO 100 [IU]/ML
0-4 INJECTION, SOLUTION INTRAVENOUS; SUBCUTANEOUS NIGHTLY
Status: CANCELLED | OUTPATIENT
Start: 2023-03-02

## 2023-03-02 RX ORDER — BENZTROPINE MESYLATE 1 MG/1
1 TABLET ORAL 2 TIMES DAILY
Status: DISCONTINUED | OUTPATIENT
Start: 2023-03-02 | End: 2023-03-06 | Stop reason: HOSPADM

## 2023-03-02 RX ORDER — LABETALOL HYDROCHLORIDE 5 MG/ML
10 INJECTION, SOLUTION INTRAVENOUS EVERY 6 HOURS PRN
Status: DISCONTINUED | OUTPATIENT
Start: 2023-03-02 | End: 2023-03-06 | Stop reason: HOSPADM

## 2023-03-02 RX ORDER — ONDANSETRON 2 MG/ML
4 INJECTION INTRAMUSCULAR; INTRAVENOUS EVERY 6 HOURS PRN
Status: DISCONTINUED | OUTPATIENT
Start: 2023-03-02 | End: 2023-03-06 | Stop reason: HOSPADM

## 2023-03-02 RX ORDER — ENOXAPARIN SODIUM 100 MG/ML
40 INJECTION SUBCUTANEOUS 2 TIMES DAILY
Status: DISCONTINUED | OUTPATIENT
Start: 2023-03-02 | End: 2023-03-06 | Stop reason: HOSPADM

## 2023-03-02 RX ORDER — PANTOPRAZOLE SODIUM 40 MG/1
40 TABLET, DELAYED RELEASE ORAL
Status: DISCONTINUED | OUTPATIENT
Start: 2023-03-02 | End: 2023-03-06 | Stop reason: HOSPADM

## 2023-03-02 RX ADMIN — HYDROXYZINE HYDROCHLORIDE 25 MG: 25 TABLET ORAL at 20:30

## 2023-03-02 RX ADMIN — SODIUM CHLORIDE 500 ML: 9 INJECTION, SOLUTION INTRAVENOUS at 17:44

## 2023-03-02 RX ADMIN — LABETALOL HYDROCHLORIDE 10 MG: 5 INJECTION, SOLUTION INTRAVENOUS at 10:13

## 2023-03-02 RX ADMIN — HALOPERIDOL 20 MG: 5 TABLET ORAL at 20:34

## 2023-03-02 RX ADMIN — ENOXAPARIN SODIUM 40 MG: 100 INJECTION SUBCUTANEOUS at 01:57

## 2023-03-02 RX ADMIN — HYDROXYZINE HYDROCHLORIDE 25 MG: 25 TABLET ORAL at 08:33

## 2023-03-02 RX ADMIN — IOPAMIDOL 75 ML: 755 INJECTION, SOLUTION INTRAVENOUS at 12:20

## 2023-03-02 RX ADMIN — INSULIN LISPRO 16 UNITS: 100 INJECTION, SOLUTION INTRAVENOUS; SUBCUTANEOUS at 11:46

## 2023-03-02 RX ADMIN — HALOPERIDOL 20 MG: 5 TABLET ORAL at 14:46

## 2023-03-02 RX ADMIN — DIVALPROEX SODIUM 500 MG: 125 CAPSULE, COATED PELLETS ORAL at 20:30

## 2023-03-02 RX ADMIN — LABETALOL HYDROCHLORIDE 10 MG: 5 INJECTION, SOLUTION INTRAVENOUS at 20:31

## 2023-03-02 RX ADMIN — SENNOSIDES 8.6 MG: 8.6 TABLET, COATED ORAL at 20:30

## 2023-03-02 RX ADMIN — IPRATROPIUM BROMIDE AND ALBUTEROL SULFATE 1 AMPULE: 2.5; .5 SOLUTION RESPIRATORY (INHALATION) at 02:45

## 2023-03-02 RX ADMIN — CARVEDILOL 50 MG: 25 TABLET, FILM COATED ORAL at 14:10

## 2023-03-02 RX ADMIN — MORPHINE SULFATE 2 MG: 2 INJECTION, SOLUTION INTRAMUSCULAR; INTRAVENOUS at 16:13

## 2023-03-02 RX ADMIN — LABETALOL HYDROCHLORIDE 10 MG: 5 INJECTION, SOLUTION INTRAVENOUS at 03:26

## 2023-03-02 RX ADMIN — LIDOCAINE HYDROCHLORIDE: 20 JELLY TOPICAL at 22:00

## 2023-03-02 RX ADMIN — PREDNISONE 40 MG: 20 TABLET ORAL at 10:18

## 2023-03-02 RX ADMIN — ONDANSETRON 4 MG: 2 INJECTION INTRAMUSCULAR; INTRAVENOUS at 17:29

## 2023-03-02 RX ADMIN — LEVOFLOXACIN 750 MG: 5 INJECTION, SOLUTION INTRAVENOUS at 18:05

## 2023-03-02 RX ADMIN — CARVEDILOL 50 MG: 25 TABLET, FILM COATED ORAL at 01:57

## 2023-03-02 RX ADMIN — INSULIN GLARGINE 80 UNITS: 100 INJECTION, SOLUTION SUBCUTANEOUS at 02:24

## 2023-03-02 RX ADMIN — HYDROXYZINE HYDROCHLORIDE 25 MG: 25 TABLET ORAL at 14:09

## 2023-03-02 RX ADMIN — SPIRONOLACTONE 50 MG: 50 TABLET ORAL at 07:02

## 2023-03-02 RX ADMIN — DIVALPROEX SODIUM 500 MG: 125 CAPSULE, COATED PELLETS ORAL at 11:33

## 2023-03-02 RX ADMIN — INSULIN LISPRO 4 UNITS: 100 INJECTION, SOLUTION INTRAVENOUS; SUBCUTANEOUS at 21:17

## 2023-03-02 RX ADMIN — HYDRALAZINE HYDROCHLORIDE 100 MG: 100 TABLET, FILM COATED ORAL at 08:33

## 2023-03-02 RX ADMIN — PANTOPRAZOLE SODIUM 40 MG: 40 TABLET, DELAYED RELEASE ORAL at 14:47

## 2023-03-02 RX ADMIN — ASPIRIN 81 MG 81 MG: 81 TABLET ORAL at 08:33

## 2023-03-02 RX ADMIN — ENOXAPARIN SODIUM 40 MG: 100 INJECTION SUBCUTANEOUS at 14:10

## 2023-03-02 RX ADMIN — ACETAMINOPHEN 1000 MG: 500 TABLET ORAL at 11:58

## 2023-03-02 RX ADMIN — INSULIN LISPRO 10 UNITS: 100 INJECTION, SOLUTION INTRAVENOUS; SUBCUTANEOUS at 15:15

## 2023-03-02 RX ADMIN — INSULIN LISPRO 12 UNITS: 100 INJECTION, SOLUTION INTRAVENOUS; SUBCUTANEOUS at 17:48

## 2023-03-02 RX ADMIN — SODIUM CHLORIDE, PRESERVATIVE FREE 10 ML: 5 INJECTION INTRAVENOUS at 08:33

## 2023-03-02 RX ADMIN — ACETAMINOPHEN 1000 MG: 500 TABLET ORAL at 20:31

## 2023-03-02 RX ADMIN — DIVALPROEX SODIUM 500 MG: 125 CAPSULE, COATED PELLETS ORAL at 14:12

## 2023-03-02 RX ADMIN — ATORVASTATIN CALCIUM 80 MG: 80 TABLET, FILM COATED ORAL at 20:31

## 2023-03-02 RX ADMIN — FUROSEMIDE 40 MG: 40 TABLET ORAL at 07:02

## 2023-03-02 RX ADMIN — FENOFIBRATE 160 MG: 160 TABLET, FILM COATED ORAL at 11:33

## 2023-03-02 RX ADMIN — HYDRALAZINE HYDROCHLORIDE 100 MG: 100 TABLET, FILM COATED ORAL at 20:30

## 2023-03-02 RX ADMIN — AMLODIPINE BESYLATE 2.5 MG: 2.5 TABLET ORAL at 17:46

## 2023-03-02 RX ADMIN — IPRATROPIUM BROMIDE AND ALBUTEROL SULFATE 1 AMPULE: 2.5; .5 SOLUTION RESPIRATORY (INHALATION) at 07:45

## 2023-03-02 RX ADMIN — INSULIN LISPRO 7 UNITS: 100 INJECTION, SOLUTION INTRAVENOUS; SUBCUTANEOUS at 08:35

## 2023-03-02 RX ADMIN — CEFTRIAXONE 1000 MG: 1 INJECTION, POWDER, FOR SOLUTION INTRAMUSCULAR; INTRAVENOUS at 14:08

## 2023-03-02 RX ADMIN — INSULIN LISPRO 12 UNITS: 100 INJECTION, SOLUTION INTRAVENOUS; SUBCUTANEOUS at 08:35

## 2023-03-02 RX ADMIN — HYDRALAZINE HYDROCHLORIDE 100 MG: 100 TABLET, FILM COATED ORAL at 14:10

## 2023-03-02 RX ADMIN — AZITHROMYCIN DIHYDRATE 500 MG: 500 INJECTION, POWDER, LYOPHILIZED, FOR SOLUTION INTRAVENOUS at 15:06

## 2023-03-02 RX ADMIN — INSULIN LISPRO 15 UNITS: 100 INJECTION, SOLUTION INTRAVENOUS; SUBCUTANEOUS at 16:18

## 2023-03-02 RX ADMIN — FUROSEMIDE 80 MG: 10 INJECTION, SOLUTION INTRAMUSCULAR; INTRAVENOUS at 17:30

## 2023-03-02 RX ADMIN — QUETIAPINE FUMARATE 200 MG: 200 TABLET ORAL at 22:34

## 2023-03-02 RX ADMIN — BENZTROPINE MESYLATE 1 MG: 1 TABLET ORAL at 22:11

## 2023-03-02 RX ADMIN — PANTOPRAZOLE SODIUM 40 MG: 40 TABLET, DELAYED RELEASE ORAL at 08:35

## 2023-03-02 RX ADMIN — LABETALOL HYDROCHLORIDE 10 MG: 5 INJECTION, SOLUTION INTRAVENOUS at 23:17

## 2023-03-02 RX ADMIN — QUETIAPINE FUMARATE 200 MG: 200 TABLET ORAL at 17:36

## 2023-03-02 RX ADMIN — SENNOSIDES 8.6 MG: 8.6 TABLET, COATED ORAL at 08:33

## 2023-03-02 ASSESSMENT — PAIN DESCRIPTION - LOCATION
LOCATION: ABDOMEN
LOCATION: HEAD
LOCATION: CHEST

## 2023-03-02 ASSESSMENT — PAIN SCALES - WONG BAKER
WONGBAKER_NUMERICALRESPONSE: 4
WONGBAKER_NUMERICALRESPONSE: 0
WONGBAKER_NUMERICALRESPONSE: 6

## 2023-03-02 ASSESSMENT — ENCOUNTER SYMPTOMS
COUGH: 1
NAUSEA: 0
ABDOMINAL DISTENTION: 1
CONSTIPATION: 0
VOMITING: 0
SHORTNESS OF BREATH: 1
CHOKING: 0
CHEST TIGHTNESS: 0
WHEEZING: 1
ABDOMINAL PAIN: 0

## 2023-03-02 ASSESSMENT — PAIN SCALES - GENERAL
PAINLEVEL_OUTOF10: 4
PAINLEVEL_OUTOF10: 4
PAINLEVEL_OUTOF10: 2
PAINLEVEL_OUTOF10: 2
PAINLEVEL_OUTOF10: 0
PAINLEVEL_OUTOF10: 5
PAINLEVEL_OUTOF10: 7

## 2023-03-02 ASSESSMENT — PAIN DESCRIPTION - ORIENTATION
ORIENTATION: RIGHT
ORIENTATION: RIGHT;LEFT;MID
ORIENTATION: MID

## 2023-03-02 ASSESSMENT — PAIN DESCRIPTION - DESCRIPTORS
DESCRIPTORS: STABBING
DESCRIPTORS: ACHING;PRESSURE
DESCRIPTORS: TIGHTNESS

## 2023-03-02 ASSESSMENT — PAIN DESCRIPTION - PAIN TYPE
TYPE: ACUTE PAIN

## 2023-03-02 ASSESSMENT — PAIN - FUNCTIONAL ASSESSMENT
PAIN_FUNCTIONAL_ASSESSMENT: ACTIVITIES ARE NOT PREVENTED
PAIN_FUNCTIONAL_ASSESSMENT: PREVENTS OR INTERFERES SOME ACTIVE ACTIVITIES AND ADLS
PAIN_FUNCTIONAL_ASSESSMENT: PREVENTS OR INTERFERES SOME ACTIVE ACTIVITIES AND ADLS

## 2023-03-02 ASSESSMENT — PAIN DESCRIPTION - FREQUENCY
FREQUENCY: INTERMITTENT
FREQUENCY: CONTINUOUS

## 2023-03-02 ASSESSMENT — PAIN DESCRIPTION - ONSET
ONSET: SUDDEN
ONSET: PROGRESSIVE

## 2023-03-02 NOTE — TELEPHONE ENCOUNTER
Writer contacted Dr. Linda Hale to inform of 30 day readmission risk. No Decision on disposition at this time.     Call Back: If you need to call back to inform of disposition you can contact me at [phone]

## 2023-03-02 NOTE — ED NOTES
ED TO INPATIENT SBAR HANDOFF    Patient Name: Veena Guevara   :  1980  38 y.o. MRN:  [id number]  Preferred Name  Kerrie Palacios  ED Room #:  O33/O04-09  Family/Caregiver Present no   Restraints no   Sitter no   Sepsis Risk Score Sepsis Risk Score: 3.85    Situation  Code Status: Prior No additional code details. Allergies: Patient has no active allergies. Weight: Patient Vitals for the past 96 hrs (Last 3 readings):   Weight   23 2135 (!) 352 lb 11.2 oz (160 kg)     Arrived from: nursing home  Chief Complaint:   Chief Complaint   Patient presents with   [facility] Hyperglycemia     Hospital Problem/Diagnosis:  Principal Problem:    Hypertensive emergency  Resolved Problems:    * No resolved hospital problems. *    Imaging:   XR CHEST (2 VW)   Final Result      Interval clearing of airspace disease throughout both lungs since prior exam from 2023. No acute chest process.         Abnormal labs:   Abnormal Labs Reviewed   CBC WITH AUTO DIFFERENTIAL - Abnormal; Notable for the following components:       Result Value    Hemoglobin 12.7 (*)     RDW 15.6 (*)     All other components within normal limits   BASIC METABOLIC PANEL W/ REFLEX TO MG FOR LOW K - Abnormal; Notable for the following components:    Sodium 135 (*)     Chloride 98 (*)     Glucose 405 (*)     All other components within normal limits   BRAIN NATRIURETIC PEPTIDE - Abnormal; Notable for the following components:    Pro-BNP 2,771 (*)     All other components within normal limits   TROPONIN - Abnormal; Notable for the following components:    Troponin 0.10 (*)     All other components within normal limits   URINALYSIS WITH REFLEX TO CULTURE - Abnormal; Notable for the following components:    Glucose, Ur >=1000 (*)     Protein, UA TRACE (*)     All other components within normal limits   BLOOD GAS, VENOUS - Abnormal; Notable for the following components:    pO2, Lewis 61.0 (*)     HCO3, Venous 28.8 (*)     Base Excess, Lewis 4.2 (*)     All other components within normal limits   TROPONIN - Abnormal; Notable for the following components:    Troponin 0.09 (*)     All other components within normal limits   MICROSCOPIC URINALYSIS - Abnormal; Notable for the following components:    Mucus, UA 1+ (*)     Bacteria, UA Rare (*)     All other components within normal limits   POCT GLUCOSE - Abnormal; Notable for the following components:    POC Glucose 399 (*)     All other components within normal limits     Critical values: yes     Abnormal Assessment Findings: None    Background  History:   Past Medical History:   Diagnosis Date    COPD [facility]chronic obstructive pulmonary disease) [facility])     Essential hypertension     [facility]     MDD (major depressive disorder)     Schizoaffective disorder ([facility])     Systolic CHF ([facility]     Transsexualism     Type 2 diabetes mellitus ([facility])        Assessment    Vitals/MEWS: MEWS Score: 4  Level of Consciousness: Alert (0)   Vitals:    03/01/23 2300 03/01/23 2330 03/01/23 2332 03/01/23 2355   BP: (!) 191/135 (!) 190/131 (!) 190/131 (!) 184/115   Pulse: (!) 103 (!) 106 (!) 108    Resp: 16 (!) 32     Temp:       TempSrc:       SpO2: 93% 92%     Weight:       Height:         FiO2 (%): None  O2 Flow Rate: O2 Device: None (Room air)    Cardiac Rhythm:    Pain Assessment: 5/10 [] Verbal [] Rochele Reveles Scale  Pain Scale:    Last documented pain score (0-10 scale)    Last documented pain medication administered: None given  Mental Status: alert  NIH Score:    C-SSRS:    Bedside swallow:    Rene Coma Scale (GCS): Active LDA's:   Peripheral IV 03/01/23 Right Antecubital (Active)     PO Status: Regular  Pertinent or High Risk Medications/Drips: no   o If Yes, please provide details: None  Pending Blood Product Administration: no     You may also review the ED PT Care Timeline found under the Summary Nursing Index tab.     Recommendation    Pending orders None  Plan for Discharge (if known): Additional Comments: Patient was given box lunch and ice water.     If any further questions, please call Sending RN at [phone]    Electronically signed by: Electronically signed by Rhiannon Arciniega RN on 3/2/2023 at 12:34 AM     Rhiannon Arciniega RN  03/02/23 6941

## 2023-03-02 NOTE — CONSULTS
Nephrology Consult Note                                                                                                                                                                                                                                                                                                                                                               Office : [phone]     Fax :[phone]      Patient's Name: Power Gil  11:38 AM  3/2/2023    Reason for Consult:  HTN  Requesting Physician:  Ke Gallo MD      Chief Complaint:  Hyperglycemia     History of Present Ilness:    Power Gil is a 38 y.o. male with prior history of COPD, hypertension, schizoaffective disorder, CHF, diabetes who presents to the ED with complaint of Dizziness, SOB and Hyperglycemia since morning of presentation. Seemed acute in onset with progressive worsening. Had assoc N/V prior to presentation. Pt was recently admitted for CHF exacerbation and followed up with cardiology since discharge. Pt remained hypertensive and his Hydralazine and Lasix doses were increased. He was seen by nephrology outpt who recommended hypercortisolism workup given clinical signs and referral to Endo. Labs were not completed. Pt was not seen by endo yet. Pt states that his presenting symptoms have improved. He endorses facial plethora for more than several weeks. He also endorses LE edema, and being unable to control his blood sugars. Past Medical History:   Diagnosis Date    COPD (chronic obstructive pulmonary disease) ([address]     Essential hypertension     Hypertensive heart disease with CHF ([address]     MDD (major depressive disorder)     Schizoaffective disorder ([facility])     Systolic CHF ([address]     Transsexualism     Type 2 diabetes mellitus ([address]        No past surgical history on file. No family history on file. reports that he has been smoking cigarettes.  He has never used smokeless tobacco. He reports that he does not currently use drugs after having used the following drugs: Marijuana Davina Toney). He reports that he does not drink alcohol. Allergies:  Patient has no known allergies.     Current Medications:    sodium chloride flush 0.9 % injection 5-40 mL, 2 times per day  sodium chloride flush 0.9 % injection 5-40 mL, PRN  0.9 % sodium chloride infusion, PRN  enoxaparin (LOVENOX) injection 40 mg, BID  ondansetron (ZOFRAN-ODT) disintegrating tablet 4 mg, Q8H PRN   Or  ondansetron (ZOFRAN) injection 4 mg, Q6H PRN  polyethylene glycol (GLYCOLAX) packet 17 g, Daily PRN  acetaminophen (TYLENOL) tablet 650 mg, Q6H PRN   Or  acetaminophen (TYLENOL) suppository 650 mg, Q6H PRN  glucose chewable tablet 16 g, PRN  dextrose bolus 10% 125 mL, PRN   Or  dextrose bolus 10% 250 mL, PRN  glucagon injection 1 mg, PRN  dextrose 10 % infusion, Continuous PRN  insulin lispro (1 Unit Dial) (HUMALOG/ADMELOG) pen 0-16 Units, TID WC  insulin lispro (1 Unit Dial) (HUMALOG/ADMELOG) pen 0-4 Units, Nightly  atorvastatin (LIPITOR) tablet 80 mg, Nightly  spironolactone (ALDACTONE) tablet 50 mg, Daily  [Held by provider] QUEtiapine (SEROQUEL) tablet 200 mg, Q6H  ipratropium-albuterol (DUONEB) nebulizer solution 1 ampule, Q4H PRN  haloperidol (HALDOL) tablet 20 mg, TID  benztropine (COGENTIN) tablet 1 mg, BID  [START ON 3/3/2023] carvedilol (COREG) tablet 50 mg, BID WC  lidocaine 4 % external patch 1 patch, Daily  furosemide (LASIX) tablet 40 mg, BID  hydrALAZINE (APRESOLINE) tablet 100 mg, TID  aspirin chewable tablet 81 mg, Daily  hydrOXYzine HCl (ATARAX) tablet 25 mg, TID  senna (SENOKOT) tablet 8.6 mg, BID  insulin glargine (LANTUS;BASAGLAR) injection pen 80 Units, BID  labetalol (NORMODYNE;TRANDATE) injection 10 mg, Q6H PRN  pantoprazole (PROTONIX) tablet 40 mg, BID AC  predniSONE (DELTASONE) tablet 40 mg, Daily  divalproex (DEPAKOTE SPRINKLE) DR capsule 500 mg, TID  fenofibrate (TRIGLIDE) tablet 160 mg, Daily        Review of Systems:   14 point ROS obtained but were negative except mentioned in HPI      Physical exam:     Vitals:  BP (!) 162/104   Pulse 99   Temp 97.7 °F (36.5 °C) (Oral)   Resp 24   Ht 6' 1\" (1.854 m)   Wt (!) 330 lb 14.6 oz (150.1 kg)   SpO2 95%   BMI 43.66 kg/m²   Constitutional:  OAA X3 NAD, morbidly obese  Skin: no rash, turgor wnl  Heent:  eomi, mmm, moon facies, buffalo hump  Neck: no bruits or jvd noted  Cardiovascular:  S1, S2 without m/r/g  Respiratory: CTA B without w/r/r  Abdomen:  +bs, soft, nt, nd, increased abdominal girth, no noticeable striae  Ext: + lower extremity edema  Psychiatric: mood and affect appropriate  Musculoskeletal:  Rom, muscular strength intact    Data:   Labs:  CBC:   Recent Labs     03/01/23 2119   WBC 5.5   HGB 12.7*        BMP:    Recent Labs     03/01/23 2119   *   K 4.0   CL 98*   CO2 26   BUN 17   CREATININE 1.1   GLUCOSE 405*     Ca/Mg/Phos:   Recent Labs     03/01/23 2119   CALCIUM 8.9     Hepatic: No results for input(s): AST, ALT, ALB, BILITOT, ALKPHOS in the last 72 hours. Troponin:   Recent Labs     03/01/23 2119 03/01/23  2358   TROPONINI 0.10* 0.09*     BNP: No results for input(s): BNP in the last 72 hours. Lipids: No results for input(s): CHOL, TRIG, HDL, LDLCALC, LABVLDL in the last 72 hours. ABGs: No results for input(s): PHART, PO2ART, XMB9RIE in the last 72 hours. INR: No results for input(s): INR in the last 72 hours. UA:  Recent Labs     03/01/23 2224   COLORU Yellow   CLARITYU Clear   GLUCOSEU >=1000*   BILIRUBINUR Negative   KETUA Negative   SPECGRAV 1.015   BLOODU Negative   PHUR 6.0   PROTEINU TRACE*   UROBILINOGEN 1.0   NITRU Negative   LEUKOCYTESUR Negative   LABMICR YES   URINETYPE Voided      Urine Microscopic:   Recent Labs     03/01/23 2224   BACTERIA Rare*   WBCUA None seen   RBCUA None seen   EPIU 0-1     Urine Culture: No results for input(s): LABURIN in the last 72 hours.   Urine Chemistry: No results for input(s): Harpreet Duarte, CLAUDIA, NAUR in the last 72 hours. IMAGING:  XR CHEST (2 VW)   Final Result      Interval clearing of airspace disease throughout both lungs since prior exam from February 2023. No acute chest process. CT CHEST PULMONARY EMBOLISM W CONTRAST    (Results Pending)       Assessment/Plan   Hypertensive urgency  As evidenced by elevated BP >180/120, no end-organ damage  BP's during last admission ranged 150-170's  BP in recent (2/21/23) cardiology office was 160/120  Possibly 2/2 hypercortisolism    Anemia    Acid- base/ Electrolyte imbalance     Uncontrolled DM with hyperglycemia    Obesity    PLAN:  Would not aggressively drop BP's  Better glucose control can aid in decreasing BP's  Continue Hydralazine 100mg TID (recently increased), Carvedilol 50mg BID, spironolactone 50mg qd  Continue Lasix 40mg BID (recently increased)  Aldosterone level/metanephrines already ordered and pending, will add renin to obtain ratio  Will order timed AM cortisol, Urine free cortisol (will need to be >3x ULN)  Can also perform an overnight dexa suppression test  Will continue to monitor  Labs in AM      Will discuss with Dr Wes Foote. Thank you for allowing us to participate in care of Jatin Kaur MD  [facility] [address]  Office : [phone]  Fax :[phone]      I have seen the patient independently from the resident . I discussed the care with the resident. I personally reviewed the HPI, PH, FH, SH, ROS and medications. I repeated pertinent portions of the examination and reviewed the relevant imaging and laboratory data.  I agree with the findings, assessment and plan as documented, with the following addendum:      Edgard Johns MD

## 2023-03-02 NOTE — PLAN OF CARE
Problem: Safety - Adult  Goal: Free from fall injury  Outcome: Progressing  Note: Fall precautions in place, hourly rounding, pt encouraged to use call light for assistance. Problem: Pain  Goal: Verbalizes/displays adequate comfort level or baseline comfort level  Outcome: Progressing  Flowsheets (Taken 3/2/2023 0412)  Verbalizes/displays adequate comfort level or baseline comfort level:   Encourage patient to monitor pain and request assistance   Assess pain using appropriate pain scale  Note: Chest pain stable at this time. Pt declined prns. Pt resting comfortably. Problem: Cardiovascular - Adult  Goal: Maintains optimal cardiac output and hemodynamic stability  Outcome: Progressing  Flowsheets (Taken 3/2/2023 0412)  Maintains optimal cardiac output and hemodynamic stability:   Monitor blood pressure and heart rate   Monitor urine output and notify Licensed Independent Practitioner for values outside of normal range  Note: Pts BP remains elevated. Systolic goal 160-404. One time dose of labetalol given. Will cont to monitor.

## 2023-03-02 NOTE — H&P
Internal Medicine Note    Patient Name: Edin Doyle   Admit Date: 3/1/2023   Code:Full Code  PCP: Jordyn Caceres MD   Attending: Nayana Chahal MD    Chief Complaint: Hyperglycemia       Subjective   HPI:   Edin Doyle is a 38 y.o. male, with PMHx of COPD, hypertension, schizoaffective disorder, CHF, diabetes who presents to the ED with complaint of Dizziness, SOB and Hyperglycemia. Patient reported that since morning he had been having headaches, shortness of breath and generalized weakness. It was acute in onset progressively getting worse and he did had associated nausea and had 1 episode of vomiting in the evening as well. Patient is not a very good historian, but he indicated that his shortness of breath increased with exertion but he had been short of breath even at rest as well. He also endorses that he felt so weak in his legs that he could not get up or walk around. But he did had generalized weakness as well. On review of system patient endorses blurring of the vision, and nausea as well    However he denied any difficulty swallowing, chest pain, chest tightness, palpitations, abdominal pain, change in urinary or bowel habits or any leg swellings. ROS:  As per HPI    ED Course:  Oriented x 4, NAD  Vitals: BP of  202/132, HR of 109, Temp of 98.7, SpO2 of 98%  Physical Exam: Tachypnea, non pitting b/l edema  Labs: Hyperglycemia 405, Pro BNP 2771 (2110 @Base), Trops 0.1, WBC wnl, UA WNL, VBG Ph 7.442, pCO2 42.3, HCO3 28.8  Imaging: CXR Non significant  Treatment: Labetalol 10x3, 1 L LR    Summary of Clinical Encounters:  02/12/2023 -- 02/18/2023 [facility] Presented with a chest pain and shortness of breath, diagnosed with hypoxic respiratory failure due to acute on chronic heart failure. Was hypertensive considered to be secondary to bilateral adrenal hyperplasia and was advised to follow-up with endocrinology in outpatient.     11/16/2022: Echo showed EF of 50% with mild concentric ventricular hypertrophy    Past Medical Hx:      Diagnosis Date    COPD (chronic obstructive pulmonary disease) ([facility])     Essential hypertension     Hypertensive heart disease with CHF ([facility])     MDD (major depressive disorder)     Schizoaffective disorder ([facility])     Systolic CHF ([facility] [address]     Transsexualism     Type 2 diabetes mellitus ([facility] [address]        Past Surgical Hx:  No past surgical history on file. Home Medication:  Prior to Admission medications    Medication Sig Start Date End Date Taking?  Authorizing Provider   furosemide (LASIX) 40 MG tablet Take 1 tablet by mouth 2 times daily 2/21/23   KAREN Hale CNP   hydrALAZINE (APRESOLINE) 100 MG tablet Take 1 tablet by mouth 3 times daily 2/21/23   KAREN Hale CNP   carvedilol (COREG) 25 MG tablet Take 2 tablets by mouth 2 times daily (with meals) 2/15/23   Garrison Kennedy MD   insulin glargine (LANTUS;BASAGLAR) 100 UNIT/ML injection pen Inject 80 Units into the skin in the morning and at bedtime 2/15/23   Garrison Kennedy MD   spironolactone (ALDACTONE) 50 MG tablet Take 50 mg by mouth daily    Historical Provider, MD   hydrOXYzine HCl (ATARAX) 25 MG tablet Take 25 mg by mouth in the morning, at noon, and at bedtime    Historical Provider, MD   atorvastatin (LIPITOR) 80 MG tablet Take 1 tablet by mouth nightly 11/21/22   Peter Carroll MD   aspirin 81 MG chewable tablet Take 81 mg by mouth daily    Historical Provider, MD   benztropine (COGENTIN) 1 MG tablet Take 1 mg by mouth 2 times daily    Historical Provider, MD   vitamin B-12 (CYANOCOBALAMIN) 1000 MCG tablet Take 1,000 mcg by mouth daily    Historical Provider, MD   divalproex (DEPAKOTE SPRINKLE) 125 MG capsule Take 500 mg by mouth 3 times daily    Historical Provider, MD   fenofibrate micronized (LOFIBRA) 200 MG capsule Take 200 mg by mouth every morning (before breakfast)    Historical Provider, MD   ferrous gluconate (FERGON) 324 (38 Fe) MG tablet Take 324 mg by mouth daily (with breakfast)    Historical Provider, MD   haloperidol (HALDOL) 20 MG tablet Take 20 mg by mouth 3 times daily    Historical Provider, MD   metFORMIN (GLUCOPHAGE) 1000 MG tablet Take 1,000 mg by mouth 2 times daily (with meals)    Historical Provider, MD   insulin aspart (NOVOLOG FLEXPEN) 100 UNIT/ML injection pen Inject into the skin 3 times daily (before meals) Inject per sliding scale: if 200-249 inject 4 units, if 250-299 inject 8 units, if 300-349 inject 10 units, if 350-399 inject 12 units    Historical Provider, MD   omeprazole (PRILOSEC) 20 MG delayed release capsule Take 20 mg by mouth daily    Historical Provider, MD   senna (SENOKOT) 8.6 MG tablet Take 1 tablet by mouth 2 times daily    Historical Provider, MD   QUEtiapine (SEROQUEL) 200 MG tablet Take 200 mg by mouth in the morning and 200 mg at noon and 200 mg in the evening and 200 mg before bedtime. Historical Provider, MD   Dulaglutide (TRULICITY) 9.64 EO/9.9HR SOPN Inject 0.75 mg into the skin once a week On Saturdays    Historical Provider, MD   acetaminophen (TYLENOL) 325 MG tablet Take 975 mg by mouth every 6 hours as needed for Pain    Historical Provider, MD       Allergies:  No Known Allergies    Social Hx:  Social History     Socioeconomic History    Marital status: Single   Tobacco Use    Smoking status: Every Day     Types: Cigarettes    Smokeless tobacco: Never   Substance and Sexual Activity    Alcohol use: Never    Drug use: Not Currently     Types: Marijuana Ajay Koroma)        Family Hx:  No family history on file.       Objective   Vital Signs:  Patient Vitals for the past 8 hrs:   BP Temp Temp src Pulse Resp SpO2 Height Weight   03/02/23 0100 (!) 172/124 98.3 °F (36.8 °C) Oral (!) 103 (!) 40 95 % -- --   03/02/23 0030 (!) 185/122 -- -- 99 21 92 % -- --   03/02/23 0015 (!) 171/110 -- -- 99 26 93 % -- --   03/02/23 0000 (!) 181/120 -- -- 99 17 (!) 89 % -- --   03/01/23 2355 (!) 184/115 -- -- -- -- -- -- --   03/01/23 2332 (!) 190/131 -- -- (!) 108 -- -- -- --   03/01/23 2330 (!) 190/131 -- -- (!) 106 (!) 32 92 % -- --   03/01/23 2300 (!) 191/135 -- -- (!) 103 16 93 % -- --   03/01/23 2218 (!) 190/138 -- -- (!) 111 -- -- -- --   03/01/23 2135 -- -- -- -- -- -- -- (!) 352 lb 11.2 oz (160 kg)   03/01/23 2056 (!) 202/135 98.7 °F (37.1 °C) Oral (!) 109 18 98 % 6' 1\" (1.854 m) --     Physical Exam  Constitutional:       General: He is in acute distress. Appearance: He is obese. HENT:      Head: Normocephalic and atraumatic. Mouth/Throat:      Mouth: Mucous membranes are moist.   Eyes:      Extraocular Movements: Extraocular movements intact. Pupils: Pupils are equal, round, and reactive to light. Cardiovascular:      Rate and Rhythm: Normal rate and regular rhythm. Pulses: Normal pulses. Heart sounds: Normal heart sounds. No murmur heard. No gallop. Pulmonary:      Effort: Respiratory distress present. Breath sounds: No wheezing, rhonchi or rales. Abdominal:      General: Bowel sounds are normal. There is distension. Palpations: Abdomen is soft. Tenderness: There is no abdominal tenderness. There is no guarding. Musculoskeletal:      Cervical back: Normal range of motion and neck supple. Right lower leg: Edema (+1 pitting edema) present. Left lower leg: Edema (+1 pitting edema) present. Skin:     Findings: No bruising or lesion. Neurological:      General: No focal deficit present. Mental Status: He is alert and oriented to person, place, and time. Cranial Nerves: No cranial nerve deficit. Sensory: No sensory deficit. Motor: No weakness.    Psychiatric:         Mood and Affect: Mood normal.         Behavior: Behavior normal.      Labs:  CBC:   Recent Labs     03/01/23 2119   WBC 5.5   HGB 12.7*   HCT 40.6          BMP:   Recent Labs     03/01/23 2119   *   K 4.0   CL 98*   CO2 26   BUN 17   CREATININE 1.1   GLUCOSE 405*    Magnesium: No results for input(s): MG in the last 72 hours. LFT's: No results for input(s): AST, ALT, ALB, BILITOT, ALKPHOS in the last 72 hours. INR: No results for input(s): INR in the last 72 hours. COVID-19: No results for input(s): COVID19 in the last 72 hours. Radiology:  XR CHEST (2 VW)   Final Result      Interval clearing of airspace disease throughout both lungs since prior exam from February 2023. No acute chest process. 11/19/2022: CT adrenals with and without contrast:     Impression       1. Diffuse symmetric nodular thickening of the adrenal glands bilaterally with the right adrenal gland measuring 7.2 cm in length and the left adrenal gland measuring 6.6 cm in length. The CT appearance is most consistent with diffuse nodular adrenal    hyperplasia rather than an adrenal mass. CT follow-up could be considered. 2. Diffuse fatty infiltration of liver.        Medications:  Current Facility-Administered Medications   Medication Dose Route Frequency Provider Last Rate Last Admin    sodium chloride flush 0.9 % injection 5-40 mL  5-40 mL IntraVENous 2 times per day Renetta Rival, DO        sodium chloride flush 0.9 % injection 5-40 mL  5-40 mL IntraVENous PRN Renetta Rival, DO        0.9 % sodium chloride infusion   IntraVENous PRN Renetta Rival, DO        enoxaparin (LOVENOX) injection 40 mg  40 mg SubCUTAneous BID Renetta Rival, DO        ondansetron (ZOFRAN-ODT) disintegrating tablet 4 mg  4 mg Oral Q8H PRN Renetta Rival, DO        Or    ondansetron (ZOFRAN) injection 4 mg  4 mg IntraVENous Q6H PRN Renetta Rival, DO        polyethylene glycol (GLYCOLAX) packet 17 g  17 g Oral Daily PRN Renetta Rival, DO        acetaminophen (TYLENOL) tablet 650 mg  650 mg Oral Q6H PRN Renetta Rival, DO        Or    acetaminophen (TYLENOL) suppository 650 mg  650 mg Rectal Q6H PRN Renetta Rival, DO        glucose chewable tablet 16 g  4 tablet Oral PRN Renetta Rival, DO        dextrose bolus 10% 125 mL  125 mL IntraVENous PRN Renetta Rival, DO        Or    dextrose bolus 10% 250 mL  250 mL IntraVENous PRN Belem Caller, DO        glucagon injection 1 mg  1 mg SubCUTAneous PRN Belem Caller, DO        dextrose 10 % infusion   IntraVENous Continuous PRN Belem Caller, DO        insulin lispro (1 Unit Dial) (HUMALOG/ADMELOG) pen 0-16 Units  0-16 Units SubCUTAneous TID  Wang Velásquez, DO        insulin lispro (1 Unit Dial) (HUMALOG/ADMELOG) pen 0-4 Units  0-4 Units SubCUTAneous Nightly Wang Velásquez, DO        atorvastatin (LIPITOR) tablet 80 mg  80 mg Oral Nightly Wang Sauld, DO        spironolactone (ALDACTONE) tablet 50 mg  50 mg Oral Daily Wang Velásquez, DO        carvedilol (COREG) tablet 50 mg  50 mg Oral BID  Wang Velásquez, DO        furosemide (LASIX) tablet 40 mg  40 mg Oral Daily Belem Caller,         [Held by provider] QUEtiapine (SEROQUEL) tablet 200 mg  200 mg Oral Q6H Belem Caller,                      Assessment & Plan   Evelia Duarte is a 38 y.o. male, with PMHx of COPD, hypertension, schizoaffective disorder, CHF, diabetes who presents to the ED with complaint of Dizziness, SOB and Hyperglycemia    Hypertensive Urgency  Likely secondary to b/l adrenal hyperplasia  Presented with a blood pressure of 202/135  On Lasix 40, hydralazine 100, carvedilol 25 mg at home  Got labetalol 10x3 in ED  -Get aldosterone levels  -Get adrenal vein sampling  -Continue spironolactone  -Continue Hydralazine  -Continue carvedilol      Hyperglycemia  Likely secondary to uncontrolled DM2  Ddx: Metabolic syndrome due to Atypical antipsychotic use  Presented with blood sugar of 402  A1c on 11/15/2022 was 12.6  On insulin Lantus 80 units nightly, insulin aspart 8 sliding scale, dulaglutide 0.75, and metformin  -Continue home insulin Lantus 80 units nightly  -Start high-dose sliding scale Premeal insulin lispro      Tropenemia  Likely type II MI secondary to hypertension  Ddx: Chronic troponinemia  -Trend trops      Chronic Medical Conditions  CHF: On Lasix, hydralazine and carvedilol at home  -Continue home medications    Schizoaffective disorder: On quetiapine 200 mg 3 times daily, and haloperidol 20 mg 3 times daily  -Continue home quetiapine    Hyperlipidemia: On fenofibrate 200 mg, and atorvastatin 80 mg  -Continue home medications      DVT PPx: Lovenox  Diet: ADULT DIET; Regular; 3 carb choices (45 gm/meal); Low Sodium (2 gm)   Code status:  Full Code   ELOS: 2 days  Barriers to discharge: Vital stability  Disposition  - Preadmission: Nursing facility ([address])  - Current: Medical floor  - Upon discharge:  To be discussed    Will discuss with attending physician Priscilla Lozano MD  ________________________  Neha Carter MD,   PGY-1, Internal Medicine  03/02/23  1:28 AM

## 2023-03-02 NOTE — PROGRESS NOTES
4 Eyes Admission Assessment     I agree as the admission nurse that 2 RN's have performed a thorough Head to Toe Skin Assessment on the patient. ALL assessment sites listed below have been assessed on admission. Areas assessed by both nurses:   [x]   Head, Face, and Ears   [x]   Shoulders, Back, and Chest  [x]   Arms, Elbows, and Hands   [x]   Coccyx, Sacrum, and Ischium  [x]   Legs, Feet, and Heels        Does the Patient have Skin Breakdown?   Yes a wound was noted on the Admission Assessment and an LDA was Initiated documentation include the Bertha-wound, Wound Assessment, Measurements, Dressing Treatment, Drainage, and Color\",   Scratches bilateral arms        Keenan Prevention initiated:  No   Wound Care Orders initiated:  No      WOC nurse consulted for Pressure Injury (Stage 3,4, Unstageable, DTI, NWPT, and Complex wounds) or Keenan score 18 or lower:  No      Nurse 1 eSignature: Electronically signed by Stacy García RN on 3/2/23 at 2:35 AM EST    **SHARE this note so that the co-signing nurse is able to place an eSignature**    Nurse 2 eSignature: Electronically signed by Hong Shahid RN on 3/2/23 at 6:38 AM EST

## 2023-03-02 NOTE — RT PROTOCOL NOTE
RT Nebulizer Bronchodilator Protocol Note    There is a bronchodilator order in the chart from a provider indicating to follow the RT Bronchodilator Protocol and there is an Initiate RT Bronchodilator Protocol order as well (see protocol at bottom of note). CXR Findings:  No results found. The findings from the last RT Protocol Assessment were as follows:  Smoking: Chronic pulmonary disease  Respiratory Pattern: Dyspnea on exertion or RR 21-25 bpm  Breath Sounds: Slightly diminished and/or crackles  Cough: Strong, spontaneous, non-productive  Indication for Bronchodilator Therapy:    Bronchodilator Assessment Score: 6    Aerosolized bronchodilator medication orders have been revised according to the RT Nebulizer Bronchodilator Protocol below. Respiratory Therapist to perform RT Therapy Protocol Assessment initially then follow the protocol. Repeat RT Therapy Protocol Assessment PRN for score 0-3 or on second treatment, BID, and PRN for scores above 3. No Indications - adjust the frequency to every 6 hours PRN wheezing or bronchospasm, if no treatments needed after 48 hours then discontinue using Per Protocol order mode. If indication present, adjust the RT bronchodilator orders based on the Bronchodilator Assessment Score as indicated below. If a patient is on this medication at home then do not decrease Frequency below that used at home. 0-3 - enter or revise RT bronchodilator order(s) to equivalent RT Bronchodilator order with Frequency of every 4 hours PRN for wheezing or increased work of breathing using Per Protocol order mode. 4-6 - enter or revise RT Bronchodilator order(s) to two equivalent RT bronchodilator orders with one order with BID Frequency and one order with Frequency of every 4 hours PRN wheezing or increased work of breathing using Per Protocol order mode.          7-10 - enter or revise RT Bronchodilator order(s) to two equivalent RT bronchodilator orders with one order with TID Frequency and one order with Frequency of every 4 hours PRN wheezing or increased work of breathing using Per Protocol order mode. 11-13 - enter or revise RT Bronchodilator order(s) to one equivalent RT bronchodilator order with QID Frequency and an Albuterol order with Frequency of every 4 hours PRN wheezing or increased work of breathing using Per Protocol order mode. Greater than 13 - enter or revise RT Bronchodilator order(s) to one equivalent RT bronchodilator order with every 4 hours Frequency and an Albuterol order with Frequency of every 2 hours PRN wheezing or increased work of breathing using Per Protocol order mode. RT to enter RT Home Evaluation for COPD & MDI Assessment order using Per Protocol order mode.     Electronically signed by Ashlie Sharif RCP on 3/2/2023 at 2:47 AM

## 2023-03-02 NOTE — CONSULTS
Clinical Pharmacy Progress Note  Medication History    Admit Date: 3/1/2023  Consulting Provider: Dr. Velásquez     List of current medications patient is taking is complete. Home medication list in EPIC updated to reflect changes noted below.    Source of information:  Facility medication list ([facility])    Changes made to medication list:   Medications removed:  Ferrous gluconate    Medications added:   Insulin aspart TID  Furosemide prn    Medication doses adjusted:   Trulicity dose changed from 0.75 to 1.5 mg weekly      Please call with any questions.    Jamel Perry, PharmD Candidate 2023  [facility]

## 2023-03-02 NOTE — PROGRESS NOTES
Progress Note    Admit Date: 3/1/2023  Day: 203/01/23  Diet: ADULT DIET; Regular; 3 carb choices (45 gm/meal); Low Sodium (2 gm)    CC: Shortness of breath     Interval history: Overnight, patient had  consistently elevated BP  over 190/131. Early this AM, BP increased over 214/157 and was given Hydralazine. Telemetry showed sinus tachycardia with PAC. Anuradha Calles is a 38 y.o. male with PMHx of HTN,  HFpEF (50%, 2022), cognitive impairment, schizoaffective disorder, T2DM, and multiple falls presenting to the ED with a cc of shortness of breath, and admitted due to hypertensive emergency. Patient reports shortness of breath started a week ago, occurred both at rest and upon exertion and was associated with wheezing. He also reports cough that started 1 week ago and became productive about 2 days ago. He reports one episode of blood tinged mucus on Tuesday. Later, mucus became greenish and brown. He denies any any associated chest pain, fever or chills. He endorses changes in vision that also began around the same time a week ago, and were associated with headaches localized to occipital region. Vision was blurry for a couple of days with improvement to baseline. He denies any light headedness or palpitations associated with SOB. Patient endorses he is a smoker, with about 15 years smoking history. Patient also reports weakness in bilateral lower extremities. He is unsure of the timing. He denies any leg swelling, numbness or tingling. In the ED, patient had one episode of vomiting. Vitals were /135, ,  RR 18, T 98.7. He was sating on 98% on RA. He was given 10 mg Labetalol x 3 for elevated BP. Initial BG was 399 mg /dL. BNP and troponins  was elevated at 2771 and 0.10 respectively. Patient later reassessed BP was 191/135 and he received second dose of labetalol 10 mg. Patient also received Duoneb for wheezing.     Medications:     Scheduled Meds:   sodium chloride flush  5-40 mL IntraVENous 2 times per day    enoxaparin  40 mg SubCUTAneous BID    insulin lispro  0-16 Units SubCUTAneous TID WC    insulin lispro  0-4 Units SubCUTAneous Nightly    atorvastatin  80 mg Oral Nightly    spironolactone  50 mg Oral Daily    QUEtiapine  200 mg Oral Q6H    haloperidol  20 mg Oral TID    benztropine  1 mg Oral BID    [START ON 3/3/2023] carvedilol  50 mg Oral BID WC    lidocaine  1 patch TransDERmal Daily    furosemide  40 mg Oral BID    hydrALAZINE  100 mg Oral TID    aspirin  81 mg Oral Daily    hydrOXYzine HCl  25 mg Oral TID    senna  1 tablet Oral BID    insulin glargine  80 Units SubCUTAneous BID    pantoprazole  40 mg Oral BID AC    predniSONE  40 mg Oral Daily    divalproex  500 mg Oral TID    fenofibrate  160 mg Oral Daily     Continuous Infusions:   sodium chloride      dextrose       PRN Meds:sodium chloride flush, sodium chloride, ondansetron **OR** ondansetron, polyethylene glycol, acetaminophen **OR** acetaminophen, glucose, dextrose bolus **OR** dextrose bolus, glucagon (rDNA), dextrose, ipratropium-albuterol, labetalol    Objective:   Vitals:   T-max:  Patient Vitals for the past 8 hrs:   BP Temp Temp src Pulse Resp SpO2   03/02/23 1003 (!) 193/138 -- -- -- -- --   03/02/23 0745 -- -- -- 99 24 95 %   03/02/23 0743 (!) 202/154 -- -- -- -- --   03/02/23 0735 (!) 214/157 97.7 °F (36.5 °C) Oral 97 26 94 %   03/02/23 0702 (!) 171/126 -- -- 99 -- --   03/02/23 0632 -- -- -- -- -- 91 %   03/02/23 0630 -- -- -- -- -- (!) 88 %   03/02/23 0606 (!) 179/121 -- -- 99 -- --   03/02/23 0438 (!) 184/141 -- -- (!) 103 -- --   03/02/23 0308 (!) 196/140 -- -- (!) 106 -- --       Intake/Output Summary (Last 24 hours) at 3/2/2023 1030  Last data filed at 3/2/2023 0438  Gross per 24 hour   Intake 1611.79 ml   Output 325 ml   Net 1286.79 ml       Review of Systems   Constitutional:  Positive for activity change and fatigue. Negative for appetite change, chills, diaphoresis and fever.    Eyes:  Positive for visual disturbance. Respiratory:  Positive for cough, shortness of breath and wheezing. Negative for choking and chest tightness. Cardiovascular:  Negative for chest pain, palpitations and leg swelling. Gastrointestinal:  Positive for abdominal distention. Negative for abdominal pain, constipation, nausea and vomiting. Musculoskeletal:  Positive for myalgias (in legs). Neurological:  Positive for weakness and headaches. Negative for dizziness, speech difficulty, light-headedness and numbness. Physical Exam  Constitutional:       Appearance: He is obese. He is ill-appearing. HENT:      Head: Normocephalic and atraumatic. Comments: Facial plethora. Nose: Nose normal.      Mouth/Throat:      Mouth: Mucous membranes are moist.   Eyes:      Extraocular Movements: Extraocular movements intact. Cardiovascular:      Rate and Rhythm: Regular rhythm. Tachycardia present. Pulses: Normal pulses. Heart sounds: Normal heart sounds, S1 normal and S2 normal.   Pulmonary:      Effort: Tachypnea present. Breath sounds: No wheezing or rales. Chest:      Chest wall: No mass. Abdominal:      General: Abdomen is protuberant. Bowel sounds are normal. There is no distension. Palpations: Abdomen is soft. Tenderness: There is no abdominal tenderness. Musculoskeletal:      Cervical back: Normal range of motion. Right lower le+ Edema present. Left lower le+ Edema present. Feet:      Right foot:      Skin integrity: Dry skin present. Left foot:      Skin integrity: Dry skin present. Skin:     General: Skin is warm. Capillary Refill: Capillary refill takes less than 2 seconds. Comments: Red to purple striae on abdomen. Neurological:      Mental Status: He is alert and oriented to person, place, and time. Cranial Nerves: Cranial nerves 2-12 are intact. Sensory: Sensation is intact.    Psychiatric:         Mood and Affect: Mood normal. Behavior: Behavior normal.       LABS:    CBC:   Recent Labs     03/01/23 2119   WBC 5.5   HGB 12.7*   HCT 40.6      MCV 83.3     Renal:    Recent Labs     03/01/23 2119   *   K 4.0   CL 98*   CO2 26   BUN 17   CREATININE 1.1   GLUCOSE 405*   CALCIUM 8.9   ANIONGAP 11     Hepatic: No results for input(s): AST, ALT, BILITOT, BILIDIR, PROT, LABALBU, ALKPHOS in the last 72 hours. Troponin:   Recent Labs     03/01/23 2119 03/01/23 2358   TROPONINI 0.10* 0.09*     BNP: No results for input(s): BNP in the last 72 hours. Lipids: No results for input(s): CHOL, HDL in the last 72 hours. Invalid input(s): LDLCALCU, TRIGLYCERIDE  ABGs:  No results for input(s): PHART, EMK2OHA, PO2ART, QWK2XPF, BEART, THGBART, E7XRLBVF, QAH3NXN in the last 72 hours. INR: No results for input(s): INR in the last 72 hours. Lactate: No results for input(s): LACTATE in the last 72 hours. Cultures:  -----------------------------------------------------------------  RAD:   XR CHEST (2 VW)   Final Result      Interval clearing of airspace disease throughout both lungs since prior exam from February 2023. No acute chest process. Assessment/Plan:     Brenda Snyder is a 38 y.o. male with PMHx of  HTN,  HFpEF (50%, 2022), cognitive impairment, schizoaffective disorder, T2DM, morbid obesity (BMI 43.66) and multiple falls presenting to the ED with a complaint of shortness of breath, and admitted due to hypertensive emergency. Hypertensive Emergency. Likely 2/2 to bilateral adrenal nodular hyperplasia   -BP on admission was 202/135, elevated troponins to 0.1>0.09.   -CT Adrenals: 11/19/2022 with diffuse nodular adrenal hyperplasia. -CXR: no evidence of pulm edema.   -Serum cortisol, ACTH and aldosterone level  -Repeat echo. Last  echo HFpEF 50%, 11/2022  -Goal  mmHg   - Continue carvedilol 50 mg BID, hydralazine 100 mg TID, spironolactone 50 mg, furosemide 40 mg twice a day. ? Pneumonia  -Patient presented with cough productive of brownish sputum, shortness of breath, new oxygen requirement, currently on 2 L O2 NC  -CT PE with no evidence of PE, aneurysm or dissection. Patchy multifocal airspace disease predominantly in the right lower lobe and left lower lobe with some patchy groundglass minimal upper lobe changes in both lungs, most likely suggestive of atypical pneumonia or viral pneumonia or pneumonitis  -Culture, respiratory, strep pneumo antigen, urine Legionella antigen, Pro-Sandor, respiratory virus mini panel, MRSA DNA probe  -Start ceftriaxone and azithromycin       Poorly controlled Diabetes Mellitus, type II   -Blood glucose  on admission was 405. -A1c on 11/15/2022 was 12.6, repeat A1c  -Continue home insulin Lantus 80 units BID, HDSSI/hypoglycemic protocol, POC glucose checks   -Monitor blood glucose    NSTEMI  Likely demand secondary to hypertension  -Troponin 0.1> 0.09  -Trend troponin  -EKG with no acute ischemic changes.   She usually    Chronic medical conditions   CHF:   -Echo 11/22: EF 50%, indeterminate diastolic dysfunction  On Lasix, hydralazine and carvedilol at home  -Continue home medications     Schizoaffective disorder:   -On quetiapine 200 mg 3 times daily, and haloperidol 20 mg 3 times daily  -On Depakote 500 mg 3 times daily     Hyperlipidemia:   -On fenofibrate 200 mg, and atorvastatin 80 mg  -Continue home medications    Code Status: FULL CODE   FEN: Adult Diet   PPX: Lovenox  DISPO: IP    Luly Ramsey, MS3, acting as scribe   03/02/23  10:30 AM    Zane Ayala MD   PGY-1    This patient has been staffed and discussed with Alisson Lomeli MD.

## 2023-03-02 NOTE — PROGRESS NOTES
Pt admitted to room 4303 from ED. Pt alert and oriented. Hooked up to tele. Initial vitals , /124, resp 40, O2 95% RA, afebrile. Pt SOB at rest and w exertion. Wheezes present. MD ordered duonebs prn. MD states keep systolic bp 833-068. Unable to verify meds at this time, nursing home faxing over med list. Pt oriented to room and shown how to use call light. Fall precautions in place, pt low fall risk. Pt originally not complaining of chest pain that shortly later developed chest pain 7/10. Stat ekg obtained, MD reviewed, unremarkable.

## 2023-03-02 NOTE — ED PROVIDER NOTES
ED Attending Attestation Note     Date of evaluation: 3/1/2023    This patient was seen by the advance practice provider. I have seen and examined the patient, agree with the workup, evaluation, management and diagnosis. The care plan has been discussed. I have reviewed the ECG and concur with the YARA's interpretation. My assessment reveals patient comes in with a complicated history including insulin requiring diabetes hypertension and COPD/asthma. His blood sugar is elevated tonight. He is diaphoretic. He is tachycardic. DKA labs and cardiac work-up have been ordered. His chest x-ray did not show any acute pulmonary edema. Because his blood sugar was elevated and x-ray did not show any obvious fluid he received IV fluids here in the emergency department once his K is back we will treat his glucose with insulin as needed. Differential diagnosis at this point includes congestive heart failure with a more likely DKA and/or hypertensive urgency he did receive labetalol for pressure 202/135.   His pulse is [address] MD  03/01/23 2113

## 2023-03-02 NOTE — PROGRESS NOTES
Pt. Returned to floor from CT via transport.  Electronically signed by Kenan James RN on 3/2/2023 at 12:39 PM

## 2023-03-02 NOTE — DISCHARGE INSTRUCTIONS
-Please call to make appointment for follow up with your cardiologist, endocrinologist, psychiatrist and PCP      Extra Heart Failure sites:   [email or web address] --- this is American [employer] Association interactive Healthier Living with Heart Failure guidebook. Please copy and paste link into search bar. Use your mouse to scroll through the pages. Lots and lots of info / tips    HF [place] greg --- free smart phone greg available for [employer] and [employer]. Use your phone to track sodium / fluid intake, symptoms, weight, etc.    [employer] - website-- [facility] is a dialysis [employer]. All dialysis patients follow a renal diet which IS low sodium!! This website offers free seasonal cookbooks.   Each quarter, they will release 25-30 new recipes with a breakdown of calories, sodium, glucose, etc    [email or web address] -- more free recipes

## 2023-03-02 NOTE — ED PROVIDER NOTES
[address] [facility]          PHYSICIAN ASSISTANT NOTE     Date of evaluation: 3/1/2023    Chief Complaint     Hyperglycemia    History of Present Illness     HPI: Ricardo Mead is a 38 y.o. male with history of COPD, hypertension, schizoaffective disorder, CHF, diabetes who presents to the emergency department with several complaints. Starting earlier today the patient had some dizziness, shortness of breath, generalized weakness and a high blood sugar. He notes that his blood sugar was in the 400s. This reportedly feels different than his most recent hospitalization that was for shortness of breath in the setting of heart failure exacerbation. He does not feel that his legs are more swollen at this time though does feel that his abdomen is very full. He does state that he got a shot in his leg at his nursing facility that made him feel worse. He started feeling specifically worse this evening around 6 PM.  He has had some palpitations though denies any actual chest pain. With the exception of the above, there are no aggravating or alleviating factors. Review of Systems     Review of Systems   Constitutional:  Positive for fatigue. Negative for chills and fever. Respiratory:  Positive for shortness of breath. Cardiovascular:  Positive for palpitations. Negative for chest pain. Neurological:  Positive for weakness. As stated above, all other systems reviewed and are otherwise negative. Past Medical, Surgical, Family, and Social History     He has a past medical history of COPD (chronic obstructive pulmonary disease) ([address] Essential hypertension, Hypertensive heart disease with CHF ([address] MDD (major depressive disorder), Schizoaffective disorder ([address] Systolic CHF ([address] Transsexualism, and Type 2 diabetes mellitus ([address] He has no past surgical history on file. His family history is not on file. He reports that he has been smoking cigarettes.  He has never used smokeless tobacco. He reports that he does not currently use drugs after having used the following drugs: Marijuana [employer]). He reports that he does not drink alcohol.     Medications     Previous Medications    ACETAMINOPHEN (TYLENOL) 325 MG TABLET    Take 975 mg by mouth every 6 hours as needed for Pain    ASPIRIN 81 MG CHEWABLE TABLET    Take 81 mg by mouth daily    ATORVASTATIN (LIPITOR) 80 MG TABLET    Take 1 tablet by mouth nightly    BENZTROPINE (COGENTIN) 1 MG TABLET    Take 1 mg by mouth 2 times daily    CARVEDILOL (COREG) 25 MG TABLET    Take 2 tablets by mouth 2 times daily (with meals)    DIVALPROEX (DEPAKOTE SPRINKLE) 125 MG CAPSULE    Take 500 mg by mouth 3 times daily    DULAGLUTIDE (TRULICITY) 2.52 JULIEN/1.3HJ SOPN    Inject 0.75 mg into the skin once a week On Saturdays    FENOFIBRATE MICRONIZED (LOFIBRA) 200 MG CAPSULE    Take 200 mg by mouth every morning (before breakfast)    FERROUS GLUCONATE (FERGON) 324 (38 FE) MG TABLET    Take 324 mg by mouth daily (with breakfast)    FUROSEMIDE (LASIX) 40 MG TABLET    Take 1 tablet by mouth 2 times daily    HALOPERIDOL (HALDOL) 20 MG TABLET    Take 20 mg by mouth 3 times daily    HYDRALAZINE (APRESOLINE) 100 MG TABLET    Take 1 tablet by mouth 3 times daily    HYDROXYZINE HCL (ATARAX) 25 MG TABLET    Take 25 mg by mouth in the morning, at noon, and at bedtime    INSULIN ASPART (NOVOLOG FLEXPEN) 100 UNIT/ML INJECTION PEN    Inject into the skin 3 times daily (before meals) Inject per sliding scale: if 200-249 inject 4 units, if 250-299 inject 8 units, if 300-349 inject 10 units, if 350-399 inject 12 units    INSULIN GLARGINE (LANTUS;BASAGLAR) 100 UNIT/ML INJECTION PEN    Inject 80 Units into the skin in the morning and at bedtime    METFORMIN (GLUCOPHAGE) 1000 MG TABLET    Take 1,000 mg by mouth 2 times daily (with meals)    OMEPRAZOLE (PRILOSEC) 20 MG DELAYED RELEASE CAPSULE    Take 20 mg by mouth daily    QUETIAPINE (SEROQUEL) 200 MG TABLET    Take 200 mg by mouth in the morning and 200 mg at noon and 200 mg in the evening and 200 mg before bedtime. SENNA (SENOKOT) 8.6 MG TABLET    Take 1 tablet by mouth 2 times daily    SPIRONOLACTONE (ALDACTONE) 50 MG TABLET    Take 50 mg by mouth daily    VITAMIN B-12 (CYANOCOBALAMIN) 1000 MCG TABLET    Take 1,000 mcg by mouth daily       Allergies     He has no active allergies. Physical Exam     INITIAL VITALS: BP: (!) 202/135, Temp: 98.7 °F (37.1 °C), Heart Rate: (!) 109, Resp: 18, SpO2: 98 %  Physical Exam  Vitals and nursing note reviewed. Constitutional:       General: He is not in acute distress. Appearance: Normal appearance. He is obese. He is ill-appearing and diaphoretic. HENT:      Head: Normocephalic and atraumatic. Right Ear: External ear normal.      Left Ear: External ear normal.      Nose: Nose normal.      Mouth/Throat:      Mouth: Mucous membranes are dry. Pharynx: Oropharynx is clear. Eyes:      Extraocular Movements: Extraocular movements intact. Conjunctiva/sclera: Conjunctivae normal.   Cardiovascular:      Rate and Rhythm: Normal rate. Pulses: Normal pulses. Pulmonary:      Comments: Visibly tachypneic with clear lungs auscultation bilaterally. Abdominal:      General: There is no distension. Palpations: Abdomen is soft. Tenderness: There is no abdominal tenderness. There is no guarding or rebound. Musculoskeletal:         General: Normal range of motion. Cervical back: Normal range of motion. Comments: Trace non-pitting edema bilateral LE. Skin:     General: Skin is warm. Neurological:      General: No focal deficit present. Mental Status: He is alert. Mental status is at baseline.    Psychiatric:         Mood and Affect: Mood normal.         Behavior: Behavior normal.     Diagnostic Results     EKG   Interpreted in conjunction with emergency department physician Gertrude Oliveira MD  Rhythm: Sinus tachycardia with PACs  Rate: 110-120  Axis: left  Conduction: normal  ST Segments: no acute change  T Waves: no acute change  Q Waves:none  Clinical Impression: no acute changes  Comparison:  2/11/23    RADIOLOGY:  XR CHEST (2 VW)   Final Result      Interval clearing of airspace disease throughout both lungs since prior exam from February 2023. No acute chest process.           LABS:   Results for orders placed or performed during the hospital encounter of 03/01/23   CBC with Auto Differential   Result Value Ref Range    WBC 5.5 4.0 - 11.0 K/uL    RBC 4.87 4.20 - 5.90 M/uL    Hemoglobin 12.7 (L) 13.5 - 17.5 g/dL    Hematocrit 40.6 40.5 - 52.5 %    MCV 83.3 80.0 - 100.0 fL    MCH 26.1 26.0 - 34.0 pg    MCHC 31.3 31.0 - 36.0 g/dL    RDW 15.6 (H) 12.4 - 15.4 %    Platelets 560 847 - 317 K/uL    MPV 9.6 5.0 - 10.5 fL    Neutrophils % 62.0 %    Lymphocytes % 23.9 %    Monocytes % 12.0 %    Eosinophils % 0.1 %    Basophils % 2.0 %    Neutrophils Absolute 3.4 1.7 - 7.7 K/uL    Lymphocytes Absolute 1.3 1.0 - 5.1 K/uL    Monocytes Absolute 0.7 0.0 - 1.3 K/uL    Eosinophils Absolute 0.0 0.0 - 0.6 K/uL    Basophils Absolute 0.1 0.0 - 0.2 K/uL   BMP w/ Reflex to MG   Result Value Ref Range    Sodium 135 (L) 136 - 145 mmol/L    Potassium reflex Magnesium 4.0 3.5 - 5.1 mmol/L    Chloride 98 (L) 99 - 110 mmol/L    CO2 26 21 - 32 mmol/L    Anion Gap 11 3 - 16    Glucose 405 (H) 70 - 99 mg/dL    BUN 17 7 - 20 mg/dL    Creatinine 1.1 0.9 - 1.3 mg/dL    Est, Glom Filt Rate >60 >60    Calcium 8.9 8.3 - 10.6 mg/dL   BNP   Result Value Ref Range    Pro-BNP 2,771 (H) 0 - 124 pg/mL   Troponin   Result Value Ref Range    Troponin 0.10 (H) <0.01 ng/mL   Urinalysis with Reflex to Culture    Specimen: Urine   Result Value Ref Range    Color, UA Yellow Straw/Yellow    Clarity, UA Clear Clear    Glucose, Ur >=1000 (A) Negative mg/dL    Bilirubin Urine Negative Negative    Ketones, Urine Negative Negative mg/dL    Specific Gravity, UA 1.015 1.005 - 1.030    Blood, Urine Negative Negative    pH, UA 6.0 5.0 - 8.0    Protein, UA TRACE (A) Negative mg/dL    Urobilinogen, Urine 1.0 <2.0 E.U./dL    Nitrite, Urine Negative Negative    Leukocyte Esterase, Urine Negative Negative    Microscopic Examination YES     Urine Type Voided     Urine Reflex to Culture Not Indicated    Blood Gas, Venous   Result Value Ref Range    pH, Lewis 7.442 7.350 - 7.450    pCO2, Lewis 42.3 41.0 - 51.0 mmHg    pO2, Lewis 61.0 (H) 25.0 - 40.0 mmHg    HCO3, Venous 28.8 (H) 24.0 - 28.0 mmol/L    Base Excess, Lewis 4.2 (H) -2.0 - 3.0 mmol/L    O2 Sat, Lewis 90 Not established %    Carboxyhemoglobin 1.2 0.0 - 1.5 %    MetHgb, Lewis 0.4 0.0 - 1.5 %    TC02 (Calc), Lewis 30 mmol/L    Hemoglobin, Lewis, Reduced 9.40 %   Microscopic Urinalysis   Result Value Ref Range    Mucus, UA 1+ (A) None Seen /LPF    WBC, UA None seen 0 - 5 /HPF    RBC, UA None seen 0 - 4 /HPF    Epithelial Cells, UA 0-1 0 - 5 /HPF    Bacteria, UA Rare (A) None Seen /HPF   POCT Glucose   Result Value Ref Range    POC Glucose 399 (H) 70 - 99 mg/dl    Performed on ACCU-CHEK        ED BEDSIDE ULTRASOUND:  N/a    RECENT VITALS:  BP: (!) 191/135, Temp: 98.7 °F (37.1 °C), Heart Rate: (!) 103, Resp: 16, SpO2: 93 %     Procedures     N/a    ED Course     Nursing Notes, Past Medical Hx,Past Surgical Hx, Social Hx, Allergies, and Family Hx were reviewed.     The patient was given the following medications:  Orders Placed This Encounter   Medications    labetalol (NORMODYNE;TRANDATE) injection 10 mg    lactated ringers bolus    labetalol (NORMODYNE;TRANDATE) injection 10 mg       CONSULTS:  IP CONSULT TO HOSPITALIST    MEDICAL DECISION MAKING / ASSESSMENT / PLAN     Vitals:    03/01/23 2056 03/01/23 2135 03/01/23 2218 03/01/23 2300   BP: (!) 202/135  (!) 190/138 (!) 191/135   Pulse: (!) 109  (!) 111 (!) 103   Resp: 18   16   Temp: 98.7 °F (37.1 °C)      TempSrc: Oral      SpO2: 98%   93%   Weight:  (!) 352 lb 11.2 oz (160 kg)     Height: 6' 1\" (1.854 m)          Andrea Paulson Immanuel López is a 38 y.o. male who presents to the emergency department with elevated blood sugar, shortness of breath. Patient notes that he has not felt well throughout the day today. His blood sugar is also elevated into the 400s. He notes that this feels different than his most recent hospitalization for underlying heart failure exacerbation. Patient is tachycardic upon arrival into the 110s to 120s, with an initial blood pressure with a systolic in the 481G. Patient is afebrile. He is ill in appearance though nontoxic though diaphoretic. He is visibly tachypneic though with clear lungs to auscultation on exam.    Nursing had initially placed an IV in the patient with labs and medications ordered for him. He unfortunately ripped this out, and stated that he wanted to go home. I had a long discussion with the patient was able to verbally de-escalate him. I did discuss this with the on-call legal guardian, they stated that if the patient becomes agitated again and wants to leave the emergency department that they condone the placement of a medical hold on the patient to help facilitate treatment of his medical conditions. Given patient's elevated blood pressure in the setting of his tachypnea he was given an initial 10 mg of labetalol with concerns for underlying hypertensive urgency versus emergency. Initial fingerstick glucose was 399 on the patient. Given concern for underlying DKA with patient's respiratory status with concern for underlying [place] breathing patient was given 1 L of fluid as his chest x-ray showed significant improvement from his recent pulmonary edema and CHF exacerbation hospitalization -though once it was revealed that he is not in DKA his fluids were stopped so he did not receive the entire liter. Patient's renal panel revealed a glucose of 405 though with a normal anion gap of 11.   Patient likely does have a component of underlying heart failure exacerbation with a BNP of 5574.  Patient also has an elevated troponin to 0.10, though on reassessment again reiterates that he does not have any chest pain at this time. Patient's troponin could be elevated in the setting of presumed hypertensive emergency with his elevated blood pressure. Patient's blood pressure slightly improved now at 191/135 at which time patient will be given a second dose of 10 mg of labetalol. I do feel that he requires admission to the hospital for his hypertensive emergency and for appropriate treatment. Patient was discussed with the hospitalist who accepted the patient. Patient will be cared for here in the emergency department prior to bed becoming available upstairs. Medical Decision Making  Amount and/or Complexity of Data Reviewed  Labs: ordered. Radiology: ordered. ECG/medicine tests: ordered. Risk  Prescription drug management. Decision regarding hospitalization. The patient was evaluated by myself and the ED Attending Physician, Dr. Shruti Andrews. All management and disposition plans were discussed and agreed upon. Clinical Impression     1. Hypertensive emergency    2. Hyperglycemia      This note was dictated using voice-recognition software, which occasionally leads to inadvertent typographic errors.     Disposition     DISPOSITION Decision To Admit 03/01/2023 10:55:35 PM       JOEL Mendez  03/01/23 7241

## 2023-03-02 NOTE — PROGRESS NOTES
BP remains elevated 196/140. MD ordered 10 mg labetalol iv. Recheck 1 hr later BP still elevated 184/141. MD says continue to monitor and can give morning meds early.

## 2023-03-02 NOTE — ED TRIAGE NOTES
Patient arrived in the ER with c/o hyperglycemia. Patient states the his bs was 489. On arrival patient b/p was 202/135, HR-111 and appeared to be sob.

## 2023-03-03 LAB
ALBUMIN SERPL-MCNC: 3.6 G/DL (ref 3.4–5)
ALP BLD-CCNC: 86 U/L (ref 40–129)
ALT SERPL-CCNC: 71 U/L (ref 10–40)
ANION GAP SERPL CALCULATED.3IONS-SCNC: 9 MMOL/L (ref 3–16)
AST SERPL-CCNC: 22 U/L (ref 15–37)
BILIRUB SERPL-MCNC: <0.2 MG/DL (ref 0–1)
BILIRUBIN DIRECT: <0.2 MG/DL (ref 0–0.3)
BILIRUBIN, INDIRECT: ABNORMAL MG/DL (ref 0–1)
BUN BLDV-MCNC: 20 MG/DL (ref 7–20)
CALCIUM SERPL-MCNC: 9.2 MG/DL (ref 8.3–10.6)
CHLORIDE BLD-SCNC: 97 MMOL/L (ref 99–110)
CO2: 30 MMOL/L (ref 21–32)
CORTISOL TOTAL: 30.1 UG/DL
CREAT SERPL-MCNC: 1.2 MG/DL (ref 0.9–1.3)
EKG ATRIAL RATE: 109 BPM
EKG DIAGNOSIS: NORMAL
EKG P AXIS: 56 DEGREES
EKG P-R INTERVAL: 140 MS
EKG Q-T INTERVAL: 352 MS
EKG QRS DURATION: 92 MS
EKG QTC CALCULATION (BAZETT): 474 MS
EKG R AXIS: -7 DEGREES
EKG T AXIS: 92 DEGREES
EKG VENTRICULAR RATE: 109 BPM
GFR SERPL CREATININE-BSD FRML MDRD: >60 ML/MIN/{1.73_M2}
GLUCOSE BLD-MCNC: 288 MG/DL (ref 70–99)
GLUCOSE BLD-MCNC: 296 MG/DL (ref 70–99)
GLUCOSE BLD-MCNC: 315 MG/DL (ref 70–99)
GLUCOSE BLD-MCNC: 316 MG/DL (ref 70–99)
GLUCOSE BLD-MCNC: 347 MG/DL (ref 70–99)
GLUCOSE BLD-MCNC: 380 MG/DL (ref 70–99)
GLUCOSE BLD-MCNC: 407 MG/DL (ref 70–99)
HCT VFR BLD CALC: 39.3 % (ref 40.5–52.5)
HEMOGLOBIN: 12.6 G/DL (ref 13.5–17.5)
MAGNESIUM: 1.8 MG/DL (ref 1.8–2.4)
MCH RBC QN AUTO: 26.6 PG (ref 26–34)
MCHC RBC AUTO-ENTMCNC: 32 G/DL (ref 31–36)
MCV RBC AUTO: 83.2 FL (ref 80–100)
MRSA SCREEN RT-PCR: NORMAL
ORGANISM: ABNORMAL
PDW BLD-RTO: 15.2 % (ref 12.4–15.4)
PERFORMED ON: ABNORMAL
PLATELET # BLD: 183 K/UL (ref 135–450)
PMV BLD AUTO: 10.2 FL (ref 5–10.5)
POTASSIUM SERPL-SCNC: 4.2 MMOL/L (ref 3.5–5.1)
PRO-BNP: 3246 PG/ML (ref 0–124)
RBC # BLD: 4.72 M/UL (ref 4.2–5.9)
REPORT: NORMAL
RESPIRATORY PANEL PCR: ABNORMAL
SODIUM BLD-SCNC: 136 MMOL/L (ref 136–145)
TOTAL PROTEIN: 6 G/DL (ref 6.4–8.2)
VALPROIC ACID LEVEL: 50.6 UG/ML (ref 50–100)
WBC # BLD: 6.5 K/UL (ref 4–11)

## 2023-03-03 PROCEDURE — 2060000000 HC ICU INTERMEDIATE R&B

## 2023-03-03 PROCEDURE — 83735 ASSAY OF MAGNESIUM: CPT

## 2023-03-03 PROCEDURE — 6370000000 HC RX 637 (ALT 250 FOR IP): Performed by: INTERNAL MEDICINE

## 2023-03-03 PROCEDURE — 6370000000 HC RX 637 (ALT 250 FOR IP)

## 2023-03-03 PROCEDURE — 6360000002 HC RX W HCPCS

## 2023-03-03 PROCEDURE — 6370000000 HC RX 637 (ALT 250 FOR IP): Performed by: STUDENT IN AN ORGANIZED HEALTH CARE EDUCATION/TRAINING PROGRAM

## 2023-03-03 PROCEDURE — 6360000002 HC RX W HCPCS: Performed by: INTERNAL MEDICINE

## 2023-03-03 PROCEDURE — 6360000002 HC RX W HCPCS: Performed by: STUDENT IN AN ORGANIZED HEALTH CARE EDUCATION/TRAINING PROGRAM

## 2023-03-03 PROCEDURE — 80048 BASIC METABOLIC PNL TOTAL CA: CPT

## 2023-03-03 PROCEDURE — 2580000003 HC RX 258: Performed by: STUDENT IN AN ORGANIZED HEALTH CARE EDUCATION/TRAINING PROGRAM

## 2023-03-03 PROCEDURE — 85027 COMPLETE CBC AUTOMATED: CPT

## 2023-03-03 PROCEDURE — 83036 HEMOGLOBIN GLYCOSYLATED A1C: CPT

## 2023-03-03 PROCEDURE — 2500000003 HC RX 250 WO HCPCS: Performed by: INTERNAL MEDICINE

## 2023-03-03 PROCEDURE — 83880 ASSAY OF NATRIURETIC PEPTIDE: CPT

## 2023-03-03 PROCEDURE — 99232 SBSQ HOSP IP/OBS MODERATE 35: CPT | Performed by: HOSPITALIST

## 2023-03-03 PROCEDURE — 82533 TOTAL CORTISOL: CPT

## 2023-03-03 PROCEDURE — 36415 COLL VENOUS BLD VENIPUNCTURE: CPT

## 2023-03-03 PROCEDURE — 93010 ELECTROCARDIOGRAM REPORT: CPT | Performed by: INTERNAL MEDICINE

## 2023-03-03 PROCEDURE — 94640 AIRWAY INHALATION TREATMENT: CPT

## 2023-03-03 PROCEDURE — 80076 HEPATIC FUNCTION PANEL: CPT

## 2023-03-03 PROCEDURE — 93005 ELECTROCARDIOGRAM TRACING: CPT | Performed by: INTERNAL MEDICINE

## 2023-03-03 PROCEDURE — 80164 ASSAY DIPROPYLACETIC ACD TOT: CPT

## 2023-03-03 RX ORDER — HYDRALAZINE HYDROCHLORIDE 20 MG/ML
20 INJECTION INTRAMUSCULAR; INTRAVENOUS EVERY 6 HOURS PRN
Status: DISCONTINUED | OUTPATIENT
Start: 2023-03-03 | End: 2023-03-06 | Stop reason: HOSPADM

## 2023-03-03 RX ORDER — INSULIN LISPRO 100 [IU]/ML
25 INJECTION, SOLUTION INTRAVENOUS; SUBCUTANEOUS
Status: DISCONTINUED | OUTPATIENT
Start: 2023-03-04 | End: 2023-03-04

## 2023-03-03 RX ORDER — IPRATROPIUM BROMIDE AND ALBUTEROL SULFATE 2.5; .5 MG/3ML; MG/3ML
1 SOLUTION RESPIRATORY (INHALATION)
Status: DISCONTINUED | OUTPATIENT
Start: 2023-03-03 | End: 2023-03-03

## 2023-03-03 RX ORDER — HYDRALAZINE HYDROCHLORIDE 20 MG/ML
10 INJECTION INTRAMUSCULAR; INTRAVENOUS ONCE
Status: COMPLETED | OUTPATIENT
Start: 2023-03-03 | End: 2023-03-03

## 2023-03-03 RX ORDER — INSULIN LISPRO 100 [IU]/ML
17 INJECTION, SOLUTION INTRAVENOUS; SUBCUTANEOUS
Status: DISCONTINUED | OUTPATIENT
Start: 2023-03-03 | End: 2023-03-03

## 2023-03-03 RX ORDER — POLYETHYLENE GLYCOL 3350 17 G/17G
17 POWDER, FOR SOLUTION ORAL DAILY
Status: DISCONTINUED | OUTPATIENT
Start: 2023-03-03 | End: 2023-03-06 | Stop reason: HOSPADM

## 2023-03-03 RX ORDER — INSULIN LISPRO 100 [IU]/ML
10 INJECTION, SOLUTION INTRAVENOUS; SUBCUTANEOUS ONCE
Status: DISCONTINUED | OUTPATIENT
Start: 2023-03-03 | End: 2023-03-03

## 2023-03-03 RX ORDER — NIFEDIPINE 30 MG/1
60 TABLET, FILM COATED, EXTENDED RELEASE ORAL DAILY
Status: DISCONTINUED | OUTPATIENT
Start: 2023-03-04 | End: 2023-03-04

## 2023-03-03 RX ORDER — NIFEDIPINE 30 MG/1
30 TABLET, FILM COATED, EXTENDED RELEASE ORAL DAILY
Status: DISCONTINUED | OUTPATIENT
Start: 2023-03-03 | End: 2023-03-03

## 2023-03-03 RX ORDER — IPRATROPIUM BROMIDE AND ALBUTEROL SULFATE 2.5; .5 MG/3ML; MG/3ML
1 SOLUTION RESPIRATORY (INHALATION) EVERY 4 HOURS PRN
Status: DISCONTINUED | OUTPATIENT
Start: 2023-03-03 | End: 2023-03-06 | Stop reason: HOSPADM

## 2023-03-03 RX ADMIN — SPIRONOLACTONE 50 MG: 50 TABLET ORAL at 09:35

## 2023-03-03 RX ADMIN — IPRATROPIUM BROMIDE AND ALBUTEROL 1 PUFF: 20; 100 SPRAY, METERED RESPIRATORY (INHALATION) at 20:57

## 2023-03-03 RX ADMIN — HYDROXYZINE HYDROCHLORIDE 25 MG: 25 TABLET ORAL at 21:27

## 2023-03-03 RX ADMIN — ASPIRIN 81 MG 81 MG: 81 TABLET ORAL at 09:36

## 2023-03-03 RX ADMIN — HYDROXYZINE HYDROCHLORIDE 25 MG: 25 TABLET ORAL at 09:36

## 2023-03-03 RX ADMIN — ENOXAPARIN SODIUM 40 MG: 100 INJECTION SUBCUTANEOUS at 01:42

## 2023-03-03 RX ADMIN — PANTOPRAZOLE SODIUM 40 MG: 40 TABLET, DELAYED RELEASE ORAL at 05:11

## 2023-03-03 RX ADMIN — CARVEDILOL 50 MG: 25 TABLET, FILM COATED ORAL at 17:23

## 2023-03-03 RX ADMIN — INSULIN LISPRO 16 UNITS: 100 INJECTION, SOLUTION INTRAVENOUS; SUBCUTANEOUS at 14:50

## 2023-03-03 RX ADMIN — DIVALPROEX SODIUM 500 MG: 125 CAPSULE, COATED PELLETS ORAL at 14:42

## 2023-03-03 RX ADMIN — DIVALPROEX SODIUM 500 MG: 125 CAPSULE, COATED PELLETS ORAL at 21:23

## 2023-03-03 RX ADMIN — HALOPERIDOL 20 MG: 5 TABLET ORAL at 14:50

## 2023-03-03 RX ADMIN — HYDRALAZINE HYDROCHLORIDE 100 MG: 100 TABLET, FILM COATED ORAL at 09:35

## 2023-03-03 RX ADMIN — DIVALPROEX SODIUM 500 MG: 125 CAPSULE, COATED PELLETS ORAL at 09:36

## 2023-03-03 RX ADMIN — HYDRALAZINE HYDROCHLORIDE 100 MG: 100 TABLET, FILM COATED ORAL at 14:43

## 2023-03-03 RX ADMIN — QUETIAPINE FUMARATE 200 MG: 200 TABLET ORAL at 17:23

## 2023-03-03 RX ADMIN — PANTOPRAZOLE SODIUM 40 MG: 40 TABLET, DELAYED RELEASE ORAL at 17:23

## 2023-03-03 RX ADMIN — BENZTROPINE MESYLATE 1 MG: 1 TABLET ORAL at 09:35

## 2023-03-03 RX ADMIN — NIFEDIPINE 30 MG: 30 TABLET, EXTENDED RELEASE ORAL at 09:36

## 2023-03-03 RX ADMIN — QUETIAPINE FUMARATE 200 MG: 200 TABLET ORAL at 05:11

## 2023-03-03 RX ADMIN — FUROSEMIDE 80 MG: 10 INJECTION, SOLUTION INTRAMUSCULAR; INTRAVENOUS at 09:36

## 2023-03-03 RX ADMIN — INSULIN LISPRO 17 UNITS: 100 INJECTION, SOLUTION INTRAVENOUS; SUBCUTANEOUS at 14:50

## 2023-03-03 RX ADMIN — ATORVASTATIN CALCIUM 80 MG: 80 TABLET, FILM COATED ORAL at 21:26

## 2023-03-03 RX ADMIN — QUETIAPINE FUMARATE 200 MG: 200 TABLET ORAL at 23:19

## 2023-03-03 RX ADMIN — INSULIN GLARGINE 45 UNITS: 100 INJECTION, SOLUTION SUBCUTANEOUS at 21:39

## 2023-03-03 RX ADMIN — INSULIN GLARGINE 45 UNITS: 100 INJECTION, SOLUTION SUBCUTANEOUS at 09:48

## 2023-03-03 RX ADMIN — HYDROXYZINE HYDROCHLORIDE 25 MG: 25 TABLET ORAL at 14:43

## 2023-03-03 RX ADMIN — HALOPERIDOL 20 MG: 5 TABLET ORAL at 21:25

## 2023-03-03 RX ADMIN — INSULIN LISPRO 15 UNITS: 100 INJECTION, SOLUTION INTRAVENOUS; SUBCUTANEOUS at 09:47

## 2023-03-03 RX ADMIN — SENNOSIDES 8.6 MG: 8.6 TABLET, COATED ORAL at 21:24

## 2023-03-03 RX ADMIN — QUETIAPINE FUMARATE 200 MG: 200 TABLET ORAL at 09:35

## 2023-03-03 RX ADMIN — BENZTROPINE MESYLATE 1 MG: 1 TABLET ORAL at 21:26

## 2023-03-03 RX ADMIN — HYDRALAZINE HYDROCHLORIDE 100 MG: 100 TABLET, FILM COATED ORAL at 21:26

## 2023-03-03 RX ADMIN — SENNOSIDES 8.6 MG: 8.6 TABLET, COATED ORAL at 09:47

## 2023-03-03 RX ADMIN — SODIUM CHLORIDE, PRESERVATIVE FREE 10 ML: 5 INJECTION INTRAVENOUS at 09:47

## 2023-03-03 RX ADMIN — INSULIN LISPRO 8 UNITS: 100 INJECTION, SOLUTION INTRAVENOUS; SUBCUTANEOUS at 09:47

## 2023-03-03 RX ADMIN — HALOPERIDOL 20 MG: 5 TABLET ORAL at 09:35

## 2023-03-03 RX ADMIN — ENOXAPARIN SODIUM 40 MG: 100 INJECTION SUBCUTANEOUS at 14:43

## 2023-03-03 RX ADMIN — FENOFIBRATE 160 MG: 160 TABLET, FILM COATED ORAL at 09:36

## 2023-03-03 RX ADMIN — CARVEDILOL 50 MG: 25 TABLET, FILM COATED ORAL at 10:00

## 2023-03-03 RX ADMIN — SODIUM CHLORIDE, PRESERVATIVE FREE 10 ML: 5 INJECTION INTRAVENOUS at 21:27

## 2023-03-03 RX ADMIN — HYDRALAZINE HYDROCHLORIDE 10 MG: 20 INJECTION INTRAMUSCULAR; INTRAVENOUS at 07:03

## 2023-03-03 RX ADMIN — INSULIN LISPRO 12 UNITS: 100 INJECTION, SOLUTION INTRAVENOUS; SUBCUTANEOUS at 18:48

## 2023-03-03 RX ADMIN — LABETALOL HYDROCHLORIDE 10 MG: 5 INJECTION, SOLUTION INTRAVENOUS at 05:11

## 2023-03-03 ASSESSMENT — PAIN SCALES - GENERAL
PAINLEVEL_OUTOF10: 0
PAINLEVEL_OUTOF10: 0

## 2023-03-03 NOTE — PROGRESS NOTES
Resident physician notified of BG of 407, patient being non-compliant with diet. Patient educated on importance of following carb control diet. Will continue to monitor BG as ordered.

## 2023-03-03 NOTE — PROGRESS NOTES
Pt becoming increasing agitated after starting IV antibiotics, stating that he feels like he's dying. Pt complaining of blurred vision and feeling like \"there are tubes twisting in my stomach\". Pt now refusing to take any more oral medications because he feels like we are poisoning him. Unable to give Norvasc for SBP of 199. MD notified.      Electronically signed by Jeffrie Curling, RN on 3/2/2023

## 2023-03-03 NOTE — PROGRESS NOTES
Physician Progress Note      PATIENT:               Lm Garcia  CSN #:                  [id number]  :                       1980  ADMIT DATE:       3/1/2023 8:44 PM  DISCH DATE:  RESPONDING  PROVIDER #:        Harish Martinez [facility]          QUERY TEXT:    Patient admitted with hypertensive emergency. Noted documentation of type II   MI in the H&P. In order to support the diagnosis of type II MI, please refer   to 4th universal definition of MI below and include additional clinical   indicators in your documentation. ? Or please document if the diagnosis of type   II MI has been ruled out after study. The medical record reflects the following:  ?? Risk Factors: 37 yo w/ hypertension, positive Covid, possible pneumonia  ? ? Clinical Indicators: Per PN 3/3: NSTEMI, Likely demand secondary to   hypertension. Per ED: Patient also has an elevated troponin to 0.10, though on   reassessment again reiterates that he does not have any chest pain. EKG with   no acute ischemic changes. Troponin 0.10 - 0.09.  ?? Treatment: EKG, trend troponins, Telemetry    Fourth Universal Definition of Myocardial Infarction:  Clearly separates MI   from myocardial injury. Patients with elevated blood troponin levels but   without clinical evidence of ischemia are said to have had a myocardial   injury. ? To have a myocardial infarction requires both an elevated troponin   blood test along with at least one of the following:  - Symptoms of acute myocardial ischemia (Types 1 - 5 MI)  - Clinical evidence of ischemia, as evidenced in an electrocardiogram (EKG)   showing new ischemic changes (Type 1, Type 2, Type 3, or Type 4a MI)  - Development of pathological Q waves (Types 1 - 5 MI)  - Imaging evidence of new loss of viable myocardium or new regional wall   motion abnormality in a pattern consistent with an ischemic etiology (Types 1   - 5 MI)  Options provided:  -- MI type II due to hypertension present as evidenced by, Please document   indicators of myocardial infarction. -- MI type II ruled out after study and demand ischemia due to hypertension   confirmed  -- Other - I will add my own diagnosis  -- Disagree - Not applicable / Not valid  -- Disagree - Clinically unable to determine / Unknown  -- Refer to Clinical Documentation Reviewer    PROVIDER RESPONSE TEXT:    Type II MI was ruled out after study and demand ischemia confirmed.     Query created by: Hannah Caba on 3/3/2023 12:33 PM      Electronically signed by:  Parveen BREWER 3/3/2023 5:58 PM

## 2023-03-03 NOTE — PROGRESS NOTES
Pt moved closer to nurses station (from [phone] to [phone]) for closer monitoring. Sitter still at bedside. Pt appears calm. Will continue to monitor.     Electronically signed by Nelly Toussaint RN on 3/2/2023

## 2023-03-03 NOTE — PROGRESS NOTES
Internal med paged that pt was very fearful/refusing whitfield catheter placement and VBG. Advised to hold on VBG for now. Urojet ordered and discussed at length with pt. Became amenable and whitfield placed at 2200.     2300- BP-191/120 after scheduled antihypertensives and 10mg IV labetalol. Additional 10mg Iv labetalol administered as ordered. 0130 Updated that pt is positive for COVID,  and /114.     0500- /155 on multiple readings, 10mg labetalol administered and MD paged. 10mg hydralazine  administered as ordered.

## 2023-03-03 NOTE — PROGRESS NOTES
Pt continuously getting out of bed without assistance. Avasys placed at bedside.    Electronically signed by Abdifatah Paulson RN on 3/2/2023

## 2023-03-03 NOTE — CONSULTS
Psychiatry Initial Consultation    Patient Name: Erlinda Burgos  MRN: [id number]  Admission Date: 3/1/2023    Reason for Consult: Schizoaffective do and acute anxiety    HPI:   Erlinda Burgos is a 38 y.o. male who presented to the hospital on 03/01/2023 with a chief complaint of hyperglycemia. Per ED documentation, Starting earlier today the patient had some dizziness, shortness of breath, generalized weakness and a high blood sugar. He notes that his blood sugar was in the 400s. This reportedly feels different than his most recent hospitalization that was for shortness of breath in the setting of heart failure exacerbation. He does not feel that his legs are more swollen at this time though does feel that his abdomen is very full. He does state that he got a shot in his leg at his nursing facility that made him feel worse. He started feeling specifically worse this evening around 6 PM.  He has had some palpitations though denies any actual chest pain. Met with Maricruz Courtney. He is alert and oriented to person, place, and year, vaguely to situation. Appears to have limited insight and limited understanding of his current situation. He endorses anxiety at baseline but had difficulty when asked to describe his symptoms. He reports that he is supposed to move from his facility in a few days which he is anxious about as he likes where he currently lives. He is unsure what prompted the move but notes his guardian(s) made the decision. He also expressed some anxiety about not getting all his home medications. Denies recent agitation when asked about last night. When asked about current anxiety he states it is \"okay\". Notes mood is \"good\" and he denies issues with depression. Denies suicidal ideation. Denies current hallucinations. States that he is currently established with an outpatient psychiatrist (Dr. Solo Olson).  He states there has been no recent dose changes and overall he feels that his medications are helpful. States Ativan has helped with anxiety in the past.     Current outpatient psychiatric medications include the following:  Depakote Sprinkles 500 mg TID  Haldol 20 mg TID  Atarax 25 mg TID  Seroquel 200 mg Q6H  Benztropine 1 mg BID    Labwork and imaging indicates the following:  No depakote or liver values checked upon admission - ordered  EKG on 03/03/2023 showed QTc of 474    Duration: Ongoing   Severity: Moderate-Severe  Context: Stress, medical issues   Associated Symptoms: As above    Past Psychiatric History:    Previous Diagnoses: Schizophrenia, schizoaffective disorder, MDD, anxiety, borderline PD, antisocial PD, paranoid PD, gender identity disorder, MRDD, dementia with behavior disturbance  Previous Hospitalizations: [facility] (2020, 2014 x4), [facility]  Outpatient Treatment: Established with Dr. Omer Sanchez for outpatient psychiatry services  Past Medication Trials: Ativan, Cogentin, Depakote, Haldol, Hydroxyzine, Lithium, Seroquel, Zyprexa   Suicidality: Denies recent/current SI; no known history of attempts  History of Violence: History of violence towards hospital staff    Past Medical History:  Past Medical History:   Diagnosis Date    COPD (chronic obstructive pulmonary disease) ([facility]     Essential hypertension     Hypertensive heart disease with CHF ([facility] [facility]     MDD (major depressive disorder)     Schizoaffective disorder ([facility] [facility]     Systolic CHF ([facility] [address]     Transsexualism     Type 2 diabetes mellitus ([facility] [address]      Home Medications:  Prior to Admission medications    Medication Sig Start Date End Date Taking?  Authorizing Provider   furosemide (LASIX) 40 MG tablet Take 40 mg by mouth daily As needed for fluid retention   Yes Historical Provider, MD   insulin aspart (NOVOLOG) 100 UNIT/ML injection pen Inject 15 Units into the skin 3 times daily (before meals)   Yes Historical Provider, MD   furosemide (LASIX) 40 MG tablet Take 1 tablet by mouth 2 times daily 2/21/23   KAREN Soils - CNP hydrALAZINE (APRESOLINE) 100 MG tablet Take 1 tablet by mouth 3 times daily 2/21/23   KAREN Solis CNP   carvedilol (COREG) 25 MG tablet Take 2 tablets by mouth 2 times daily (with meals) 2/15/23   Benitez Caceres MD   insulin glargine (LANTUS;BASAGLAR) 100 UNIT/ML injection pen Inject 80 Units into the skin in the morning and at bedtime 2/15/23   Benitez Caceres MD   spironolactone (ALDACTONE) 50 MG tablet Take 50 mg by mouth daily    Historical Provider, MD   hydrOXYzine HCl (ATARAX) 25 MG tablet Take 25 mg by mouth in the morning, at noon, and at bedtime    Historical Provider, MD   atorvastatin (LIPITOR) 80 MG tablet Take 1 tablet by mouth nightly 11/21/22   Trina Griffiths MD   aspirin 81 MG chewable tablet Take 81 mg by mouth daily    Historical Provider, MD   benztropine (COGENTIN) 1 MG tablet Take 1 mg by mouth 2 times daily    Historical Provider, MD   vitamin B-12 (CYANOCOBALAMIN) 1000 MCG tablet Take 1,000 mcg by mouth daily    Historical Provider, MD   divalproex (DEPAKOTE SPRINKLE) 125 MG capsule Take 500 mg by mouth 3 times daily    Historical Provider, MD   fenofibrate micronized (LOFIBRA) 200 MG capsule Take 200 mg by mouth every morning (before breakfast)    Historical Provider, MD   haloperidol (HALDOL) 20 MG tablet Take 20 mg by mouth 3 times daily    Historical Provider, MD   metFORMIN (GLUCOPHAGE) 1000 MG tablet Take 1,000 mg by mouth 2 times daily (with meals)    Historical Provider, MD   insulin aspart (NOVOLOG FLEXPEN) 100 UNIT/ML injection pen Inject into the skin 3 times daily (before meals) Inject per sliding scale: if 200-249 inject 4 units, if 250-299 inject 8 units, if 300-349 inject 10 units, if 350-399 inject 12 units    Historical Provider, MD   omeprazole (PRILOSEC) 20 MG delayed release capsule Take 20 mg by mouth daily    Historical Provider, MD   senna (SENOKOT) 8.6 MG tablet Take 1 tablet by mouth 2 times daily    Historical Provider, MD   QUEtiapine (SEROQUEL) 200 MG tablet Take 200 mg by mouth in the morning and 200 mg at noon and 200 mg in the evening and 200 mg before bedtime. Historical Provider, MD   dulaglutide (TRULICITY) 1.5 EG/7.1DG SC injection Inject 1.5 mg into the skin once a week On Saturdays    Historical Provider, MD   acetaminophen (TYLENOL) 325 MG tablet Take 975 mg by mouth every 6 hours as needed for Pain    Historical Provider, MD     Chemical Dependency History:   Tobacco: Per Epic, every day smoker  Alcohol: Chart review indicates history of alcohol use  Illicit: Chart review indicates history of cannabis use    Family Hx:    No family history on file.      Social Hx:   Marital Status: Single  Children: None listed in [facility]  Housing: LT  Vocational: Disabled  Abuse/Trauma: None disclosed  Legal: None disclosed    Current Medications Ordered:   NIFEdipine  30 mg Oral Daily    insulin glargine  45 Units SubCUTAneous BID    And    insulin glargine  45 Units SubCUTAneous BID    sodium chloride flush  5-40 mL IntraVENous 2 times per day    enoxaparin  40 mg SubCUTAneous BID    insulin lispro  0-16 Units SubCUTAneous TID WC    insulin lispro  0-4 Units SubCUTAneous Nightly    atorvastatin  80 mg Oral Nightly    spironolactone  50 mg Oral Daily    haloperidol  20 mg Oral TID    benztropine  1 mg Oral BID    lidocaine  1 patch TransDERmal Daily    hydrALAZINE  100 mg Oral TID    aspirin  81 mg Oral Daily    hydrOXYzine HCl  25 mg Oral TID    senna  1 tablet Oral BID    pantoprazole  40 mg Oral BID AC    divalproex  500 mg Oral TID    fenofibrate  160 mg Oral Daily    carvedilol  50 mg Oral BID WC    nicotine  1 patch TransDERmal Daily    insulin lispro  15 Units SubCUTAneous TID WC    furosemide  80 mg IntraVENous BID    QUEtiapine  200 mg Oral Q6H      PRN Meds: sodium chloride flush, sodium chloride, ondansetron **OR** ondansetron, polyethylene glycol, glucose, dextrose bolus **OR** dextrose bolus, glucagon (rDNA), dextrose, ipratropium-albuterol, labetalol, acetaminophen, lidocaine     ROS: See Medical H&PE     PE:    BP (!) 164/127   Pulse (!) 108   Temp 98.9 °F (37.2 °C)   Resp 24   Ht 6' 1\" (1.854 m)   Wt (!) 330 lb 14.6 oz (150.1 kg)   SpO2 91%   BMI 43.66 kg/m²       Motor / Gait: Observed laying in bed, gait deferred  AIMS: 0    Mental Status Examination:    Appearance: AAM, appears stated age, lying in bed, wearing hospital attire, fair grooming and fair hygiene   Behavior/Attitude Toward Examiner: Cooperative, fair eye contact  Speech: Spontaneous, normal rate, normal volume  Mood: \"Good\"  Affect: Mood congruent   Thought Processes: Logical  Thought Content: Denies SI/HI, no overt delusions or paranoia voiced  Perceptions: Denies AVH, did not appear to be RTIS  Attention: Intact to interview  Cognition: Alert and oriented to person, place, time (year), and vaguely to situation  Insight: Impaired insight   Judgment: Impaired judgment      LAB: Reviewed all labs obtained during admission to date. Dx:   Primary Psychiatric (DSM V) Diagnosis: Schizoaffective disorder  Secondary Psychiatric (DSM V) Diagnoses: Anxiety  Chemical Dependency Diagnoses: Tobacco use, history of alcohol and cannabis use    Assessment  Reviewed nursing and ancillary staff notes since arrival to hospital, reviewed previous records and records available through [facility]. Evaluated medications and assessed for side effects and effectiveness. Assessed patient's educational needs including reviewing plan of care, medications, and diagnosis. Recommendations:    Emily Johnson does not require inpatient psychiatric admission at this time. He has a significant and complex psychiatric history, including being on high dose dual antipsychotic therapies. I am hesitant to adjust his home psychiatric medications at this time due to his complex regimen and risk of psychiatric decompensation.  He is established with an outpatient psychiatrist and should follow up with them for close medication adjustments, management, and monitoring. If PRN is needed for agitation, recommend utilizing Ativan if his blood pressure, pulse, and cognitive status allows as he is already on multiple high dose antipsychotics and his QTc has been prolonged during this admission. Daily EKGs ordered. If QTc worsens, recommend decreasing Seroquel to TID or holding. Depakote level and hepatic panel ordered. Spent >80 minutes face to face with patient of which >50% was spent counseling and providing education regarding diagnosis, treatment options, and prognosis. Thank you for consult. Please do not hesitate to contact provider if there are additional questions regarding patient.     Mikel German, MPH, PMHNP-BC  03/03/23

## 2023-03-03 NOTE — PROGRESS NOTES
Pt SOB, RR 40. MD ordered morphine for air hunger and to assist with anxiety. Pt tolerated well. Pt appears more at peace with sitter at bedside. RR decreased to 22.  Electronically signed by Nancy Man RN on 3/2/2023

## 2023-03-03 NOTE — PLAN OF CARE
Problem: Discharge Planning  Goal: Discharge to home or other facility with appropriate resources  Outcome: Not Progressing  Flowsheets (Taken 3/2/2023 2031)  Discharge to home or other facility with appropriate resources:   Identify barriers to discharge with patient and caregiver   Identify discharge learning needs (meds, wound care, etc)  Note: Psych eval placed per MD. Pietr Grande at bedside. Problem: ABCDS Injury Assessment  Goal: Absence of physical injury  Outcome: Progressing  Flowsheets (Taken 3/2/2023 1038 by Karolyn Clark RN)  Absence of Physical Injury: Implement safety measures based on patient assessment     Problem: Safety - Adult  Goal: Free from fall injury  Outcome: Progressing  Flowsheets (Taken 3/2/2023 2031)  Free From Fall Injury:   Instruct family/caregiver on patient safety   Based on caregiver fall risk screen, instruct family/caregiver to ask for assistance with transferring infant if caregiver noted to have fall risk factors  Note: Pt attempting to get up without assistance, sitter at bedside. Problem: Pain  Goal: Verbalizes/displays adequate comfort level or baseline comfort level  Outcome: Progressing     Problem: Cardiovascular - Adult  Goal: Maintains optimal cardiac output and hemodynamic stability  Outcome: Progressing  Flowsheets (Taken 3/2/2023 2031)  Maintains optimal cardiac output and hemodynamic stability:   Monitor blood pressure and heart rate   Monitor urine output and notify Licensed Independent Practitioner for values outside of normal range   Assess for signs of decreased cardiac output  Note: Iv lasix given per MD. BP management with schedule and PRN meds. Goal -180.

## 2023-03-03 NOTE — PROGRESS NOTES
Office : [phone]     Fax :[phone]         Renal Progress Note  Subjective:   Admit Date: 3/1/2023     SUKHI Mead is a 38 y.o. male with prior history of COPD, hypertension, schizoaffective disorder, CHF, diabetes who presents to the ED with complaint of Dizziness, SOB and Hyperglycemia since morning of presentation. Seemed acute in onset with progressive worsening. Had assoc N/V prior to presentation. Pt was recently admitted for CHF exacerbation and followed up with cardiology since discharge. Pt remained hypertensive and his Hydralazine and Lasix doses were increased. He was seen by nephrology outpt who recommended hypercortisolism workup given clinical signs and referral to Endo. Labs were not completed. Pt was not seen by endo yet. Pt states that his presenting symptoms have improved. He endorses facial plethora for more than several weeks. He also endorses LE edema, and being unable to control his blood sugars. Interval History:     BP control improving with current BP regimen      Seen at the bedside, pretty tired. Pt had apparently fallen to his knees after getting out of bed without any aid. He now has a sitter at the bedside. His COVID came back positive. His BP's are slightly improved but still up to 447 systolic in the early AM. Otherwise, he has no acute complaints. DIET ADULT DIET; Regular; 3 carb choices (45 gm/meal);  Low Sodium (2 gm)  Medications:   Scheduled Meds:   NIFEdipine  30 mg Oral Daily    insulin glargine  45 Units SubCUTAneous BID    And    insulin glargine  45 Units SubCUTAneous BID    sodium chloride flush  5-40 mL IntraVENous 2 times per day    enoxaparin  40 mg SubCUTAneous BID    insulin lispro  0-16 Units SubCUTAneous TID WC    insulin lispro  0-4 Units SubCUTAneous Nightly    atorvastatin  80 mg Oral Nightly    spironolactone  50 mg Oral Daily    haloperidol  20 mg Oral TID    benztropine  1 mg Oral BID    lidocaine  1 patch TransDERmal Daily    hydrALAZINE  100 mg Oral TID    aspirin  81 mg Oral Daily    hydrOXYzine HCl  25 mg Oral TID    senna  1 tablet Oral BID    pantoprazole  40 mg Oral BID AC    divalproex  500 mg Oral TID    fenofibrate  160 mg Oral Daily    carvedilol  50 mg Oral BID WC    nicotine  1 patch TransDERmal Daily    insulin lispro  15 Units SubCUTAneous TID WC    furosemide  80 mg IntraVENous BID    QUEtiapine  200 mg Oral Q6H     Continuous Infusions:   sodium chloride      dextrose         Labs:  CBC:   Recent Labs     03/01/23 2119   WBC 5.5   HGB 12.7*        BMP:    Recent Labs     03/01/23 2119 03/02/23  1633   * 132*   K 4.0 4.1   CL 98* 95*   CO2 26 27   BUN 17 18   CREATININE 1.1 1.0   GLUCOSE 405* 433*     Ca/Mg/Phos:   Recent Labs     03/01/23 2119 03/02/23  1633   CALCIUM 8.9 8.9   PHOS  --  3.0     Hepatic: No results for input(s): AST, ALT, ALB, BILITOT, ALKPHOS in the last 72 hours. Troponin:   Recent Labs     03/01/23 2119 03/01/23  2358   TROPONINI 0.10* 0.09*     BNP: No results for input(s): BNP in the last 72 hours. Lipids: No results for input(s): CHOL, TRIG, HDL, LDLCALC, LABVLDL in the last 72 hours. ABGs: No results for input(s): PHART, PO2ART, GOR9POS in the last 72 hours. INR: No results for input(s): INR in the last 72 hours.   UA:  Recent Labs     03/01/23  2224   COLORU Yellow   CLARITYU Clear   GLUCOSEU >=1000*   BILIRUBINUR Negative   KETUA Negative   SPECGRAV 1.015   BLOODU Negative   PHUR 6.0   PROTEINU TRACE*   UROBILINOGEN 1.0   NITRU Negative   LEUKOCYTESUR Negative   LABMICR YES   URINETYPE Voided      Urine Microscopic:   Recent Labs     03/01/23  2224   BACTERIA Rare*   WBCUA None seen   RBCUA None seen   EPIU 0-1     Urine Culture: No results for input(s): LABURIN in the last 72 hours. Urine Chemistry: No results for input(s): Ronan Boston, PROTEINUR, NAUR in the last 72 hours. Objective:   Vitals: BP (!) 164/127   Pulse (!) 108   Temp 98.9 °F (37.2 °C)   Resp 24   Ht 6' 1\" (1.854 m)   Wt (!) 330 lb 14.6 oz (150.1 kg)   SpO2 91%   BMI 43.66 kg/m²    Wt Readings from Last 3 Encounters:   03/02/23 (!) 330 lb 14.6 oz (150.1 kg)   02/21/23 (!) 333 lb (151 kg)   02/15/23 (!) 317 lb 0.3 oz (143.8 kg)      24HR INTAKE/OUTPUT:    Intake/Output Summary (Last 24 hours) at 3/3/2023 0831  Last data filed at 3/3/2023 0242  Gross per 24 hour   Intake 500 ml   Output 3425 ml   Net -2925 ml     Constitutional:  OAA X3 NAD, morbidly obese, very fatigued  Skin: no rash, turgor wnl  Heent:  eomi, mmm, moon facies, facial flushing, buffalo hump  Neck: no bruits or jvd noted  Cardiovascular:  S1, S2 without m/r/g  Respiratory: CTA B without w/r/r  Abdomen:  +bs, soft, nt, nd, increased abdominal girth, no noticeable striae  Ext: + lower extremity edema  Psychiatric: mood and affect appropriate  Musculoskeletal:  Rom, muscular strength intact    Assessment and Plan:       IMAGING:  CT CHEST PULMONARY EMBOLISM W CONTRAST   Final Result      1. No evidence of any pulmonary thromboembolus, aneurysm or dissection. 2. Patchy multifocal airspace disease predominantly in the right lower lobe and left lower lobe with some patchy groundglass minimal upper lobe changes in both lungs, most likely suggestive of atypical pneumonia or viral pneumonia or pneumonitis. Correlate clinically      XR CHEST (2 VW)   Final Result      Interval clearing of airspace disease throughout both lungs since prior exam from February 2023. No acute chest process.           Assessment/Plan   Hypertensive urgency  As evidenced by elevated BP >180/120, no end-organ damage  BP's during last admission ranged 150-170's  BP in recent (2/21/23) cardiology office was 160/120  Possibly 2/2 hypercortisolism     Anemia     Acid- base/ Electrolyte imbalance     Uncontrolled DM with hyperglycemia     Obesity    COVID +     PLAN:    Overall BP control improving with current BP meds , will continue with current regimen    Would not aggressively drop BP's  Better glucose control can aid in decreasing BP's    Continue Hydralazine 100mg TID (recently increased), Carvedilol 50mg BID, spironolactone 50mg qd      Continue Lasix 40mg BID (recently increased)  Nifedipine 30mg qd added starting this AM  Aldosterone level/metanephrines already ordered and pending, will add renin to obtain ratio - pending  Will order timed AM cortisol, Urine free cortisol (will need to be >3x ULN) -pending  Can also perform an overnight dexa suppression test  Will continue to monitor  Labs in AM        Jewel Rodriguez MD  [facility] [address] [place], [place] [id number] 18 07       Plan of care discussed with Resident  Agree with above assessment and plan

## 2023-03-03 NOTE — CARE COORDINATION
Case Management Assessment  Initial Evaluation    Date/Time of Evaluation: 3/3/2023 2:23 PM  Assessment Completed by: Iram Caceres RN    If patient is discharged prior to next notation, then this note serves as note for discharge by case management. Patient Name: Avelino Richard                   YOB: 1980  Diagnosis: Hyperglycemia [R73.9]  Hypertensive emergency [I16.1]                   Date / Time: 3/1/2023  8:44 PM    Patient Admission Status: Inpatient   Readmission Risk (Low < 19, Mod (19-27), High > 27): Readmission Risk Score: 24.9    Current PCP: Marcio Knott MD  PCP verified by CM? Yes    Chart Reviewed: Yes      History Provided by: Patient, Medical Record  Patient Orientation: Alert and Oriented    Patient Cognition: Alert    Hospitalization in the last 30 days (Readmission):  Yes    If yes, Readmission Assessment in CM Navigator will be completed. Readmission Assessment  Number of Days since last admission?: 8-30 days  Previous Disposition: [address] [place] [address]  Who is being Interviewed: Patient, Caregiver  What was the patient's/caregiver's perception as to why they think they needed to return back to the hospital?: Other (Comment)  Did you visit your Primary Care Physician after you left the hospital, before you returned this time?: Yes  Did you see a specialist, such as Cardiac, Pulmonary, Orthopedic Physician, etc. after you left the hospital?: No  Who advised the patient to return to the hospital?: Physician  Does the patient report anything that got in the way of taking their medications?: No      Advance Directives:      Code Status: Full Code   Patient's Primary Decision Maker is: Named in Scanned ACP Document      Discharge Planning:    Patient lives with: Alone Type of Home: Long-Term Care  Primary Care Giver: Other (Comment) (LTC Staff)  Patient Support Systems include:  Other (Comment) (long term care staff)   Current Financial resources:    Current community resources: Current services prior to admission: None            Current DME:              Type of Home Care services:  None    ADLS  Prior functional level: Assistance with the following:, Bathing, Dressing, Cooking, Housework, Shopping, Mobility  Current functional level: Assistance with the following:, Bathing, Dressing, Cooking, Housework, Shopping, Mobility    PT AM-PAC:   /24  OT AM-PAC:   /24    Family can provide assistance at DC: No  Would you like Case Management to discuss the discharge plan with any other family members/significant others, and if so, who? Yes (Legal Guardians)  Plans to Return to Present Housing: Yes (long term care at [facility])  Other Identified Issues/Barriers to RETURNING to current housing: none  Potential Assistance needed at discharge: N/A            Potential DME:    Patient expects to discharge to: Long-term care  Plan for transportation at discharge:      Financial    Payor: Sami / Plan: Debby Riojas / Product Type: *No Product type* /     Does insurance require precert for SNF: Yes    Potential assistance Purchasing Medications: No  Meds-to-Beds request:        Jimmy Coulter, [address] [facility] [phone] - F [phone]  [address] [place] [address]  Phone: [phone] Fax: [phone]      Notes:    Factors facilitating achievement of predicted outcomes: Caregiver support    Barriers to discharge: Pain, Confusion, Cognitive deficit, Impulsivity, Limited safety awareness, Anxiety, and Medication managment    Additional Case Management Notes: Patient is long term care resident at [facility], patient can return no pre-cert needed for return. Patient will need to be sitter free x24 hours prior to return to facility at [place].      The Plan for Transition of Care is related to the following treatment goals of Hyperglycemia [R73.9]  Hypertensive emergency [K66.0]    IF APPLICABLE: The Patient and/or patient representative Jessie Velasquez and his family were provided with a choice of provider and agrees with the discharge plan. Freedom of choice list with basic dialogue that supports the patient's individualized plan of care/goals and shares the quality data associated with the providers was provided to: Patient Representative   Patient Representative Name: Mark Dawson     The Patient and/or Patient Representative Agree with the Discharge Plan?  Yes    Iram Caceres RN  Case Management Department  Ph: [phone] Fax: [phone]

## 2023-03-03 NOTE — PROGRESS NOTES
Pt becoming increasing agitated and hysterical, not wanting staff to leave his bedside due to fear of death. Charge RN notified. Pt again climbed out of bed without assistance and sat on floor. Sitter placed at bedside.      Electronically signed by Jack Elena RN on 3/2/2023

## 2023-03-03 NOTE — DISCHARGE INSTR - COC
Continuity of Care Form    Patient Name: Kain Ely   :  1980  MRN:  [id number]    Admit date:  3/1/2023  Discharge date: 3/6/2023    Code Status Order: Full Code   Advance Directives:     Admitting Physician:  Armaan Melendez MD  PCP: Constantino Cronin MD    Discharging Nurse: Dayanara Patton 23 Unit/Room#: 7954/0604-23  Discharging Unit Phone Number: [phone]    Emergency Contact:   Extended Emergency Contact Information  Primary Emergency Contact: [address] [facility] Phone: [phone]  Mobile Phone: [phone]  Relation: Legal Guardian  Secondary Emergency Contact: [address] [facility] Phone: [phone]  Relation: Legal Guardian    Past Surgical History:  No past surgical history on file. Immunization History: There is no immunization history on file for this patient.     Active Problems:  Patient Active Problem List   Diagnosis Code    Chest pain R07.9    Elevated troponin R77.8    Hyperglycemia R73.9    Morbid obesity (HCC) E66.01    Electrolyte imbalance E87.8    Cushingoid facies R68.89    Acute on chronic respiratory failure with hypoxemia (HCC) J96.21    Acute on chronic combined systolic and diastolic heart failure (HCC) I50.43    Acute kidney injury superimposed on CKD ([facility] N17.9, N18.9    Acute on chronic congestive heart failure ([facility] I50.9    Essential hypertension I10    Hypertensive emergency I16.1       Isolation/Infection:   Isolation            Droplet Plus          Patient Infection Status       Infection Onset Added Last Indicated Last Indicated By Review Planned Expiration Resolved Resolved By    COVID-19 23 Respiratory Panel, Molecular, with COVID-19 (Restricted: peds pts or suitable admitted adults) 23      Resolved    COVID-19 (Rule Out) 23 Respiratory Panel, Molecular, with COVID-19 (Restricted: peds pts or suitable admitted adults) (Ordered)   23 Rule-Out Test Resulted Nurse Assessment:  Last Vital Signs: BP (!) 161/116   Pulse (!) 108   Temp 98.9 °F (37.2 °C)   Resp 24   Ht 6' 1\" (1.854 m)   Wt (!) 330 lb 14.6 oz (150.1 kg)   SpO2 91%   BMI 43.66 kg/m²     Last documented pain score (0-10 scale): Pain Level: 4  Last Weight:   Wt Readings from Last 1 Encounters:   03/02/23 (!) 330 lb 14.6 oz (150.1 kg)     Mental Status:  oriented and alert    IV Access:  - None    Nursing Mobility/ADLs:  Walking   Assisted  Transfer  Assisted  Bathing  Assisted  Dressing  Assisted  Toileting  Assisted  Feeding  Independent  Med Admin  Assisted  Med Delivery   whole    Wound Care Documentation and Therapy:        Elimination:  Continence: Bowel: Yes  Bladder: Yes  Urinary Catheter: None   Colostomy/Ileostomy/Ileal Conduit: No       Date of Last BM: 3/6/23    Intake/Output Summary (Last 24 hours) at 3/3/2023 1157  Last data filed at 3/3/2023 1147  Gross per 24 hour   Intake 500 ml   Output 6300 ml   Net -5800 ml     I/O last 3 completed shifts: In: 2111.8 [P.O.:620; IV Piggyback:1491.8]  Out: 4650 [Urine:4650]    Safety Concerns:     None    Impairments/Disabilities:      None    Nutrition Therapy:  Current Nutrition Therapy:   - Oral Diet:  General    Routes of Feeding: Oral  Liquids: No Restrictions  Daily Fluid Restriction: no  Last Modified Barium Swallow with Video (Video Swallowing Test): not done    Treatments at the Time of Hospital Discharge:   Respiratory Treatments: 3/6/23  Oxygen Therapy:  is not on home oxygen therapy. Ventilator:    - No ventilator support    Heart Failure Instructions for Daily Management  Patient was treated for chronic diastolic heart failure. he  will require the following:    Please weigh daily on the same scale and approximately the same time of day. Report weight gain of 3 pounds/day or 5 pounds/week to : dwayne ROPER, Jennifer Bertrand [phone], and [facility] / Freddy Ramos [phone].   Please use hospital discharge weight as baseline reference.   Please monitor for signs and symptoms of and report to MD:  Worsening Heart Failure: sudden weight gain, shortness of breath, lower extremity or general edema/swelling, abdominal bloating/swelling, inability to lie flat, intolerance to usual activity, or cough (especially at night). Report these finding even if no increase in weight.  Dehydration:  having difficulty or a decrease in urination, dizziness, worsening fatigue, or new onset/worsening of generalized weakness.     Please continue a LOW SODIUM diet and LIMIT fluid intake to 48 - 64 ounces ( 1.5 - 2 liters) per day.     Call Niurka Cohn [phone], facility MD, and [facility] / Jose Antonio [phone] with any questions or concerns.   Please continue heart failure education to patient and family/support system.  See After Visit Summary for hospital follow up appointment details.  Consider spiritual care referral for support and/or completion of advance directives .  Consider: having the facility MD complete required 7 day follow up, [facility] telehealth program if patient agreeable and able to participate, and palliative care consult for ongoing goals of care, end of life, and/or chronic disease management discussions.  Patient's primary cardiologist is Dr Camacho.   Pt has VIRTUAL VISIT follow up with cardiology on 3/8 @ 2:30pm (in patient visit restricted because of + Covid status).  Please have facility staff assist with this appointment.  Pt will need to have someone be able to report BP log to cardiology to allow for medication adjustment if needed.   Please draw BMP on 3/11 and fax results to [facility] so they can be reviewed with pt at cardiology follow up on 3/13 in person visit.       Rehab Therapies: N/A  Weight Bearing Status/Restrictions: No weight bearing restrictions  Other Medical Equipment (for information only, NOT a DME order): N/A  Other Treatments: n/A    Patient's personal  belongings (please select all that are sent with patient):  Patient has shirt, pants, and shoes    RN SIGNATURE:  Electronically signed by Hector Blackman RN on 3/6/23 at 6:39 PM EST    CASE MANAGEMENT/SOCIAL WORK SECTION    Inpatient Status Date: ***    Readmission Risk Assessment Score:  Readmission Risk              Risk of Unplanned Readmission:  33           Discharging to Facility/ Agency   Name:   Address:  Phone:  Fax:    Dialysis Facility (if applicable)   Name:  Address:  Dialysis Schedule:  Phone:  Fax:    / signature: {Esignature:[id number]}    PHYSICIAN SECTION    Prognosis: Good    Condition at Discharge: Stable    Rehab Potential (if transferring to Rehab): Good    Recommended Labs or Other Treatments After Discharge:     CBC and Renal function panel in 5-7 days    Monitor Bps to guide further medication management    Follow up with Endocrinologist, Cardiology      Physician Certification: I certify the above information and transfer of Elie Johnston  is necessary for the continuing treatment of the diagnosis listed and that he requires LTAC for greater 30 days.      Update Admission H&P: No change in H&P    PHYSICIAN SIGNATURE:  Electronically signed by  Maciej Carter MD and Brian Fish MD on 3/6/23 at 3:26 PM EST

## 2023-03-03 NOTE — CONSULTS
[facility]  Palliative Medicine Consultation Note      Date Of Admission:3/1/2023  Date of consult: 03/03/23  Seen by [facility] in the past:  No    Recommendations:        Of note, pt has a guardian through [facility]. Pt has four named guardians, Paul Portillo, Halina Balbuena and Heber Babin. Per the order each are allowed to act independently of each other. Saw pt at the bedside. He denies complaints other than constipation, has not had a BM since admission. Called pt's guardian Paul Portillo for updates, no answer, left VM with callback number. 1. Goals of Care/Advanced Care planning/Code status: Full code, will discuss with legal guardian today when able to reach them. 2. Pain: pt denies  3. SOB: pt denies  4. Constipation: Scheduled miralax daily. 5. Hypertensive emergency with known bilateral adrenal hyperplasia: work up ongoing, pt was referred to endocrinology outpatient. 6. Disposition: likely return to [facility]. Reason for Consult:         []  Goals of Care  []  Code Status Discussion/Advanced Care Planning   []  Psychosocial/Family Support  []  Symptom Management  [x]  Other (Specify) HF readmission    Requesting Physician: Dr. Kermit Fisher:  hyperglycemia    History Obtained From:  patient, electronic medical record    History of Present Illness:         Avelino Richard is a 38 y.o. male with PMH of COPD, hypertension, schizoaffective disorder, CHF, diabetes  who presented with dizziness, shortness of breath and hyperglycemia from [facility]. Of note, pt was recently admitted to [facility] 2/12-2/18 with acute on chronic HF. Pt was hypertensive at the time which was felt to be due to bilateral adrenal hyperplasia and was advised to follow-up with endocrinology in outpatient. Pt was again admitted with hypertensive urgency.      Subjective:         Past Medical History:        Diagnosis Date    COPD (chronic obstructive pulmonary disease) ([place] [address].)     Essential hypertension     Hypertensive heart disease with CHF ([place] [address].)     MDD (major depressive disorder)     Schizoaffective disorder ([place] [address].)     Systolic CHF ([place] [address].)     Transsexualism     Type 2 diabetes mellitus ([place] [address].)        Past Surgical History:    No past surgical history on file.     Current Medications:    Medications Prior to Admission: furosemide (LASIX) 40 MG tablet, Take 40 mg by mouth daily As needed for fluid retention  insulin aspart (NOVOLOG) 100 UNIT/ML injection pen, Inject 15 Units into the skin 3 times daily (before meals)  furosemide (LASIX) 40 MG tablet, Take 1 tablet by mouth 2 times daily  hydrALAZINE (APRESOLINE) 100 MG tablet, Take 1 tablet by mouth 3 times daily  carvedilol (COREG) 25 MG tablet, Take 2 tablets by mouth 2 times daily (with meals)  insulin glargine (LANTUS;BASAGLAR) 100 UNIT/ML injection pen, Inject 80 Units into the skin in the morning and at bedtime  spironolactone (ALDACTONE) 50 MG tablet, Take 50 mg by mouth daily  hydrOXYzine HCl (ATARAX) 25 MG tablet, Take 25 mg by mouth in the morning, at noon, and at bedtime  atorvastatin (LIPITOR) 80 MG tablet, Take 1 tablet by mouth nightly  aspirin 81 MG chewable tablet, Take 81 mg by mouth daily  benztropine (COGENTIN) 1 MG tablet, Take 1 mg by mouth 2 times daily  vitamin B-12 (CYANOCOBALAMIN) 1000 MCG tablet, Take 1,000 mcg by mouth daily  divalproex (DEPAKOTE SPRINKLE) 125 MG capsule, Take 500 mg by mouth 3 times daily  fenofibrate micronized (LOFIBRA) 200 MG capsule, Take 200 mg by mouth every morning (before breakfast)  haloperidol (HALDOL) 20 MG tablet, Take 20 mg by mouth 3 times daily  metFORMIN (GLUCOPHAGE) 1000 MG tablet, Take 1,000 mg by mouth 2 times daily (with meals)  insulin aspart (NOVOLOG FLEXPEN) 100 UNIT/ML injection pen, Inject into the skin 3 times daily (before meals) Inject per sliding scale: if 200-249 inject 4 units, if 250-299 inject 8 units, if 300-349 inject 10 units, if 350-399 inject 12 units  omeprazole (PRILOSEC) 20 MG delayed release capsule, Take 20 mg by mouth daily  senna (SENOKOT) 8.6 MG tablet, Take 1 tablet by mouth 2 times daily  QUEtiapine (SEROQUEL) 200 MG tablet, Take 200 mg by mouth in the morning and 200 mg at noon and 200 mg in the evening and 200 mg before bedtime. dulaglutide (TRULICITY) 1.5 QI/9.9BD SC injection, Inject 1.5 mg into the skin once a week On Saturdays  acetaminophen (TYLENOL) 325 MG tablet, Take 975 mg by mouth every 6 hours as needed for Pain  [DISCONTINUED] ferrous gluconate (FERGON) 324 (38 Fe) MG tablet, Take 324 mg by mouth daily (with breakfast) (Patient not taking: Reported on 3/2/2023)    Allergies:  Patient has no known allergies. Social History:    TOBACCO: reports that he has been smoking cigarettes. He has never used smokeless tobacco.  ETOH:   reports no history of alcohol use. Patient currently lives in a nursing home    Review of Systems -   Review of Systems: A 10 point review of systems was conducted, significant findings as notedin HPI. Objective:          Physical Exam  Constitutional:       General: He is not in acute distress. Appearance: He is not ill-appearing. Cardiovascular:      Rate and Rhythm: Normal rate and regular rhythm. Heart sounds: Normal heart sounds. Pulmonary:      Breath sounds: Normal breath sounds. Abdominal:      General: Bowel sounds are normal.      Palpations: Abdomen is soft. Musculoskeletal:      Right lower leg: No edema. Left lower leg: No edema. Skin:     General: Skin is warm and dry. Neurological:      Mental Status: He is alert and oriented to person, place, and time. Psychiatric:      Comments: Pt appears guarded, anxious        Palliative Performance Scale:  [x] 60% Ambulation reduced; Significant disease; Can't do hobbies/housework; intake normal or reduced; occasional assist; LOC full/confusion  [] 50% Mainly sit/lie;  Extensive disease; Can't do any work; Considerable assist; intake normal  Or reduced; LOC full/confusion  [] 40% Mainly in bed; Extensive disease; Mainly assist; intake normal or reduced; occasional assist; LOC full/confusion  [] 30% Bed Bound; Extensive disease; Total care; intake reduced; LOC full/confusion  [] 20% Bed Bound; Extensive disease; Total care; intake minimal; Drowsy/coma  [] 10% Bed Bound; Extensive disease; Total care; Mouth care only; Drowsy/coma  [] 0% Death    PPS: 60    Vitals:    BP (!) 164/127   Pulse (!) 108   Temp 98.9 °F (37.2 °C)   Resp 24   Ht 6' 1\" (1.854 m)   Wt (!) 330 lb 14.6 oz (150.1 kg)   SpO2 91%   BMI 43.66 kg/m²     Labs:    BMP:   Recent Labs     03/01/23 2119 03/02/23  1633   * 132*   K 4.0 4.1   CL 98* 95*   CO2 26 27   BUN 17 18   CREATININE 1.1 1.0   GLUCOSE 405* 433*     CBC:   Recent Labs     03/01/23 2119   WBC 5.5   HGB 12.7*   HCT 40.6          LFT's: No results for input(s): AST, ALT, ALB, BILITOT, ALKPHOS in the last 72 hours. Troponin:   Recent Labs     03/01/23 2119 03/01/23  2358   TROPONINI 0.10* 0.09*     BNP: No results for input(s): BNP in the last 72 hours. ABGs: No results for input(s): PHART, TET6BAH, PO2ART in the last 72 hours. INR: No results for input(s): INR in the last 72 hours. U/A:  Recent Labs     03/01/23  2224   COLORU Yellow   PHUR 6.0   WBCUA None seen   RBCUA None seen   MUCUS 1+*   BACTERIA Rare*   CLARITYU Clear   SPECGRAV 1.015   LEUKOCYTESUR Negative   UROBILINOGEN 1.0   BILIRUBINUR Negative   BLOODU Negative   GLUCOSEU >=1000*       CT CHEST PULMONARY EMBOLISM W CONTRAST   Final Result      1. No evidence of any pulmonary thromboembolus, aneurysm or dissection. 2. Patchy multifocal airspace disease predominantly in the right lower lobe and left lower lobe with some patchy groundglass minimal upper lobe changes in both lungs, most likely suggestive of atypical pneumonia or viral pneumonia or pneumonitis.     Correlate clinically      XR CHEST (2 VW)   Final Result      Interval clearing of airspace disease throughout both lungs since prior exam from February 2023. No acute chest process. Conclusion/Time spent:         Recommendations see above    Time spent with patient and/or family: 10  Time reviewing records: 10 min   Time communicating with staff: 5 min     A total of 25 minutes spent with the patient and family on unit greater than 50% in counseling regarding palliative care and in goals of care for the patient. Thank you to Dr. Jackie Reynolds for this consultation. We will continue to follow Mr. Saima Gilltanya johnson as needed.     [address] [facility]  Inpatient Palliative Care  [phone]

## 2023-03-03 NOTE — PROGRESS NOTES
Pt states he smokes 4 cigarettes/day, requesting a nicotine patch. MD notified. Patch ordered and place on patient. Pt also requesting a pregnancy test because he states he feels his stomach getting bigger and he is urinating a lot. MD aware, no new orders at this time.      Electronically signed by Barrington Leventhal, RN on 3/2/2023

## 2023-03-03 NOTE — PROGRESS NOTES
Saavedra removed per protocol, patient tolerated procedure well. Will continue to monitor for acute urinary retention and strictly measure urine output.

## 2023-03-03 NOTE — PROGRESS NOTES
Per MD Pt Seroquel on hold due to history of elevated QTc.   Electronically signed by Mat Amaro RN on 3/2/2023

## 2023-03-03 NOTE — PROGRESS NOTES
Internal Medicine Progress Note    Date: 3/3/2023   Patient: [address] Day: 1      CC: Hyperglycemia       Interval Hx   Patient seen at bedside this morning. Overnight, he became increasingly agitated. He was reportedly refusing medications noting we were trying to poison him, requesting for pregnancy test He also reportedly fell out of bed when trying to get up and has a sitter at the bedside now. On evaluation this morning, he appears sleepy. Sitter at bedside reports he has been calm and mostly sleeping. He denies any chest pain, SOB, cough. BP improved to 165/127. , afebrile, 91% on RA. Patient tested positive for COVID. Psychiatry has been consulted for further evaluation. Objective     Vital Signs:  Patient Vitals for the past 8 hrs:   BP Temp Temp src Pulse Resp SpO2   03/03/23 0629 (!) 176/114 -- -- -- -- --   03/03/23 0508 (!) 203/155 -- -- (!) 102 -- --   03/03/23 0242 (!) 168/118 98.6 °F (37 °C) Oral (!) 104 24 93 %   03/03/23 0145 (!) 166/114 -- -- (!) 109 -- --   03/02/23 2330 (!) 164/109 -- -- 98 -- --       Physical Exam  Constitutional:       General: He is not in acute distress. Appearance: He is obese. He is ill-appearing. Pulmonary:      Effort: Pulmonary effort is normal. No respiratory distress. Breath sounds: Normal breath sounds. No wheezing. Abdominal:      General: Bowel sounds are normal.      Palpations: Abdomen is soft. Tenderness: There is no abdominal tenderness. Musculoskeletal:      Right lower leg: Edema present. Left lower leg: Edema present. Skin:     General: Skin is warm and dry. Capillary Refill: Capillary refill takes less than 2 seconds. Neurological:      Mental Status: He is alert and oriented to person, place, and time.           Labs:  CBC:   Recent Labs     03/01/23 2119   WBC 5.5   HGB 12.7*   HCT 40.6          BMP:   Recent Labs     03/01/23 2119 03/02/23  1633   * 132*   K 4.0 4.1 CL 98* 95*   CO2 26 27   BUN 17 18   CREATININE 1.1 1.0   GLUCOSE 405* 433*   PHOS  --  3.0     Magnesium: No results for input(s): MG in the last 72 hours. LFT's: No results for input(s): AST, ALT, ALB, BILITOT, ALKPHOS in the last 72 hours. U/A:   Recent Labs     03/01/23  2224   COLORU Yellow   PHUR 6.0   WBCUA None seen   RBCUA None seen   MUCUS 1+*   BACTERIA Rare*   CLARITYU Clear   SPECGRAV 1.015   LEUKOCYTESUR Negative   UROBILINOGEN 1.0   BILIRUBINUR Negative   BLOODU Negative   GLUCOSEU >=1000*       Radiology:  CT CHEST PULMONARY EMBOLISM W CONTRAST   Final Result      1. No evidence of any pulmonary thromboembolus, aneurysm or dissection. 2. Patchy multifocal airspace disease predominantly in the right lower lobe and left lower lobe with some patchy groundglass minimal upper lobe changes in both lungs, most likely suggestive of atypical pneumonia or viral pneumonia or pneumonitis. Correlate clinically      XR CHEST (2 VW)   Final Result      Interval clearing of airspace disease throughout both lungs since prior exam from February 2023. No acute chest process. Assessment & Plan     COVID 19 Pneumonia  -Patient presented with complaints of cough productive of brownish sputum, shortness of breath, new oxygen requirement on 2 L O2 NC, Pro arpit neg , ESR 3, CRP 8.5, MRSA DNA probe neg, COVID 19 +ve  -CT PE with no evidence of PE, aneurysm or dissection.  Patchy multifocal airspace disease predominantly in the right lower lobe and left lower lobe with some patchy groundglass minimal upper lobe changes in both lungs, most likely suggestive of atypical pneumonia or viral pneumonia or pneumonitis  -Culture, respiratory, urine Legionella antigen, respiratory virus mini panel pending    -Antibiotics discontinued  -Continue symptomatic management     Hypertensive Urgency  Likely 2/2 to bilateral adrenal nodular hyperplasia   -BP on admission was 202/135, CT Adrenals: 11/19/2022 with diffuse nodular adrenal hyperplasia. -CXR: no evidence of pulm edema.   -Serum cortisol 30.1, ACTH and aldosterone level, renin pending   -Last  echo HFpEF 50%, 11/2022  -Gradually reduce SBP to goal < 130  -Continue carvedilol 50 mg BID, hydralazine 100 mg TID, spironolactone 50 mg, furosemide 40 mg twice a day. -Monitor BP     Diabetes Mellitus type II   Poorly controlled   -Blood glucose  on admission was 405. -A1c on 11/15/2022 was 12.6, repeat A1c  -Lantus increased from 80 to 90 BID   -17 units lispro TID  -HDSSI, hypoglycemic protocol, POC glucose checks   -Monitor blood glucose     Elevated troponin  Likely demand secondary to hypertensive urgency  -Troponin elevated to 0.1 > 0.09  -EKG with no acute ischemic changes, QTC prolonged to 474.   -On continuous telemetry     HFrEF  -19 lb weight gain from prior admission in 2/14, weighed 311 lb, currently weighing 330 lb. -Echo 11/22: EF 50%, indeterminate diastolic dysfunction   -Continue Lasix, hydralazine and carvedilol  -Strict Is and Os  -Daily standing weights  -Low sodium diet     Chronic medical conditions   Schizoaffective disorder:   -On quetiapine, haloperidol, depakote, benztropine   -Seroquel held due to elevated QTc. Mixed Hyperlipidemia:   -Continue fenofibrate, atorvastatin     DVT PPx: Lovenox   Diet: ADULT DIET; Regular; 3 carb choices (45 gm/meal);  Low Sodium (2 gm)   Code status:  Full Code   Disposition: IP      Will discuss with attending physician Kuldip Plunkett MD     _____________________  Daryle Aris, MD   Internal Medicine Resident, PGY-1

## 2023-03-04 LAB
ALDOSTERONE: 4.5 NG/DL
ANION GAP SERPL CALCULATED.3IONS-SCNC: 9 MMOL/L (ref 3–16)
BUN BLDV-MCNC: 20 MG/DL (ref 7–20)
CALCIUM SERPL-MCNC: 8.8 MG/DL (ref 8.3–10.6)
CHLORIDE BLD-SCNC: 92 MMOL/L (ref 99–110)
CO2: 29 MMOL/L (ref 21–32)
CREAT SERPL-MCNC: 1.3 MG/DL (ref 0.9–1.3)
EKG ATRIAL RATE: 100 BPM
EKG DIAGNOSIS: NORMAL
EKG P AXIS: 75 DEGREES
EKG P-R INTERVAL: 144 MS
EKG Q-T INTERVAL: 372 MS
EKG QRS DURATION: 84 MS
EKG QTC CALCULATION (BAZETT): 479 MS
EKG R AXIS: 237 DEGREES
EKG T AXIS: 71 DEGREES
EKG VENTRICULAR RATE: 100 BPM
ESTIMATED AVERAGE GLUCOSE: 286.2 MG/DL
GFR SERPL CREATININE-BSD FRML MDRD: >60 ML/MIN/{1.73_M2}
GLUCOSE BLD-MCNC: 280 MG/DL (ref 70–99)
GLUCOSE BLD-MCNC: 284 MG/DL (ref 70–99)
GLUCOSE BLD-MCNC: 290 MG/DL (ref 70–99)
GLUCOSE BLD-MCNC: 300 MG/DL (ref 70–99)
GLUCOSE BLD-MCNC: 310 MG/DL (ref 70–99)
GLUCOSE BLD-MCNC: 331 MG/DL (ref 70–99)
GLUCOSE BLD-MCNC: 366 MG/DL (ref 70–99)
GLUCOSE BLD-MCNC: 376 MG/DL (ref 70–99)
GLUCOSE BLD-MCNC: 404 MG/DL (ref 70–99)
GLUCOSE BLD-MCNC: 508 MG/DL (ref 70–99)
HBA1C MFR BLD: 11.6 %
HCT VFR BLD CALC: 38.8 % (ref 40.5–52.5)
HEMOGLOBIN: 12.3 G/DL (ref 13.5–17.5)
MAGNESIUM: 1.9 MG/DL (ref 1.8–2.4)
MCH RBC QN AUTO: 26.8 PG (ref 26–34)
MCHC RBC AUTO-ENTMCNC: 31.7 G/DL (ref 31–36)
MCV RBC AUTO: 84.4 FL (ref 80–100)
PDW BLD-RTO: 15.2 % (ref 12.4–15.4)
PERFORMED ON: ABNORMAL
PLATELET # BLD: 177 K/UL (ref 135–450)
PMV BLD AUTO: 9.9 FL (ref 5–10.5)
POTASSIUM SERPL-SCNC: 4 MMOL/L (ref 3.5–5.1)
RBC # BLD: 4.59 M/UL (ref 4.2–5.9)
SODIUM BLD-SCNC: 130 MMOL/L (ref 136–145)
WBC # BLD: 5.1 K/UL (ref 4–11)

## 2023-03-04 PROCEDURE — 6370000000 HC RX 637 (ALT 250 FOR IP): Performed by: STUDENT IN AN ORGANIZED HEALTH CARE EDUCATION/TRAINING PROGRAM

## 2023-03-04 PROCEDURE — 36415 COLL VENOUS BLD VENIPUNCTURE: CPT

## 2023-03-04 PROCEDURE — 6370000000 HC RX 637 (ALT 250 FOR IP)

## 2023-03-04 PROCEDURE — 83735 ASSAY OF MAGNESIUM: CPT

## 2023-03-04 PROCEDURE — 6360000002 HC RX W HCPCS: Performed by: STUDENT IN AN ORGANIZED HEALTH CARE EDUCATION/TRAINING PROGRAM

## 2023-03-04 PROCEDURE — 82530 CORTISOL FREE: CPT

## 2023-03-04 PROCEDURE — 2060000000 HC ICU INTERMEDIATE R&B

## 2023-03-04 PROCEDURE — 99233 SBSQ HOSP IP/OBS HIGH 50: CPT | Performed by: HOSPITALIST

## 2023-03-04 PROCEDURE — 6370000000 HC RX 637 (ALT 250 FOR IP): Performed by: INTERNAL MEDICINE

## 2023-03-04 PROCEDURE — 94664 DEMO&/EVAL PT USE INHALER: CPT

## 2023-03-04 PROCEDURE — 94640 AIRWAY INHALATION TREATMENT: CPT

## 2023-03-04 PROCEDURE — 2500000003 HC RX 250 WO HCPCS: Performed by: INTERNAL MEDICINE

## 2023-03-04 PROCEDURE — 93010 ELECTROCARDIOGRAM REPORT: CPT | Performed by: INTERNAL MEDICINE

## 2023-03-04 PROCEDURE — 93005 ELECTROCARDIOGRAM TRACING: CPT | Performed by: NURSE PRACTITIONER

## 2023-03-04 PROCEDURE — 2580000003 HC RX 258: Performed by: STUDENT IN AN ORGANIZED HEALTH CARE EDUCATION/TRAINING PROGRAM

## 2023-03-04 PROCEDURE — 80048 BASIC METABOLIC PNL TOTAL CA: CPT

## 2023-03-04 PROCEDURE — 82088 ASSAY OF ALDOSTERONE: CPT

## 2023-03-04 PROCEDURE — 85027 COMPLETE CBC AUTOMATED: CPT

## 2023-03-04 PROCEDURE — 84244 ASSAY OF RENIN: CPT

## 2023-03-04 RX ORDER — SPIRONOLACTONE 50 MG/1
100 TABLET, FILM COATED ORAL DAILY
Status: DISCONTINUED | OUTPATIENT
Start: 2023-03-05 | End: 2023-03-05

## 2023-03-04 RX ORDER — INSULIN LISPRO 100 [IU]/ML
27 INJECTION, SOLUTION INTRAVENOUS; SUBCUTANEOUS
Status: DISCONTINUED | OUTPATIENT
Start: 2023-03-04 | End: 2023-03-06 | Stop reason: HOSPADM

## 2023-03-04 RX ORDER — FUROSEMIDE 10 MG/ML
40 INJECTION INTRAMUSCULAR; INTRAVENOUS ONCE
Status: DISCONTINUED | OUTPATIENT
Start: 2023-03-04 | End: 2023-03-04

## 2023-03-04 RX ORDER — NIFEDIPINE 30 MG/1
90 TABLET, FILM COATED, EXTENDED RELEASE ORAL DAILY
Status: DISCONTINUED | OUTPATIENT
Start: 2023-03-05 | End: 2023-03-06 | Stop reason: HOSPADM

## 2023-03-04 RX ADMIN — IPRATROPIUM BROMIDE AND ALBUTEROL 1 PUFF: 20; 100 SPRAY, METERED RESPIRATORY (INHALATION) at 07:54

## 2023-03-04 RX ADMIN — FENOFIBRATE 160 MG: 160 TABLET, FILM COATED ORAL at 09:03

## 2023-03-04 RX ADMIN — HYDROXYZINE HYDROCHLORIDE 25 MG: 25 TABLET ORAL at 09:03

## 2023-03-04 RX ADMIN — QUETIAPINE FUMARATE 200 MG: 200 TABLET ORAL at 05:20

## 2023-03-04 RX ADMIN — INSULIN GLARGINE 50 UNITS: 100 INJECTION, SOLUTION SUBCUTANEOUS at 21:47

## 2023-03-04 RX ADMIN — QUETIAPINE FUMARATE 200 MG: 200 TABLET ORAL at 12:23

## 2023-03-04 RX ADMIN — QUETIAPINE FUMARATE 200 MG: 200 TABLET ORAL at 21:51

## 2023-03-04 RX ADMIN — HALOPERIDOL 20 MG: 5 TABLET ORAL at 20:28

## 2023-03-04 RX ADMIN — HALOPERIDOL 20 MG: 5 TABLET ORAL at 09:03

## 2023-03-04 RX ADMIN — LABETALOL HYDROCHLORIDE 10 MG: 5 INJECTION, SOLUTION INTRAVENOUS at 04:23

## 2023-03-04 RX ADMIN — SODIUM CHLORIDE, PRESERVATIVE FREE 10 ML: 5 INJECTION INTRAVENOUS at 23:00

## 2023-03-04 RX ADMIN — INSULIN GLARGINE 45 UNITS: 100 INJECTION, SOLUTION SUBCUTANEOUS at 07:36

## 2023-03-04 RX ADMIN — INSULIN LISPRO 16 UNITS: 100 INJECTION, SOLUTION INTRAVENOUS; SUBCUTANEOUS at 13:26

## 2023-03-04 RX ADMIN — INSULIN LISPRO 27 UNITS: 100 INJECTION, SOLUTION INTRAVENOUS; SUBCUTANEOUS at 12:24

## 2023-03-04 RX ADMIN — SENNOSIDES 8.6 MG: 8.6 TABLET, COATED ORAL at 09:02

## 2023-03-04 RX ADMIN — DIVALPROEX SODIUM 500 MG: 125 CAPSULE, COATED PELLETS ORAL at 13:30

## 2023-03-04 RX ADMIN — HYDRALAZINE HYDROCHLORIDE 100 MG: 100 TABLET, FILM COATED ORAL at 13:30

## 2023-03-04 RX ADMIN — CARVEDILOL 50 MG: 25 TABLET, FILM COATED ORAL at 09:02

## 2023-03-04 RX ADMIN — IPRATROPIUM BROMIDE AND ALBUTEROL 1 PUFF: 20; 100 SPRAY, METERED RESPIRATORY (INHALATION) at 21:04

## 2023-03-04 RX ADMIN — ASPIRIN 81 MG 81 MG: 81 TABLET ORAL at 09:03

## 2023-03-04 RX ADMIN — ENOXAPARIN SODIUM 40 MG: 100 INJECTION SUBCUTANEOUS at 14:29

## 2023-03-04 RX ADMIN — HALOPERIDOL 20 MG: 5 TABLET ORAL at 13:31

## 2023-03-04 RX ADMIN — PANTOPRAZOLE SODIUM 40 MG: 40 TABLET, DELAYED RELEASE ORAL at 05:20

## 2023-03-04 RX ADMIN — HYDRALAZINE HYDROCHLORIDE 100 MG: 100 TABLET, FILM COATED ORAL at 09:03

## 2023-03-04 RX ADMIN — IPRATROPIUM BROMIDE AND ALBUTEROL 1 PUFF: 20; 100 SPRAY, METERED RESPIRATORY (INHALATION) at 13:49

## 2023-03-04 RX ADMIN — INSULIN LISPRO 27 UNITS: 100 INJECTION, SOLUTION INTRAVENOUS; SUBCUTANEOUS at 16:23

## 2023-03-04 RX ADMIN — DIVALPROEX SODIUM 500 MG: 125 CAPSULE, COATED PELLETS ORAL at 09:04

## 2023-03-04 RX ADMIN — SPIRONOLACTONE 50 MG: 50 TABLET ORAL at 09:03

## 2023-03-04 RX ADMIN — ATORVASTATIN CALCIUM 80 MG: 80 TABLET, FILM COATED ORAL at 21:51

## 2023-03-04 RX ADMIN — HYDRALAZINE HYDROCHLORIDE 100 MG: 100 TABLET, FILM COATED ORAL at 20:27

## 2023-03-04 RX ADMIN — PANTOPRAZOLE SODIUM 40 MG: 40 TABLET, DELAYED RELEASE ORAL at 16:41

## 2023-03-04 RX ADMIN — IPRATROPIUM BROMIDE AND ALBUTEROL 1 PUFF: 20; 100 SPRAY, METERED RESPIRATORY (INHALATION) at 17:33

## 2023-03-04 RX ADMIN — INSULIN LISPRO 12 UNITS: 100 INJECTION, SOLUTION INTRAVENOUS; SUBCUTANEOUS at 17:19

## 2023-03-04 RX ADMIN — INSULIN LISPRO 16 UNITS: 100 INJECTION, SOLUTION INTRAVENOUS; SUBCUTANEOUS at 09:05

## 2023-03-04 RX ADMIN — HYDROXYZINE HYDROCHLORIDE 25 MG: 25 TABLET ORAL at 13:30

## 2023-03-04 RX ADMIN — HYDROXYZINE HYDROCHLORIDE 25 MG: 25 TABLET ORAL at 20:27

## 2023-03-04 RX ADMIN — INSULIN LISPRO 25 UNITS: 100 INJECTION, SOLUTION INTRAVENOUS; SUBCUTANEOUS at 07:32

## 2023-03-04 RX ADMIN — QUETIAPINE FUMARATE 200 MG: 200 TABLET ORAL at 16:41

## 2023-03-04 RX ADMIN — SODIUM CHLORIDE, PRESERVATIVE FREE 10 ML: 5 INJECTION INTRAVENOUS at 09:05

## 2023-03-04 RX ADMIN — SENNOSIDES 8.6 MG: 8.6 TABLET, COATED ORAL at 20:27

## 2023-03-04 RX ADMIN — DIVALPROEX SODIUM 500 MG: 125 CAPSULE, COATED PELLETS ORAL at 20:28

## 2023-03-04 RX ADMIN — INSULIN GLARGINE 50 UNITS: 100 INJECTION, SOLUTION SUBCUTANEOUS at 21:40

## 2023-03-04 RX ADMIN — ENOXAPARIN SODIUM 40 MG: 100 INJECTION SUBCUTANEOUS at 02:08

## 2023-03-04 RX ADMIN — BENZTROPINE MESYLATE 1 MG: 1 TABLET ORAL at 20:27

## 2023-03-04 RX ADMIN — NIFEDIPINE 60 MG: 30 TABLET, EXTENDED RELEASE ORAL at 09:02

## 2023-03-04 RX ADMIN — BENZTROPINE MESYLATE 1 MG: 1 TABLET ORAL at 09:04

## 2023-03-04 RX ADMIN — CARVEDILOL 50 MG: 25 TABLET, FILM COATED ORAL at 16:41

## 2023-03-04 ASSESSMENT — PAIN SCALES - GENERAL
PAINLEVEL_OUTOF10: 0
PAINLEVEL_OUTOF10: 0

## 2023-03-04 NOTE — PLAN OF CARE
Problem: Discharge Planning  Goal: Discharge to home or other facility with appropriate resources  Outcome: Progressing  Flowsheets (Taken 3/2/2023 2031 by Giovanni Cho, RN)  Discharge to home or other facility with appropriate resources:   Identify barriers to discharge with patient and caregiver   Identify discharge learning needs (meds, wound care, etc)     Problem: ABCDS Injury Assessment  Goal: Absence of physical injury  Outcome: Progressing  Flowsheets (Taken 3/2/2023 1038 by Carolina Barnes RN)  Absence of Physical Injury: Implement safety measures based on patient assessment     Problem: Safety - Adult  Goal: Free from fall injury  3/4/2023 1146 by Hernandez Woo RN  Outcome: Progressing  Flowsheets (Taken 3/4/2023 0048 by Robert Machado, RN)  Free From Fall Injury: Instruct family/caregiver on patient safety  3/4/2023 0048 by Robert Machado RN  Outcome: Progressing  Flowsheets (Taken 3/4/2023 0048)  Free From Fall Injury: Instruct family/caregiver on patient safety  Note: Fall precautions are in place. Bed alarm is on and in lowest position. Pt is using call light appropriately. Will continue to monitor.        Problem: Pain  Goal: Verbalizes/displays adequate comfort level or baseline comfort level  Outcome: Progressing  Flowsheets (Taken 3/4/2023 1146)  Verbalizes/displays adequate comfort level or baseline comfort level:   Encourage patient to monitor pain and request assistance   Assess pain using appropriate pain scale     Problem: Cardiovascular - Adult  Goal: Maintains optimal cardiac output and hemodynamic stability  Outcome: Progressing  Flowsheets (Taken 3/2/2023 2031 by Giovanni Cho, MASOUD)  Maintains optimal cardiac output and hemodynamic stability:   Monitor blood pressure and heart rate   Monitor urine output and notify Licensed Independent Practitioner for values outside of normal range   Assess for signs of decreased cardiac output  Goal: Absence of cardiac dysrhythmias or at baseline  Outcome: Progressing  Flowsheets (Taken 3/4/2023 1146)  Absence of cardiac dysrhythmias or at baseline: Monitor cardiac rate and rhythm     Problem: Chronic Conditions and Co-morbidities  Goal: Patient's chronic conditions and co-morbidity symptoms are monitored and maintained or improved  Outcome: Progressing  Flowsheets (Taken 3/4/2023 1146)  Care Plan - Patient's Chronic Conditions and Co-Morbidity Symptoms are Monitored and Maintained or Improved: Monitor and assess patient's chronic conditions and comorbid symptoms for stability, deterioration, or improvement     Problem: Respiratory - Adult  Goal: Achieves optimal ventilation and oxygenation  3/4/2023 1146 by Maya Milton RN  Outcome: Progressing  Flowsheets (Taken 3/4/2023 0049 by Ramos Villela RN)  Achieves optimal ventilation and oxygenation:   Assess for changes in respiratory status   Assess for changes in mentation and behavior   Position to facilitate oxygenation and minimize respiratory effort   Oxygen supplementation based on oxygen saturation or arterial blood gases  3/4/2023 0049 by Ramos Villela RN  Outcome: Progressing  Flowsheets (Taken 3/4/2023 0049)  Achieves optimal ventilation and oxygenation:   Assess for changes in respiratory status   Assess for changes in mentation and behavior   Position to facilitate oxygenation and minimize respiratory effort   Oxygen supplementation based on oxygen saturation or arterial blood gases     Problem: Metabolic/Fluid and Electrolytes - Adult  Goal: Electrolytes maintained within normal limits  Outcome: Progressing  Flowsheets (Taken 3/4/2023 1146)  Electrolytes maintained within normal limits: Monitor labs and assess patient for signs and symptoms of electrolyte imbalances  Goal: Hemodynamic stability and optimal renal function maintained  Outcome: Progressing  Flowsheets (Taken 3/4/2023 1146)  Hemodynamic stability and optimal renal function maintained:   Monitor labs and assess for signs and symptoms of volume excess or deficit   Monitor intake, output and patient weight  Goal: Glucose maintained within prescribed range  3/4/2023 1146 by Lisa Gomez RN  Outcome: Progressing  Flowsheets (Taken 3/4/2023 0049 by Elsa Eisenberg, RN)  Glucose maintained within prescribed range:   Monitor blood glucose as ordered   Assess for signs and symptoms of hyperglycemia and hypoglycemia   Administer ordered medications to maintain glucose within target range   Assess barriers to adequate nutritional intake and initiate nutrition consult as needed  3/4/2023 0049 by Elsa Eisenberg, RN  Outcome: Progressing  Flowsheets (Taken 3/4/2023 0049)  Glucose maintained within prescribed range:   Monitor blood glucose as ordered   Assess for signs and symptoms of hyperglycemia and hypoglycemia   Administer ordered medications to maintain glucose within target range   Assess barriers to adequate nutritional intake and initiate nutrition consult as needed

## 2023-03-04 NOTE — RT PROTOCOL NOTE
RT Nebulizer Bronchodilator Protocol Note    There is a bronchodilator order in the chart from a provider indicating to follow the RT Bronchodilator Protocol and there is an Initiate RT Bronchodilator Protocol order as well (see protocol at bottom of note). CXR Findings:  No results found. The findings from the last RT Protocol Assessment were as follows:  Smoking: Chronic pulmonary disease  Respiratory Pattern: Dyspnea on exertion or RR 21-25 bpm  Breath Sounds: Slightly diminished and/or crackles  Cough: Strong, spontaneous, non-productive  Indication for Bronchodilator Therapy:    Bronchodilator Assessment Score: 6    Aerosolized bronchodilator medication orders have been revised according to the RT Nebulizer Bronchodilator Protocol below. Respiratory Therapist to perform RT Therapy Protocol Assessment initially then follow the protocol. Repeat RT Therapy Protocol Assessment PRN for score 0-3 or on second treatment, BID, and PRN for scores above 3. No Indications - adjust the frequency to every 6 hours PRN wheezing or bronchospasm, if no treatments needed after 48 hours then discontinue using Per Protocol order mode. If indication present, adjust the RT bronchodilator orders based on the Bronchodilator Assessment Score as indicated below. If a patient is on this medication at home then do not decrease Frequency below that used at home. 0-3 - enter or revise RT bronchodilator order(s) to equivalent RT Bronchodilator order with Frequency of every 4 hours PRN for wheezing or increased work of breathing using Per Protocol order mode. 4-6 - enter or revise RT Bronchodilator order(s) to two equivalent RT bronchodilator orders with one order with BID Frequency and one order with Frequency of every 4 hours PRN wheezing or increased work of breathing using Per Protocol order mode.          7-10 - enter or revise RT Bronchodilator order(s) to two equivalent RT bronchodilator orders with one order with TID Frequency and one order with Frequency of every 4 hours PRN wheezing or increased work of breathing using Per Protocol order mode. 11-13 - enter or revise RT Bronchodilator order(s) to one equivalent RT bronchodilator order with QID Frequency and an Albuterol order with Frequency of every 4 hours PRN wheezing or increased work of breathing using Per Protocol order mode. Greater than 13 - enter or revise RT Bronchodilator order(s) to one equivalent RT bronchodilator order with every 4 hours Frequency and an Albuterol order with Frequency of every 2 hours PRN wheezing or increased work of breathing using Per Protocol order mode. RT to enter RT Home Evaluation for COPD & MDI Assessment order using Per Protocol order mode.     Electronically signed by Dav Meng RCP on 3/4/2023 at 10:05 AM English Discharged

## 2023-03-04 NOTE — RT PROTOCOL NOTE
RT Inhaler-Nebulizer Bronchodilator Protocol Note    There is a bronchodilator order in the chart from a provider indicating to follow the RT Bronchodilator Protocol and there is an “Initiate RT Inhaler-Nebulizer Bronchodilator Protocol” order as well (see protocol at bottom of note).    CXR Findings:  No results found.    The findings from the last RT Protocol Assessment were as follows:   History Pulmonary Disease: Chronic pulmonary disease  Respiratory Pattern: Mild dyspnea at rest, irregular pattern, or RR 21-25 bpm  Breath Sounds: Slightly diminished and/or crackles  Cough: Strong, spontaneous, non-productive  Indication for Bronchodilator Therapy: Decreased or absent breath sounds  Bronchodilator Assessment Score: 8    Aerosolized bronchodilator medication orders have been revised according to the RT Inhaler-Nebulizer Bronchodilator Protocol below.    Respiratory Therapist to perform RT Therapy Protocol Assessment initially then follow the protocol.  Repeat RT Therapy Protocol Assessment PRN for score 0-3 or on second treatment, BID, and PRN for scores above 3.    No Indications - adjust the frequency to every 6 hours PRN wheezing or bronchospasm, if no treatments needed after 48 hours then discontinue using Per Protocol order mode.     If indication present, adjust the RT bronchodilator orders based on the Bronchodilator Assessment Score as indicated below.  Use Inhaler orders unless patient has one or more of the following: on home nebulizer, not able to hold breath for 10 seconds, is not alert and oriented, cannot activate and use MDI correctly, or respiratory rate 25 breaths per minute or more, then use the equivalent nebulizer order(s) with same Frequency and PRN reasons based on the score.  If a patient is on this medication at home then do not decrease Frequency below that used at home.    0-3 - enter or revise RT bronchodilator order(s) to equivalent RT Bronchodilator order with Frequency of every 4  hours PRN for wheezing or increased work of breathing using Per Protocol order mode. 4-6 - enter or revise RT Bronchodilator order(s) to two equivalent RT bronchodilator orders with one order with BID Frequency and one order with Frequency of every 4 hours PRN wheezing or increased work of breathing using Per Protocol order mode. 7-10 - enter or revise RT Bronchodilator order(s) to two equivalent RT bronchodilator orders with one order with TID Frequency and one order with Frequency of every 4 hours PRN wheezing or increased work of breathing using Per Protocol order mode. 11-13 - enter or revise RT Bronchodilator order(s) to one equivalent RT bronchodilator order with QID Frequency and an Albuterol order with Frequency of every 4 hours PRN wheezing or increased work of breathing using Per Protocol order mode. Greater than 13 - enter or revise RT Bronchodilator order(s) to one equivalent RT bronchodilator order with every 4 hours Frequency and an Albuterol order with Frequency of every 2 hours PRN wheezing or increased work of breathing using Per Protocol order mode. RT to enter RT Home Evaluation for COPD & MDI Assessment order using Per Protocol order mode.     Electronically signed by Sarah Bauer RCP on 3/4/2023 at 5:29 PM

## 2023-03-04 NOTE — PROGRESS NOTES
Office : [phone]     Fax :[phone]         Renal Progress Note  Subjective:   Admit Date: 3/1/2023     SUKHI Kahn is a 38 y.o. male with prior history of COPD, hypertension, schizoaffective disorder, CHF, diabetes who presents to the ED with complaint of Dizziness, SOB and Hyperglycemia since morning of presentation. Seemed acute in onset with progressive worsening. Had assoc N/V prior to presentation. Pt was recently admitted for CHF exacerbation and followed up with cardiology since discharge. Pt remained hypertensive and his Hydralazine and Lasix doses were increased. He was seen by nephrology outpt who recommended hypercortisolism workup given clinical signs and referral to Endo. Labs were not completed. Pt was not seen by endo yet. Pt states that his presenting symptoms have improved. He endorses facial plethora for more than several weeks. He also endorses LE edema, and being unable to control his blood sugars. Interval History:     BP control improving with current BP regimen      Seen at the bedside, pretty tired. Pt had apparently fallen to his knees after getting out of bed without any aid. He now has a sitter at the bedside. His COVID came back positive. His BP's are slightly improved but still up to 789 systolic in the early AM. Otherwise, he has no acute complaints. DIET ADULT DIET; Regular; 3 carb choices (45 gm/meal);  Low Sodium (2 gm)  Medications:   Scheduled Meds:   insulin glargine  50 Units SubCUTAneous BID    And    insulin glargine  50 Units SubCUTAneous BID    insulin lispro  27 Units SubCUTAneous TID  [START ON 3/5/2023] spironolactone  100 mg Oral Daily    [START ON 3/5/2023] NIFEdipine  90 mg Oral Daily    polyethylene glycol  17 g Oral Daily    albuterol-ipratropium  1 puff Inhalation BID    sodium chloride flush  5-40 mL IntraVENous 2 times per day    enoxaparin  40 mg SubCUTAneous BID    insulin lispro  0-16 Units SubCUTAneous TID WC    insulin lispro  0-4 Units SubCUTAneous Nightly    atorvastatin  80 mg Oral Nightly    haloperidol  20 mg Oral TID    benztropine  1 mg Oral BID    lidocaine  1 patch TransDERmal Daily    hydrALAZINE  100 mg Oral TID    aspirin  81 mg Oral Daily    hydrOXYzine HCl  25 mg Oral TID    senna  1 tablet Oral BID    pantoprazole  40 mg Oral BID AC    divalproex  500 mg Oral TID    fenofibrate  160 mg Oral Daily    carvedilol  50 mg Oral BID WC    nicotine  1 patch TransDERmal Daily    QUEtiapine  200 mg Oral Q6H     Continuous Infusions:   sodium chloride      dextrose         Labs:  CBC:   Recent Labs     03/01/23 2119 03/03/23  0740 03/04/23  0512   WBC 5.5 6.5 5.1   HGB 12.7* 12.6* 12.3*    183 177       BMP:    Recent Labs     03/02/23  1633 03/03/23  0740 03/04/23  0513   * 136 130*   K 4.1 4.2 4.0   CL 95* 97* 92*   CO2 27 30 29   BUN 18 20 20   CREATININE 1.0 1.2 1.3   GLUCOSE 433* 315* 508*       Ca/Mg/Phos:   Recent Labs     03/02/23  1633 03/03/23  0740 03/04/23  0513   CALCIUM 8.9 9.2 8.8   MG  --  1.80 1.90   PHOS 3.0  --   --        Hepatic:   Recent Labs     03/03/23  1102   AST 22   ALT 71*   BILITOT <0.2   ALKPHOS 86     Troponin:   Recent Labs     03/01/23 2119 03/01/23  2358   TROPONINI 0.10* 0.09*       BNP: No results for input(s): BNP in the last 72 hours. Lipids: No results for input(s): CHOL, TRIG, HDL, LDLCALC, LABVLDL in the last 72 hours. ABGs: No results for input(s): PHART, PO2ART, OGQ0FZH in the last 72 hours. INR: No results for input(s): INR in the last 72 hours.   UA:  Recent Labs     03/01/23  2224   COLORU Yellow   CLARITYU Clear GLUCOSEU >=1000*   BILIRUBINUR Negative   KETUA Negative   SPECGRAV 1.015   BLOODU Negative   PHUR 6.0   PROTEINU TRACE*   UROBILINOGEN 1.0   NITRU Negative   LEUKOCYTESUR Negative   LABMICR YES   URINETYPE Voided        Urine Microscopic:   Recent Labs     03/01/23  2224   BACTERIA Rare*   WBCUA None seen   RBCUA None seen   EPIU 0-1       Urine Culture: No results for input(s): LABURIN in the last 72 hours. Urine Chemistry: No results for input(s): Suann Pummel, PROTEINUR, NAUR in the last 72 hours. Objective:   Vitals: BP (!) 167/127   Pulse 98   Temp 97.5 °F (36.4 °C) (Oral)   Resp 20   Ht 6' 1\" (1.854 m)   Wt (!) 326 lb 1 oz (147.9 kg)   SpO2 96%   BMI 43.02 kg/m²    Wt Readings from Last 3 Encounters:   03/04/23 (!) 326 lb 1 oz (147.9 kg)   02/21/23 (!) 333 lb (151 kg)   02/15/23 (!) 317 lb 0.3 oz (143.8 kg)      24HR INTAKE/OUTPUT:    Intake/Output Summary (Last 24 hours) at 3/4/2023 1033  Last data filed at 3/4/2023 0911  Gross per 24 hour   Intake 2660 ml   Output 3675 ml   Net -1015 ml       Constitutional:  OAA X3 NAD, morbidly obese, very fatigued  Skin: no rash, turgor wnl  Heent:  eomi, mmm, moon facies, facial flushing, buffalo hump  Neck: no bruits or jvd noted  Cardiovascular:  S1, S2 without m/r/g  Respiratory: CTA B without w/r/r  Abdomen:  +bs, soft, nt, nd, increased abdominal girth, no noticeable striae  Ext: + lower extremity edema  Psychiatric: mood and affect appropriate  Musculoskeletal:  Rom, muscular strength intact    Assessment and Plan:       IMAGING:  CT CHEST PULMONARY EMBOLISM W CONTRAST   Final Result      1. No evidence of any pulmonary thromboembolus, aneurysm or dissection. 2. Patchy multifocal airspace disease predominantly in the right lower lobe and left lower lobe with some patchy groundglass minimal upper lobe changes in both lungs, most likely suggestive of atypical pneumonia or viral pneumonia or pneumonitis.     Correlate clinically      XR CHEST (2 VW)   Final Result      Interval clearing of airspace disease throughout both lungs since prior exam from February 2023. No acute chest process.           Assessment/Plan   Hypertensive urgency  As evidenced by elevated BP >180/120, no end-organ damage  BP's during last admission ranged 150-170's  BP in recent (2/21/23) cardiology office was 160/120  Possibly 2/2 hypercortisolism     Anemia     Acid- base/ Electrolyte imbalance     Uncontrolled DM with hyperglycemia     Obesity    COVID +     PLAN:    Overall BP control improving with current BP meds , will continue with current regimen    Would not aggressively drop BP's  Better glucose control can aid in decreasing BP's    Continue Hydralazine 100mg TID (recently increased), Carvedilol 50mg BID, spironolactone 50mg qd      Continue Lasix 40mg BID (recently increased)  Nifedipine 30mg qd added starting this AM  Aldosterone level/metanephrines already ordered and pending, will add renin to obtain ratio - pending  Will order timed AM cortisol, Urine free cortisol (will need to be >3x ULN) -pending  Can also perform an overnight dexa suppression test  Will continue to monitor  Labs in Gracy Arce MD  [facility] [address] [place], [place] [phone]  [phone]       Plan of care discussed with Resident  Agree with above assessment and plan

## 2023-03-04 NOTE — RT PROTOCOL NOTE
RT Inhaler-Nebulizer Bronchodilator Protocol Note    There is a bronchodilator order in the chart from a provider indicating to follow the RT Bronchodilator Protocol and there is an Initiate RT Inhaler-Nebulizer Bronchodilator Protocol order as well (see protocol at bottom of note). CXR Findings:  No results found. The findings from the last RT Protocol Assessment were as follows:   History Pulmonary Disease: Chronic pulmonary disease  Respiratory Pattern: Dyspnea on exertion or RR 21-25 bpm  Breath Sounds: Slightly diminished and/or crackles  Cough: Strong, spontaneous, non-productive  Indication for Bronchodilator Therapy:    Bronchodilator Assessment Score: 6    Aerosolized bronchodilator medication orders have been revised according to the RT Inhaler-Nebulizer Bronchodilator Protocol below. Respiratory Therapist to perform RT Therapy Protocol Assessment initially then follow the protocol. Repeat RT Therapy Protocol Assessment PRN for score 0-3 or on second treatment, BID, and PRN for scores above 3. No Indications - adjust the frequency to every 6 hours PRN wheezing or bronchospasm, if no treatments needed after 48 hours then discontinue using Per Protocol order mode. If indication present, adjust the RT bronchodilator orders based on the Bronchodilator Assessment Score as indicated below. Use Inhaler orders unless patient has one or more of the following: on home nebulizer, not able to hold breath for 10 seconds, is not alert and oriented, cannot activate and use MDI correctly, or respiratory rate 25 breaths per minute or more, then use the equivalent nebulizer order(s) with same Frequency and PRN reasons based on the score. If a patient is on this medication at home then do not decrease Frequency below that used at home.     0-3 - enter or revise RT bronchodilator order(s) to equivalent RT Bronchodilator order with Frequency of every 4 hours PRN for wheezing or increased work of breathing using Per Protocol order mode. 4-6 - enter or revise RT Bronchodilator order(s) to two equivalent RT bronchodilator orders with one order with BID Frequency and one order with Frequency of every 4 hours PRN wheezing or increased work of breathing using Per Protocol order mode. 7-10 - enter or revise RT Bronchodilator order(s) to two equivalent RT bronchodilator orders with one order with TID Frequency and one order with Frequency of every 4 hours PRN wheezing or increased work of breathing using Per Protocol order mode. 11-13 - enter or revise RT Bronchodilator order(s) to one equivalent RT bronchodilator order with QID Frequency and an Albuterol order with Frequency of every 4 hours PRN wheezing or increased work of breathing using Per Protocol order mode. Greater than 13 - enter or revise RT Bronchodilator order(s) to one equivalent RT bronchodilator order with every 4 hours Frequency and an Albuterol order with Frequency of every 2 hours PRN wheezing or increased work of breathing using Per Protocol order mode. RT to enter RT Home Evaluation for COPD & MDI Assessment order using Per Protocol order mode.     Electronically signed by Marcellus Clifford RCP on 3/3/2023 at 8:59 PM

## 2023-03-04 NOTE — PROGRESS NOTES
Internal Medicine Progress Note    Date: 3/4/2023   Patient: [address] Day: 2      CC: Hyperglycemia       Interval Hx   Patient seen at bedside this morning. He is sitting up in bed having breakfast. He denies any chest pain, cough or SOB.  this morning. Lantus increased to 100 BID from 90 BID, Lispro increased to 27 units TID from 25. Endocrinology consulted for evaluation and input in management of his underlying bilateral adrenal hyperplasia. QTc 479 today. Nifedipine increased 90 mg from 60 mg, spironolactone increased to 100 mg from 50 mg to start 3/5. /127. HR 98, afebrile, 96% on RA. Objective     Vital Signs:  Patient Vitals for the past 8 hrs:   BP Temp Temp src Pulse Resp SpO2 Weight   03/04/23 0741 (!) 167/127 97.5 °F (36.4 °C) Oral 98 20 96 % --   03/04/23 0600 -- -- -- 94 -- -- --   03/04/23 0411 (!) 161/124 97.8 °F (36.6 °C) Oral 100 22 93 % (!) 326 lb 1 oz (147.9 kg)   03/04/23 0400 -- -- -- 100 -- -- --   03/04/23 0200 -- -- -- 94 -- -- --         Physical Exam  Constitutional:       General: He is not in acute distress. Appearance: He is obese. He is ill-appearing. Pulmonary:      Effort: Pulmonary effort is normal. No respiratory distress. Breath sounds: Normal breath sounds. No wheezing. Abdominal:      General: Bowel sounds are normal.      Palpations: Abdomen is soft. Tenderness: There is no abdominal tenderness. Musculoskeletal:      Right lower leg: Edema present. Left lower leg: Edema present. Skin:     General: Skin is warm and dry. Capillary Refill: Capillary refill takes less than 2 seconds. Neurological:      Mental Status: He is alert and oriented to person, place, and time.           Labs:  CBC:   Recent Labs     03/01/23  2119 03/03/23  0740 03/04/23  0512   WBC 5.5 6.5 5.1   HGB 12.7* 12.6* 12.3*   HCT 40.6 39.3* 38.8*    183 177         BMP:   Recent Labs     03/02/23  1633 03/03/23  0740 03/04/23  0513   * 136 130*   K 4.1 4.2 4.0   CL 95* 97* 92*   CO2 27 30 29   BUN 18 20 20   CREATININE 1.0 1.2 1.3   GLUCOSE 433* 315* 508*   PHOS 3.0  --   --        Magnesium:   Recent Labs     03/03/23  0740 03/04/23  0513   MG 1.80 1.90     LFT's:   Recent Labs     03/03/23  1102   AST 22   ALT 71*   BILITOT <0.2   ALKPHOS 86         U/A:   Recent Labs     03/01/23  2224   COLORU Yellow   PHUR 6.0   WBCUA None seen   RBCUA None seen   MUCUS 1+*   BACTERIA Rare*   CLARITYU Clear   SPECGRAV 1.015   LEUKOCYTESUR Negative   UROBILINOGEN 1.0   BILIRUBINUR Negative   BLOODU Negative   GLUCOSEU >=1000*         Radiology:  CT CHEST PULMONARY EMBOLISM W CONTRAST   Final Result      1. No evidence of any pulmonary thromboembolus, aneurysm or dissection. 2. Patchy multifocal airspace disease predominantly in the right lower lobe and left lower lobe with some patchy groundglass minimal upper lobe changes in both lungs, most likely suggestive of atypical pneumonia or viral pneumonia or pneumonitis. Correlate clinically      XR CHEST (2 VW)   Final Result      Interval clearing of airspace disease throughout both lungs since prior exam from February 2023. No acute chest process. Assessment & Plan     COVID 19 Pneumonia  -Patient presented with complaints of cough productive of brownish sputum, shortness of breath, new oxygen requirement on 2 L O2 NC, Pro arpit neg , ESR 3, CRP 8.5, MRSA DNA probe neg, COVID 19 +ve  -CT PE with no evidence of PE, aneurysm or dissection.  Patchy multifocal airspace disease predominantly in the right lower lobe and left lower lobe with some patchy groundglass minimal upper lobe changes in both lungs, most likely suggestive of atypical pneumonia or viral pneumonia or pneumonitis  -Culture, respiratory, urine Legionella antigen, respiratory virus mini panel pending    -Antibiotics discontinued  -Continue symptomatic management     Hypertensive Urgency  Likely 2/2 to bilateral adrenal nodular hyperplasia   -BP on admission was 202/135, CT Adrenals: 11/19/2022 with diffuse nodular adrenal hyperplasia. -CXR: no evidence of pulm edema.   -Serum cortisol elevated, 30.1, aldosterone 4.5  -Last  echo HFpEF 50%, 11/2022  -Gradually reduce SBP to goal < 130  -Continue carvedilol, hydralazine, spironolactone, nifedipine  -Monitor BP  -Endocrinology consulted: appreciate recs. Diabetes Mellitus type II   Poorly controlled   -Blood glucose  on admission was 405. -A1c on 11/15/2022 was 12.6, repeat A1c 11.6 3/3/23  -Continue Lantus 100 BID   -27 units lispro TID  -HDSSI, hypoglycemic protocol, POC glucose checks   -Monitor blood glucose     Elevated troponin  Likely demand ischemia secondary to hypertensive urgency  -Troponin elevated to 0.1 > 0.09  -EKG with no acute ischemic changes, QTC prolonged to 474.   -On continuous telemetry     HFrEF  -19 lb weight gain from prior admission in 2/14, weighed 311 lb, currently weighing 330 lb. -Echo 11/22: EF 50%, indeterminate diastolic dysfunction   -Continue Lasix, hydralazine and carvedilol  -Strict Is and Os  -Daily standing weights  -Low sodium diet     Chronic medical conditions   Schizoaffective disorder:   -On quetiapine, haloperidol, depakote, benztropine, seroquel    -Monitor Qtc, daily EKGs. Mixed Hyperlipidemia:   -Continue fenofibrate, atorvastatin     DVT PPx: Lovenox   Diet: ADULT DIET; Regular; 3 carb choices (45 gm/meal);  Low Sodium (2 gm)   Code status:  Full Code   Disposition: IP      Will discuss with attending physician Hilda Salinas MD     _____________________  Lg Parker MD   Internal Medicine Resident, PGY-1

## 2023-03-05 LAB
ANION GAP SERPL CALCULATED.3IONS-SCNC: 9 MMOL/L (ref 3–16)
BETA-HYDROXYBUTYRATE: 0.15 MMOL/L (ref 0–0.27)
BUN BLDV-MCNC: 19 MG/DL (ref 7–20)
CALCIUM SERPL-MCNC: 9.5 MG/DL (ref 8.3–10.6)
CHLORIDE BLD-SCNC: 99 MMOL/L (ref 99–110)
CO2: 30 MMOL/L (ref 21–32)
CREAT SERPL-MCNC: 1.2 MG/DL (ref 0.9–1.3)
GFR SERPL CREATININE-BSD FRML MDRD: >60 ML/MIN/{1.73_M2}
GLUCOSE BLD-MCNC: 148 MG/DL (ref 70–99)
GLUCOSE BLD-MCNC: 185 MG/DL (ref 70–99)
GLUCOSE BLD-MCNC: 187 MG/DL (ref 70–99)
GLUCOSE BLD-MCNC: 247 MG/DL (ref 70–99)
GLUCOSE BLD-MCNC: 263 MG/DL (ref 70–99)
GLUCOSE BLD-MCNC: 267 MG/DL (ref 70–99)
GLUCOSE BLD-MCNC: 283 MG/DL (ref 70–99)
GLUCOSE BLD-MCNC: 310 MG/DL (ref 70–99)
HCT VFR BLD CALC: 40.3 % (ref 40.5–52.5)
HEMOGLOBIN: 12.9 G/DL (ref 13.5–17.5)
MAGNESIUM: 1.9 MG/DL (ref 1.8–2.4)
MCH RBC QN AUTO: 26.3 PG (ref 26–34)
MCHC RBC AUTO-ENTMCNC: 32 G/DL (ref 31–36)
MCV RBC AUTO: 82.1 FL (ref 80–100)
METANEPH/PLASMA INTERP: ABNORMAL
METANEPHRINE FREE PLASMA: 0.14 NMOL/L (ref 0–0.49)
NORMETANEPHRINE FREE PLASMA: 1.49 NMOL/L (ref 0–0.89)
PDW BLD-RTO: 15.2 % (ref 12.4–15.4)
PERFORMED ON: ABNORMAL
PLATELET # BLD: 179 K/UL (ref 135–450)
PMV BLD AUTO: 9.8 FL (ref 5–10.5)
POTASSIUM SERPL-SCNC: 3.9 MMOL/L (ref 3.5–5.1)
RBC # BLD: 4.91 M/UL (ref 4.2–5.9)
RENIN ACTIVITY: 1.3 NG/ML/HR
SODIUM BLD-SCNC: 138 MMOL/L (ref 136–145)
T3 FREE: 1.7 PG/ML (ref 2.3–4.2)
T4 FREE: 1 NG/DL (ref 0.9–1.8)
WBC # BLD: 5.9 K/UL (ref 4–11)

## 2023-03-05 PROCEDURE — 84481 FREE ASSAY (FT-3): CPT

## 2023-03-05 PROCEDURE — 94640 AIRWAY INHALATION TREATMENT: CPT

## 2023-03-05 PROCEDURE — 85027 COMPLETE CBC AUTOMATED: CPT

## 2023-03-05 PROCEDURE — 6370000000 HC RX 637 (ALT 250 FOR IP): Performed by: INTERNAL MEDICINE

## 2023-03-05 PROCEDURE — 93005 ELECTROCARDIOGRAM TRACING: CPT | Performed by: NURSE PRACTITIONER

## 2023-03-05 PROCEDURE — 84439 ASSAY OF FREE THYROXINE: CPT

## 2023-03-05 PROCEDURE — 36415 COLL VENOUS BLD VENIPUNCTURE: CPT

## 2023-03-05 PROCEDURE — 2580000003 HC RX 258: Performed by: STUDENT IN AN ORGANIZED HEALTH CARE EDUCATION/TRAINING PROGRAM

## 2023-03-05 PROCEDURE — 82024 ASSAY OF ACTH: CPT

## 2023-03-05 PROCEDURE — 83835 ASSAY OF METANEPHRINES: CPT

## 2023-03-05 PROCEDURE — 94761 N-INVAS EAR/PLS OXIMETRY MLT: CPT

## 2023-03-05 PROCEDURE — 6370000000 HC RX 637 (ALT 250 FOR IP)

## 2023-03-05 PROCEDURE — 6360000002 HC RX W HCPCS: Performed by: STUDENT IN AN ORGANIZED HEALTH CARE EDUCATION/TRAINING PROGRAM

## 2023-03-05 PROCEDURE — 2500000003 HC RX 250 WO HCPCS: Performed by: INTERNAL MEDICINE

## 2023-03-05 PROCEDURE — 83735 ASSAY OF MAGNESIUM: CPT

## 2023-03-05 PROCEDURE — 6370000000 HC RX 637 (ALT 250 FOR IP): Performed by: STUDENT IN AN ORGANIZED HEALTH CARE EDUCATION/TRAINING PROGRAM

## 2023-03-05 PROCEDURE — 2060000000 HC ICU INTERMEDIATE R&B

## 2023-03-05 PROCEDURE — 80048 BASIC METABOLIC PNL TOTAL CA: CPT

## 2023-03-05 PROCEDURE — 6360000002 HC RX W HCPCS: Performed by: HOSPITALIST

## 2023-03-05 PROCEDURE — 99233 SBSQ HOSP IP/OBS HIGH 50: CPT | Performed by: HOSPITALIST

## 2023-03-05 PROCEDURE — 6370000000 HC RX 637 (ALT 250 FOR IP): Performed by: HOSPITALIST

## 2023-03-05 RX ORDER — SENNA AND DOCUSATE SODIUM 50; 8.6 MG/1; MG/1
2 TABLET, FILM COATED ORAL DAILY
Status: DISCONTINUED | OUTPATIENT
Start: 2023-03-05 | End: 2023-03-06

## 2023-03-05 RX ORDER — HYDROCHLOROTHIAZIDE 25 MG/1
25 TABLET ORAL DAILY
Status: DISCONTINUED | OUTPATIENT
Start: 2023-03-05 | End: 2023-03-06 | Stop reason: HOSPADM

## 2023-03-05 RX ORDER — HYDRALAZINE HYDROCHLORIDE 100 MG/1
100 TABLET, FILM COATED ORAL 3 TIMES DAILY
Status: DISCONTINUED | OUTPATIENT
Start: 2023-03-06 | End: 2023-03-06 | Stop reason: HOSPADM

## 2023-03-05 RX ORDER — OMEGA-3-ACID ETHYL ESTERS 1 G/1
2 CAPSULE, LIQUID FILLED ORAL 2 TIMES DAILY
Status: DISCONTINUED | OUTPATIENT
Start: 2023-03-05 | End: 2023-03-06 | Stop reason: HOSPADM

## 2023-03-05 RX ORDER — EPLERENONE 25 MG/1
25 TABLET, FILM COATED ORAL 2 TIMES DAILY
Status: DISCONTINUED | OUTPATIENT
Start: 2023-03-06 | End: 2023-03-06 | Stop reason: HOSPADM

## 2023-03-05 RX ORDER — HYDRALAZINE HYDROCHLORIDE 50 MG/1
50 TABLET, FILM COATED ORAL 3 TIMES DAILY
Status: COMPLETED | OUTPATIENT
Start: 2023-03-05 | End: 2023-03-05

## 2023-03-05 RX ORDER — MINOXIDIL 2.5 MG/1
5 TABLET ORAL DAILY
Status: DISCONTINUED | OUTPATIENT
Start: 2023-03-05 | End: 2023-03-05

## 2023-03-05 RX ADMIN — PANTOPRAZOLE SODIUM 40 MG: 40 TABLET, DELAYED RELEASE ORAL at 05:10

## 2023-03-05 RX ADMIN — ENOXAPARIN SODIUM 40 MG: 100 INJECTION SUBCUTANEOUS at 05:10

## 2023-03-05 RX ADMIN — IPRATROPIUM BROMIDE AND ALBUTEROL 1 PUFF: 20; 100 SPRAY, METERED RESPIRATORY (INHALATION) at 21:41

## 2023-03-05 RX ADMIN — PANTOPRAZOLE SODIUM 40 MG: 40 TABLET, DELAYED RELEASE ORAL at 14:46

## 2023-03-05 RX ADMIN — HYDRALAZINE HYDROCHLORIDE 50 MG: 50 TABLET, FILM COATED ORAL at 20:35

## 2023-03-05 RX ADMIN — HYDROXYZINE HYDROCHLORIDE 25 MG: 25 TABLET ORAL at 14:47

## 2023-03-05 RX ADMIN — INSULIN LISPRO 27 UNITS: 100 INJECTION, SOLUTION INTRAVENOUS; SUBCUTANEOUS at 07:44

## 2023-03-05 RX ADMIN — ASPIRIN 81 MG 81 MG: 81 TABLET ORAL at 09:21

## 2023-03-05 RX ADMIN — INSULIN GLARGINE 50 UNITS: 100 INJECTION, SOLUTION SUBCUTANEOUS at 20:41

## 2023-03-05 RX ADMIN — ENOXAPARIN SODIUM 40 MG: 100 INJECTION SUBCUTANEOUS at 17:36

## 2023-03-05 RX ADMIN — HALOPERIDOL 20 MG: 5 TABLET ORAL at 09:23

## 2023-03-05 RX ADMIN — SODIUM CHLORIDE, PRESERVATIVE FREE 10 ML: 5 INJECTION INTRAVENOUS at 09:23

## 2023-03-05 RX ADMIN — HYDROXYZINE HYDROCHLORIDE 25 MG: 25 TABLET ORAL at 09:21

## 2023-03-05 RX ADMIN — INSULIN LISPRO 12 UNITS: 100 INJECTION, SOLUTION INTRAVENOUS; SUBCUTANEOUS at 13:17

## 2023-03-05 RX ADMIN — DIVALPROEX SODIUM 500 MG: 125 CAPSULE, COATED PELLETS ORAL at 09:20

## 2023-03-05 RX ADMIN — MINOXIDIL 5 MG: 2.5 TABLET ORAL at 09:20

## 2023-03-05 RX ADMIN — SENNOSIDES 8.6 MG: 8.6 TABLET, COATED ORAL at 09:21

## 2023-03-05 RX ADMIN — BENZTROPINE MESYLATE 1 MG: 1 TABLET ORAL at 09:21

## 2023-03-05 RX ADMIN — QUETIAPINE FUMARATE 200 MG: 200 TABLET ORAL at 05:10

## 2023-03-05 RX ADMIN — LABETALOL HYDROCHLORIDE 10 MG: 5 INJECTION, SOLUTION INTRAVENOUS at 05:10

## 2023-03-05 RX ADMIN — SODIUM CHLORIDE, PRESERVATIVE FREE 10 ML: 5 INJECTION INTRAVENOUS at 20:45

## 2023-03-05 RX ADMIN — HALOPERIDOL 20 MG: 5 TABLET ORAL at 20:34

## 2023-03-05 RX ADMIN — HYDRALAZINE HYDROCHLORIDE 100 MG: 100 TABLET, FILM COATED ORAL at 09:21

## 2023-03-05 RX ADMIN — HALOPERIDOL 20 MG: 5 TABLET ORAL at 15:27

## 2023-03-05 RX ADMIN — DIVALPROEX SODIUM 500 MG: 125 CAPSULE, COATED PELLETS ORAL at 14:46

## 2023-03-05 RX ADMIN — SENNOSIDES AND DOCUSATE SODIUM 2 TABLET: 50; 8.6 TABLET ORAL at 09:19

## 2023-03-05 RX ADMIN — DIVALPROEX SODIUM 500 MG: 125 CAPSULE, COATED PELLETS ORAL at 20:33

## 2023-03-05 RX ADMIN — INSULIN LISPRO 8 UNITS: 100 INJECTION, SOLUTION INTRAVENOUS; SUBCUTANEOUS at 17:32

## 2023-03-05 RX ADMIN — FENOFIBRATE 160 MG: 160 TABLET, FILM COATED ORAL at 09:21

## 2023-03-05 RX ADMIN — OMEGA-3-ACID ETHYL ESTERS 2 G: 1 CAPSULE, LIQUID FILLED ORAL at 20:34

## 2023-03-05 RX ADMIN — ATORVASTATIN CALCIUM 80 MG: 80 TABLET, FILM COATED ORAL at 20:32

## 2023-03-05 RX ADMIN — INSULIN GLARGINE 50 UNITS: 100 INJECTION, SOLUTION SUBCUTANEOUS at 09:54

## 2023-03-05 RX ADMIN — CARVEDILOL 50 MG: 25 TABLET, FILM COATED ORAL at 16:43

## 2023-03-05 RX ADMIN — QUETIAPINE FUMARATE 200 MG: 200 TABLET ORAL at 22:58

## 2023-03-05 RX ADMIN — INSULIN LISPRO 27 UNITS: 100 INJECTION, SOLUTION INTRAVENOUS; SUBCUTANEOUS at 12:20

## 2023-03-05 RX ADMIN — NIFEDIPINE 90 MG: 30 TABLET, EXTENDED RELEASE ORAL at 09:20

## 2023-03-05 RX ADMIN — INSULIN GLARGINE 50 UNITS: 100 INJECTION, SOLUTION SUBCUTANEOUS at 20:43

## 2023-03-05 RX ADMIN — HYDRALAZINE HYDROCHLORIDE 100 MG: 100 TABLET, FILM COATED ORAL at 14:46

## 2023-03-05 RX ADMIN — LIDOCAINE HYDROCHLORIDE: 20 SOLUTION ORAL; TOPICAL at 09:21

## 2023-03-05 RX ADMIN — IPRATROPIUM BROMIDE AND ALBUTEROL 1 PUFF: 20; 100 SPRAY, METERED RESPIRATORY (INHALATION) at 11:38

## 2023-03-05 RX ADMIN — QUETIAPINE FUMARATE 200 MG: 200 TABLET ORAL at 16:43

## 2023-03-05 RX ADMIN — INSULIN LISPRO 27 UNITS: 100 INJECTION, SOLUTION INTRAVENOUS; SUBCUTANEOUS at 16:42

## 2023-03-05 RX ADMIN — CARVEDILOL 50 MG: 25 TABLET, FILM COATED ORAL at 07:46

## 2023-03-05 RX ADMIN — INSULIN GLARGINE 50 UNITS: 100 INJECTION, SOLUTION SUBCUTANEOUS at 09:55

## 2023-03-05 RX ADMIN — IPRATROPIUM BROMIDE AND ALBUTEROL 1 PUFF: 20; 100 SPRAY, METERED RESPIRATORY (INHALATION) at 15:36

## 2023-03-05 RX ADMIN — HYDROCHLOROTHIAZIDE 25 MG: 25 TABLET ORAL at 09:22

## 2023-03-05 RX ADMIN — QUETIAPINE FUMARATE 200 MG: 200 TABLET ORAL at 12:19

## 2023-03-05 RX ADMIN — HYDROXYZINE HYDROCHLORIDE 25 MG: 25 TABLET ORAL at 20:32

## 2023-03-05 RX ADMIN — SENNOSIDES 8.6 MG: 8.6 TABLET, COATED ORAL at 20:32

## 2023-03-05 RX ADMIN — OMEGA-3-ACID ETHYL ESTERS 2 G: 1 CAPSULE, LIQUID FILLED ORAL at 16:43

## 2023-03-05 RX ADMIN — IPRATROPIUM BROMIDE AND ALBUTEROL 1 PUFF: 20; 100 SPRAY, METERED RESPIRATORY (INHALATION) at 07:36

## 2023-03-05 RX ADMIN — SPIRONOLACTONE 100 MG: 50 TABLET ORAL at 09:20

## 2023-03-05 RX ADMIN — HYDRALAZINE HYDROCHLORIDE 20 MG: 20 INJECTION INTRAMUSCULAR; INTRAVENOUS at 00:40

## 2023-03-05 RX ADMIN — BENZTROPINE MESYLATE 1 MG: 1 TABLET ORAL at 20:32

## 2023-03-05 ASSESSMENT — PAIN DESCRIPTION - PAIN TYPE
TYPE: ACUTE PAIN
TYPE: ACUTE PAIN

## 2023-03-05 ASSESSMENT — PAIN DESCRIPTION - DESCRIPTORS
DESCRIPTORS: ACHING
DESCRIPTORS: ACHING

## 2023-03-05 ASSESSMENT — PAIN - FUNCTIONAL ASSESSMENT
PAIN_FUNCTIONAL_ASSESSMENT: ACTIVITIES ARE NOT PREVENTED
PAIN_FUNCTIONAL_ASSESSMENT: ACTIVITIES ARE NOT PREVENTED

## 2023-03-05 ASSESSMENT — PAIN SCALES - GENERAL
PAINLEVEL_OUTOF10: 5
PAINLEVEL_OUTOF10: 3
PAINLEVEL_OUTOF10: 5
PAINLEVEL_OUTOF10: 3
PAINLEVEL_OUTOF10: 0

## 2023-03-05 ASSESSMENT — PAIN DESCRIPTION - ONSET
ONSET: PROGRESSIVE
ONSET: GRADUAL

## 2023-03-05 ASSESSMENT — PAIN DESCRIPTION - FREQUENCY
FREQUENCY: INTERMITTENT
FREQUENCY: INTERMITTENT

## 2023-03-05 ASSESSMENT — PAIN DESCRIPTION - ORIENTATION
ORIENTATION: POSTERIOR
ORIENTATION: RIGHT;LEFT;UPPER

## 2023-03-05 ASSESSMENT — PAIN DESCRIPTION - LOCATION
LOCATION: HEAD
LOCATION: ABDOMEN

## 2023-03-05 NOTE — PROGRESS NOTES
Internal Medicine Progress Note    Date: 3/5/2023   Patient: Leticia GAMBINO [facility] Day: 3      CC: Hyperglycemia       Interval Hx   Patient seen at bedside.  No overnight events.  Patient is noncompliant with his diet as he keeps eating and is not helping his sugars such as chips and pop.  Patient systolic blood pressures are still in the 170s and his sugars are elevated in the high 200s.  Lantus was increased to 100 total with 27 units 3 times daily with meals.  His systolic blood pressure has been better controlled with the increase of nifedipine yesterday and spironolactone increased.  We will add hydrochlorothiazide this morning for better control.  Denies any symptoms of fevers chills nausea vomiting.  Objective     Vital Signs:  Patient Vitals for the past 8 hrs:   BP Temp Temp src Pulse Resp SpO2 Weight   03/05/23 0615 (!) 165/105 -- -- -- -- -- --   03/05/23 0402 (!) 163/104 97.7 °F (36.5 °C) Axillary 91 22 95 % (!) 328 lb 7.8 oz (149 kg)   03/05/23 0115 (!) 153/101 -- -- -- -- -- --   03/05/23 0040 (!) 172/136 -- -- -- -- -- --         Physical Exam  Constitutional:       General: He is not in acute distress.     Appearance: He is obese. He is ill-appearing.   Pulmonary:      Effort: Pulmonary effort is normal. No respiratory distress.      Breath sounds: Normal breath sounds. No wheezing.   Abdominal:      General: Bowel sounds are normal.      Palpations: Abdomen is soft.      Tenderness: There is no abdominal tenderness.   Musculoskeletal:      Right lower leg: Edema present.      Left lower leg: Edema present.   Skin:     General: Skin is warm and dry.      Capillary Refill: Capillary refill takes less than 2 seconds.   Neurological:      Mental Status: He is alert and oriented to person, place, and time.          Labs:  CBC:   Recent Labs     03/03/23  0740 03/04/23  0512 03/05/23  0509   WBC 6.5 5.1 5.9   HGB 12.6* 12.3* 12.9*   HCT 39.3* 38.8* 40.3*    177 179         BMP:   Recent  Labs     03/02/23  1633 03/03/23  0740 03/04/23  0513 03/05/23  0509   * 136 130* 138   K 4.1 4.2 4.0 3.9   CL 95* 97* 92* 99   CO2 27 30 29 30   BUN 18 20 20 19   CREATININE 1.0 1.2 1.3 1.2   GLUCOSE 433* 315* 508* 185*   PHOS 3.0  --   --   --        Magnesium:   Recent Labs     03/03/23  0740 03/04/23  0513 03/05/23  0509   MG 1.80 1.90 1.90       LFT's:   Recent Labs     03/03/23  1102   AST 22   ALT 71*   BILITOT <0.2   ALKPHOS 86           U/A:   No results for input(s): NITRITE, COLORU, PHUR, LABCAST, WBCUA, RBCUA, MUCUS, TRICHOMONAS, YEAST, BACTERIA, CLARITYU, SPECGRAV, LEUKOCYTESUR, UROBILINOGEN, BILIRUBINUR, BLOODU, GLUCOSEU, KETONES, AMORPHOUS in the last 72 hours. Radiology:  CT CHEST PULMONARY EMBOLISM W CONTRAST   Final Result      1. No evidence of any pulmonary thromboembolus, aneurysm or dissection. 2. Patchy multifocal airspace disease predominantly in the right lower lobe and left lower lobe with some patchy groundglass minimal upper lobe changes in both lungs, most likely suggestive of atypical pneumonia or viral pneumonia or pneumonitis. Correlate clinically      XR CHEST (2 VW)   Final Result      Interval clearing of airspace disease throughout both lungs since prior exam from February 2023. No acute chest process. Assessment & Plan   3/5/2023:  Yesterday the increase in the nifedipine from 60-90 and spironolactone to 100 had helped his blood pressures. We will add hydrochlorothiazide 25 mg today to assess for better blood pressure control as it is in the 160s 170s. Sugars are 280s and the RP but pock short is in the 180s. We will make adjustments if needed but seems like his sugars are going to be in the 200s to high 180s until his adrenal hyperplasia is controlled from his excess cortisol. Patient continues to be on room air denies any COVID symptoms at this time.     COVID 19 Pneumonia  -Patient presented with complaints of cough productive of brownish sputum, shortness of breath, new oxygen requirement on 2 L O2 NC, Pro arpit neg , ESR 3, CRP 8.5, MRSA DNA probe neg, COVID 19 +ve  -CT PE with no evidence of PE, aneurysm or dissection. Patchy multifocal airspace disease predominantly in the right lower lobe and left lower lobe with some patchy groundglass minimal upper lobe changes in both lungs, most likely suggestive of atypical pneumonia or viral pneumonia or pneumonitis  -Culture, respiratory, urine Legionella antigen, respiratory virus mini panel pending    -Antibiotics discontinued  -Continue symptomatic management     Hypertensive Urgency  Likely 2/2 to bilateral adrenal nodular hyperplasia   -BP on admission was 202/135, CT Adrenals: 11/19/2022 with diffuse nodular adrenal hyperplasia. -CXR: no evidence of pulm edema.   -Serum cortisol elevated, 30.1, aldosterone 4.5  -Last  echo HFpEF 50%, 11/2022  -Gradually reduce SBP to goal < 130  -Continue carvedilol, hydralazine, spironolactone, nifedipine  -Monitor BP  -Endocrinology consulted: appreciate recs. Diabetes Mellitus type II   Poorly controlled   -Blood glucose  on admission was 405. -A1c on 11/15/2022 was 12.6, repeat A1c 11.6 3/3/23  -Continue Lantus 100 BID   -27 units lispro TID  -HDSSI, hypoglycemic protocol, POC glucose checks   -Monitor blood glucose     Elevated troponin  Likely demand ischemia secondary to hypertensive urgency  -Troponin elevated to 0.1 > 0.09  -EKG with no acute ischemic changes, QTC prolonged to 474.   -On continuous telemetry     HFrEF  -19 lb weight gain from prior admission in 2/14, weighed 311 lb, currently weighing 330 lb. -Echo 11/22: EF 50%, indeterminate diastolic dysfunction   -Continue Lasix, hydralazine and carvedilol  -Strict Is and Os  -Daily standing weights  -Low sodium diet     Chronic medical conditions   Schizoaffective disorder:   -On quetiapine, haloperidol, depakote, benztropine, seroquel    -Monitor Qtc, daily EKGs.      Mixed Hyperlipidemia: -Continue fenofibrate, atorvastatin     DVT PPx: Lovenox   Diet: ADULT DIET; Regular; 3 carb choices (45 gm/meal);  Low Sodium (2 gm)   Code status:  Full Code   Disposition: IP      Will discuss with attending physician Tariq Ricci MD     _____________________  Rafael Cannon MD   Internal Medicine Resident, PGY-2

## 2023-03-05 NOTE — CONSULTS
Endocrinology  Thanks for calling! Patient interviewed and chart reviewed. More complete consult to follow. Initial impressions:  Hypercorticolism, apparently chronic, with prominent bilateral adrenal hyperplasia Left adrenal 6.6 x 3.8 and Right 7.2 x 3.8 cm  documented on 2019 abdominal CT at which time patient was said to have had abnormal dexamethasone suppression test.  Patient says he has history of hyperthyroidism and used to take antithyroid medication; His current TSH 0.35 is borderline low but not definitely diagnostic of hyperthyroidism as his high serum cortisol (recently 30.1 and 27.2 mcg/dl) could also suppress TSH. Uncontrolled IDDM with recent blood sugars up to > 300 mg/dl despite lantus 50 units bid and Humalog 27 tid + sliding scale; less than his usul home dose of lantus 80 bid and Humalog 30 to 32 tid. Diffuse fatty liiver with hypertriglyceridemia on fenofibrate. 5. Resistant hypertension despite hctz 25 mg , minoxidil 5 mg hydralazine 100 mg tid,   6. Schizoaffective      Initial comments and Recommendations:  Although his aldosterone is not high mineralocorticoid receptor blockde is usually superior to hctz and does not have problem of potassium wasting already present with Cushing's. Would use epleronone instead of spironolactone as he already has gynecomastia. Although Gunnar Rubi is harder to get it may be the best agent in this class. Given rather severe BP, patient needs work up for pheochromocytoma; coincidence of pheo and adrenal cortical secretion of glucocortioids is very uncommon but well described and does rarely occur (I had a case about 2 years ago). Mifepristone is approved for Cushing's with diabetes and can help psychiatric disease as well, sometimes remarkably  ACTH level may help with etiology of his condition, Cushing's disease vs Syndrome.   Patient does not recall head CT or MRI but probably they have been done before, will look for these before repeating. Agents to control hypercorticolism include ketoconazole but with risk of liver disease, so not favored, mifepristone which is usually available from the , though expensive, and Istruisa which is a newer agent that blocks adrenal synthesis. Fish oil may help his liver and triglycerides. SHRUTHI Avery M.D.

## 2023-03-05 NOTE — PROGRESS NOTES
Office : [phone]     Fax :[phone]         Renal Progress Note  Subjective:   Admit Date: 3/1/2023     SUKHI Doyle is a 38 y.o. male with prior history of COPD, hypertension, schizoaffective disorder, CHF, diabetes who presents to the ED with complaint of Dizziness, SOB and Hyperglycemia since morning of presentation. Seemed acute in onset with progressive worsening. Had assoc N/V prior to presentation. Pt was recently admitted for CHF exacerbation and followed up with cardiology since discharge. Pt remained hypertensive and his Hydralazine and Lasix doses were increased. He was seen by nephrology outpt who recommended hypercortisolism workup given clinical signs and referral to Endo. Labs were not completed. Pt was not seen by endo yet. Pt states that his presenting symptoms have improved. He endorses facial plethora for more than several weeks. He also endorses LE edema, and being unable to control his blood sugars. Interval History:     BP remains high  Not in acute distress        DIET ADULT DIET; Regular; 3 carb choices (45 gm/meal);  Low Sodium (2 gm)  Medications:   Scheduled Meds:   hydroCHLOROthiazide  25 mg Oral Daily    minoxidil  5 mg Oral Daily    sennosides-docusate sodium  2 tablet Oral Daily    insulin glargine  50 Units SubCUTAneous BID    And    insulin glargine  50 Units SubCUTAneous BID    insulin lispro  27 Units SubCUTAneous TID WC    spironolactone  100 mg Oral Daily    NIFEdipine  90 mg Oral Daily    albuterol-ipratropium  1 puff Inhalation 4x daily    polyethylene glycol  17 g Oral Daily    sodium chloride flush 5-40 mL IntraVENous 2 times per day    enoxaparin  40 mg SubCUTAneous BID    insulin lispro  0-16 Units SubCUTAneous TID     insulin lispro  0-4 Units SubCUTAneous Nightly    atorvastatin  80 mg Oral Nightly    haloperidol  20 mg Oral TID    benztropine  1 mg Oral BID    lidocaine  1 patch TransDERmal Daily    hydrALAZINE  100 mg Oral TID    aspirin  81 mg Oral Daily    hydrOXYzine HCl  25 mg Oral TID    senna  1 tablet Oral BID    pantoprazole  40 mg Oral BID AC    divalproex  500 mg Oral TID    fenofibrate  160 mg Oral Daily    carvedilol  50 mg Oral BID WC    nicotine  1 patch TransDERmal Daily    QUEtiapine  200 mg Oral Q6H     Continuous Infusions:   sodium chloride      dextrose         Labs:  CBC:   Recent Labs     03/03/23  0740 03/04/23  0512 03/05/23  0509   WBC 6.5 5.1 5.9   HGB 12.6* 12.3* 12.9*    177 179       BMP:    Recent Labs     03/03/23  0740 03/04/23  0513 03/05/23  0509    130* 138   K 4.2 4.0 3.9   CL 97* 92* 99   CO2 30 29 30   BUN 20 20 19   CREATININE 1.2 1.3 1.2   GLUCOSE 315* 508* 185*       Ca/Mg/Phos:   Recent Labs     03/02/23  1633 03/03/23  0740 03/04/23  0513 03/05/23  0509   CALCIUM 8.9 9.2 8.8 9.5   MG  --  1.80 1.90 1.90   PHOS 3.0  --   --   --        Hepatic:   Recent Labs     03/03/23  1102   AST 22   ALT 71*   BILITOT <0.2   ALKPHOS 86     Troponin:   No results for input(s): TROPONINI in the last 72 hours. BNP: No results for input(s): BNP in the last 72 hours. Lipids: No results for input(s): CHOL, TRIG, HDL, LDLCALC, LABVLDL in the last 72 hours. ABGs: No results for input(s): PHART, PO2ART, HOM4CWB in the last 72 hours. INR: No results for input(s): INR in the last 72 hours. UA:  No results for input(s): Janace Miller, GLUCOSEU, BILIRUBINUR, KETUA, SPECGRAV, BLOODU, PHUR, PROTEINU, UROBILINOGEN, NITRU, LEUKOCYTESUR, Kerrie Comment in the last 72 hours.      Urine Microscopic:   No results for input(s): LABCAST, BACTERIA, COMU, HYALCAST, WBCUA, RBCUA, EPIU in the last 72 hours. Urine Culture: No results for input(s): LABURIN in the last 72 hours. Urine Chemistry: No results for input(s): Jarett Clos, PROTEINUR, NAUR in the last 72 hours. Objective:   Vitals: BP (!) 166/109   Pulse 96   Temp 97.4 °F (36.3 °C) (Oral)   Resp 18   Ht 6' 1\" (1.854 m)   Wt (!) 328 lb 7.8 oz (149 kg)   SpO2 96%   BMI 43.34 kg/m²    Wt Readings from Last 3 Encounters:   03/05/23 (!) 328 lb 7.8 oz (149 kg)   02/21/23 (!) 333 lb (151 kg)   02/15/23 (!) 317 lb 0.3 oz (143.8 kg)      24HR INTAKE/OUTPUT:    Intake/Output Summary (Last 24 hours) at 3/5/2023 1046  Last data filed at 3/5/2023 1023  Gross per 24 hour   Intake 3326 ml   Output 5675 ml   Net -2349 ml       Constitutional:  OAA X3 NAD, morbidly obese, very fatigued  Skin: no rash, turgor wnl  Heent:  eomi, mmm, moon facies, facial flushing, buffalo hump  Neck: no bruits or jvd noted  Cardiovascular:  S1, S2 without m/r/g  Respiratory: CTA B without w/r/r  Abdomen:  +bs, soft, nt, nd, increased abdominal girth, no noticeable striae  Ext: + lower extremity edema  Psychiatric: mood and affect appropriate  Musculoskeletal:  Rom, muscular strength intact    Assessment and Plan:       IMAGING:  CT CHEST PULMONARY EMBOLISM W CONTRAST   Final Result      1. No evidence of any pulmonary thromboembolus, aneurysm or dissection. 2. Patchy multifocal airspace disease predominantly in the right lower lobe and left lower lobe with some patchy groundglass minimal upper lobe changes in both lungs, most likely suggestive of atypical pneumonia or viral pneumonia or pneumonitis. Correlate clinically      XR CHEST (2 VW)   Final Result      Interval clearing of airspace disease throughout both lungs since prior exam from February 2023. No acute chest process.           Assessment/Plan   Hypertensive urgency  As evidenced by elevated BP >180/120, no end-organ damage  BP's during last admission ranged 150-170's  BP in recent (2/21/23) cardiology office was 160/120  Possibly 2/2 hypercortisolism     Anemia     Acid- base/ Electrolyte imbalance     Uncontrolled DM with hyperglycemia     Obesity    COVID +     PLAN:    F/u Plasma renin /aldosterone activity    Overall BP control improving with current BP meds , will continue with current regimen    Would not aggressively drop BP's  Better glucose control can aid in decreasing BP's    Continue Hydralazine 100mg TID (recently increased), Carvedilol 50mg BID, spironolactone 50mg qd  --------> increase Aldactone to 100 mg daily , increase Nifedipine to 90 mg/day      Continue Lasix 40mg BID (recently increased)  Nifedipine 30mg qd added starting this AM  Aldosterone level/metanephrines already ordered and pending, will add renin to obtain ratio - pending  Will order timed AM cortisol, Urine free cortisol (will need to be >3x ULN) -pending  Can also perform an overnight dexa suppression test  Will continue to monitor  Labs in Uziel Stacy MD  [facility] [address] [place] [address] [phone]       Plan of care discussed with Resident  Agree with above assessment and plan

## 2023-03-05 NOTE — PROGRESS NOTES
Office : [phone]     Fax :[phone]         Renal Progress Note  Subjective:   Admit Date: 3/1/2023     SUKHI Lay is a 38 y.o. male with prior history of COPD, hypertension, schizoaffective disorder, CHF, diabetes who presents to the ED with complaint of Dizziness, SOB and Hyperglycemia since morning of presentation. Seemed acute in onset with progressive worsening. Had assoc N/V prior to presentation. Pt was recently admitted for CHF exacerbation and followed up with cardiology since discharge. Pt remained hypertensive and his Hydralazine and Lasix doses were increased. He was seen by nephrology outpt who recommended hypercortisolism workup given clinical signs and referral to Endo. Labs were not completed. Pt was not seen by endo yet. Pt states that his presenting symptoms have improved. He endorses facial plethora for more than several weeks. He also endorses LE edema, and being unable to control his blood sugars. Interval History:     BP remains high  Not in acute distress  No vomiting  No chest pain or headaches or nausea today      DIET ADULT DIET; Regular; 3 carb choices (45 gm/meal);  Low Sodium (2 gm)  Medications:   Scheduled Meds:   hydroCHLOROthiazide  25 mg Oral Daily    minoxidil  5 mg Oral Daily    sennosides-docusate sodium  2 tablet Oral Daily    insulin glargine  50 Units SubCUTAneous BID    And    insulin glargine  50 Units SubCUTAneous BID    insulin lispro  27 Units SubCUTAneous TID     spironolactone  100 mg Oral Daily    NIFEdipine  90 mg Oral Daily    albuterol-ipratropium  1 puff Inhalation 4x daily polyethylene glycol  17 g Oral Daily    sodium chloride flush  5-40 mL IntraVENous 2 times per day    enoxaparin  40 mg SubCUTAneous BID    insulin lispro  0-16 Units SubCUTAneous TID     insulin lispro  0-4 Units SubCUTAneous Nightly    atorvastatin  80 mg Oral Nightly    haloperidol  20 mg Oral TID    benztropine  1 mg Oral BID    lidocaine  1 patch TransDERmal Daily    hydrALAZINE  100 mg Oral TID    aspirin  81 mg Oral Daily    hydrOXYzine HCl  25 mg Oral TID    senna  1 tablet Oral BID    pantoprazole  40 mg Oral BID AC    divalproex  500 mg Oral TID    fenofibrate  160 mg Oral Daily    carvedilol  50 mg Oral BID WC    nicotine  1 patch TransDERmal Daily    QUEtiapine  200 mg Oral Q6H     Continuous Infusions:   sodium chloride      dextrose         Labs:  CBC:   Recent Labs     03/03/23  0740 03/04/23  0512 03/05/23  0509   WBC 6.5 5.1 5.9   HGB 12.6* 12.3* 12.9*    177 179       BMP:    Recent Labs     03/03/23  0740 03/04/23  0513 03/05/23  0509    130* 138   K 4.2 4.0 3.9   CL 97* 92* 99   CO2 30 29 30   BUN 20 20 19   CREATININE 1.2 1.3 1.2   GLUCOSE 315* 508* 185*       Ca/Mg/Phos:   Recent Labs     03/02/23  1633 03/03/23  0740 03/04/23  0513 03/05/23  0509   CALCIUM 8.9 9.2 8.8 9.5   MG  --  1.80 1.90 1.90   PHOS 3.0  --   --   --        Hepatic:   Recent Labs     03/03/23  1102   AST 22   ALT 71*   BILITOT <0.2   ALKPHOS 86       Troponin:   No results for input(s): TROPONINI in the last 72 hours. BNP: No results for input(s): BNP in the last 72 hours. Lipids: No results for input(s): CHOL, TRIG, HDL, LDLCALC, LABVLDL in the last 72 hours. ABGs: No results for input(s): PHART, PO2ART, AYC4JMF in the last 72 hours. INR: No results for input(s): INR in the last 72 hours. UA:  No results for input(s): Delwin Rinaon, GLUCOSEU, BILIRUBINUR, KETUA, SPECGRAV, BLOODU, PHUR, PROTEINU, UROBILINOGEN, NITRU, LEUKOCYTESUR, Baylee Corti in the last 72 hours.      Urine Microscopic:   No results for input(s): LABCAST, BACTERIA, COMU, HYALCAST, WBCUA, RBCUA, EPIU in the last 72 hours. Urine Culture: No results for input(s): LABURIN in the last 72 hours. Urine Chemistry: No results for input(s): [place] Meter, PROTEINUR, NAUR in the last 72 hours. Objective:   Vitals: BP (!) 166/109   Pulse 96   Temp 97.4 °F (36.3 °C) (Oral)   Resp 18   Ht 6' 1\" (1.854 m)   Wt (!) 328 lb 7.8 oz (149 kg)   SpO2 96%   BMI 43.34 kg/m²    Wt Readings from Last 3 Encounters:   03/05/23 (!) 328 lb 7.8 oz (149 kg)   02/21/23 (!) 333 lb (151 kg)   02/15/23 (!) 317 lb 0.3 oz (143.8 kg)      24HR INTAKE/OUTPUT:    Intake/Output Summary (Last 24 hours) at 3/5/2023 1049  Last data filed at 3/5/2023 1023  Gross per 24 hour   Intake 3326 ml   Output 5675 ml   Net -2349 ml       Constitutional:  OAA X3 NAD, morbidly obese, very fatigued  Skin: no rash, turgor wnl  Heent:  eomi, mmm, moon facies, facial flushing, buffalo hump  Neck: no bruits or jvd noted  Cardiovascular:  S1, S2 without m/r/g  Respiratory: CTA B without w/r/r  Abdomen:  +bs, soft, nt, nd, increased abdominal girth, no noticeable striae  Ext: + lower extremity edema  Psychiatric: mood and affect appropriate  Musculoskeletal:  Rom, muscular strength intact    Assessment and Plan:       IMAGING:  CT CHEST PULMONARY EMBOLISM W CONTRAST   Final Result      1. No evidence of any pulmonary thromboembolus, aneurysm or dissection. 2. Patchy multifocal airspace disease predominantly in the right lower lobe and left lower lobe with some patchy groundglass minimal upper lobe changes in both lungs, most likely suggestive of atypical pneumonia or viral pneumonia or pneumonitis. Correlate clinically      XR CHEST (2 VW)   Final Result      Interval clearing of airspace disease throughout both lungs since prior exam from February 2023. No acute chest process.           Assessment/Plan   Hypertensive urgency  As evidenced by elevated BP >180/120, no end-organ damage  BP's during last admission ranged 150-170's  BP in recent (2/21/23) cardiology office was 160/120  Possibly 2/2 hypercortisolism     Anemia     Acid- base/ Electrolyte imbalance     Uncontrolled DM with hyperglycemia     Obesity    COVID +     PLAN:    Get BL renal artery doppler     F/u Plasma renin /aldosterone activity    Overall BP control improving with current BP meds , will continue with current regimen    Would not aggressively drop BP's  Better glucose control can aid in decreasing BP's    Continue Hydralazine 100mg TID (recently increased), Carvedilol 50mg BID, spironolactone 50mg qd  --------> increase Aldactone to 100 mg daily , increase Nifedipine to 90 mg/day  + add Minoxidil 5 mg/d      Continue Lasix 40mg BID (recently increased)  Nifedipine 30mg qd added starting this AM  Aldosterone level/metanephrines already ordered and pending, will add renin to obtain ratio - pending  Will order timed AM cortisol, Urine free cortisol (will need to be >3x ULN) -pending  Can also perform an overnight dexa suppression test  Will continue to monitor  Labs in Connor Gilmore MD  [phone]  [phone]       Plan of care discussed with Resident  Agree with above assessment and plan

## 2023-03-05 NOTE — PROGRESS NOTES
It is very challenging for this patient to be compliant with a proper diet to manage his diabetes. I've worked diligently to educate the patient on dietary restrictions. However he is obsessed with food and drinks and does not seem to fully grasp the consequences of high carb snack foods and sugary beverages. He asks for food and drinks every time any staff member enters his room. I am constantly re-educating him and making compromises and food/drink choices.

## 2023-03-05 NOTE — PLAN OF CARE
Problem: Cardiovascular - Adult  Goal: Maintains optimal cardiac output and hemodynamic stability  3/5/2023 1027 by Nupur Maguire RN  Outcome: Progressing  Flowsheets (Taken 3/2/2023 2031 by Bela Alvarado RN)  Maintains optimal cardiac output and hemodynamic stability:   Monitor blood pressure and heart rate   Monitor urine output and notify Licensed Independent Practitioner for values outside of normal range   Assess for signs of decreased cardiac output  3/5/2023 0114 by Marleen Carney RN  Outcome: Not Progressing  Note: Pt blood pressure remains high and is requiring PRN BP med administration in addition to scheduled meds to manage BP within normal limits. Problem: Chronic Conditions and Co-morbidities  Goal: Patient's chronic conditions and co-morbidity symptoms are monitored and maintained or improved  3/5/2023 1027 by Nupur Maguire RN  Outcome: Progressing  Flowsheets (Taken 3/4/2023 1146)  Care Plan - Patient's Chronic Conditions and Co-Morbidity Symptoms are Monitored and Maintained or Improved: Monitor and assess patient's chronic conditions and comorbid symptoms for stability, deterioration, or improvement  3/5/2023 0114 by Marleen Carney RN  Outcome: Not Progressing  Flowsheets (Taken 3/4/2023 1146 by Nupur Maguire RN)  Care Plan - Patient's Chronic Conditions and Co-Morbidity Symptoms are Monitored and Maintained or Improved: Monitor and assess patient's chronic conditions and comorbid symptoms for stability, deterioration, or improvement  Note: It is very challenging to manage the patients blood sugars d/t barriers to effective education on diabetes control. Pt wants to eat all the time and does not comprehend the need for certain dietary restrictions.       Problem: Metabolic/Fluid and Electrolytes - Adult  Goal: Glucose maintained within prescribed range  3/5/2023 1027 by Nupur Maguire RN  Outcome: Progressing  Flowsheets (Taken 3/4/2023 0049 by Pamela Mcintosh RN)  Glucose maintained within prescribed range: Monitor blood glucose as ordered   Assess for signs and symptoms of hyperglycemia and hypoglycemia   Administer ordered medications to maintain glucose within target range   Assess barriers to adequate nutritional intake and initiate nutrition consult as needed  3/5/2023 0114 by Lindsay Bateman, RN  Outcome: Not Progressing  Flowsheets (Taken 3/4/2023 0049 by Maile Apley, MASOUD)  Glucose maintained within prescribed range:   Monitor blood glucose as ordered   Assess for signs and symptoms of hyperglycemia and hypoglycemia   Administer ordered medications to maintain glucose within target range   Assess barriers to adequate nutritional intake and initiate nutrition consult as needed  Note: Continue to educate patient on diabetes treatments and dietary restrictions.

## 2023-03-05 NOTE — PLAN OF CARE
Problem: Cardiovascular - Adult  Goal: Maintains optimal cardiac output and hemodynamic stability  3/5/2023 0114 by Chon Delgado RN  Outcome: Not Progressing  Note: Pt blood pressure remains high and is requiring PRN BP med administration in addition to scheduled meds to manage BP within normal limits. Problem: Chronic Conditions and Co-morbidities  Goal: Patient's chronic conditions and co-morbidity symptoms are monitored and maintained or improved  3/5/2023 0114 by Chon Delgado RN  Outcome: Not Progressing  Flowsheets (Taken 3/4/2023 1146 by Kierra Crocker RN)  Care Plan - Patient's Chronic Conditions and Co-Morbidity Symptoms are Monitored and Maintained or Improved: Monitor and assess patient's chronic conditions and comorbid symptoms for stability, deterioration, or improvement  Note: It is very challenging to manage the patients blood sugars d/t barriers to effective education on diabetes control. Pt wants to eat all the time and does not comprehend the need for certain dietary restrictions. Problem: Metabolic/Fluid and Electrolytes - Adult  Goal: Glucose maintained within prescribed range  3/5/2023 0114 by Chon Delgado RN  Outcome: Not Progressing  Flowsheets (Taken 3/4/2023 0049 by Najma Colindres RN)  Glucose maintained within prescribed range:   Monitor blood glucose as ordered   Assess for signs and symptoms of hyperglycemia and hypoglycemia   Administer ordered medications to maintain glucose within target range   Assess barriers to adequate nutritional intake and initiate nutrition consult as needed  Note: Continue to educate patient on diabetes treatments and dietary restrictions.       Problem: Discharge Planning  Goal: Discharge to home or other facility with appropriate resources  3/5/2023 0114 by Chon Delgado RN  Outcome: Perla Perazas (Taken 3/2/2023 2031 by Ira Handy RN)  Discharge to home or other facility with appropriate resources:   Identify barriers to discharge with patient and caregiver   Identify discharge learning needs (meds, wound care, etc)     Problem: ABCDS Injury Assessment  Goal: Absence of physical injury  3/5/2023 0114 by Dagoberto Beltran RN  Outcome: Progressing  Flowsheets (Taken 3/2/2023 1038 by Reyes Rose, RN)  Absence of Physical Injury: Implement safety measures based on patient assessment     Problem: Safety - Adult  Goal: Free from fall injury  3/5/2023 0114 by Dagoberto Beltran RN  Outcome: Progressing  Flowsheets (Taken 3/4/2023 0048 by Kieran Elizabeth RN)  Free From Fall Injury: Instruct family/caregiver on patient safety     Problem: Pain  Goal: Verbalizes/displays adequate comfort level or baseline comfort level  3/5/2023 0114 by Dagoberto Beltran RN  Outcome: Progressing  Flowsheets (Taken 3/4/2023 1146 by Emir Camarillo RN)  Verbalizes/displays adequate comfort level or baseline comfort level:   Encourage patient to monitor pain and request assistance   Assess pain using appropriate pain scale     Problem: Cardiovascular - Adult  Goal: Absence of cardiac dysrhythmias or at baseline  3/5/2023 0114 by Dagoberto Beltran RN  Outcome: Progressing  Flowsheets (Taken 3/4/2023 1146 by Emir Camarillo RN)  Absence of cardiac dysrhythmias or at baseline: Monitor cardiac rate and rhythm     Problem: Respiratory - Adult  Goal: Achieves optimal ventilation and oxygenation  3/5/2023 0114 by Dagoberto Beltran RN  Outcome: Progressing  Flowsheets (Taken 3/4/2023 0049 by Kieran Elizabeth RN)  Achieves optimal ventilation and oxygenation:   Assess for changes in respiratory status   Assess for changes in mentation and behavior   Position to facilitate oxygenation and minimize respiratory effort   Oxygen supplementation based on oxygen saturation or arterial blood gases  Note: Asymptomatic.  WNL     Problem: Metabolic/Fluid and Electrolytes - Adult  Goal: Electrolytes maintained within normal limits  3/5/2023 0114 by Dagoberto Beltran RN  Outcome: Progressing  Flowsheets (Taken 3/4/2023 1146 by Tiana Kelly RN)  Electrolytes maintained within normal limits: Monitor labs and assess patient for signs and symptoms of electrolyte imbalances     Problem: Metabolic/Fluid and Electrolytes - Adult  Goal: Hemodynamic stability and optimal renal function maintained  3/5/2023 0114 by Dmitry Villela RN  Outcome: Progressing  Flowsheets (Taken 3/4/2023 1146 by Tiana Kelly RN)  Hemodynamic stability and optimal renal function maintained:   Monitor labs and assess for signs and symptoms of volume excess or deficit   Monitor intake, output and patient weight     Problem: Cardiovascular - Adult  Goal: Maintains optimal cardiac output and hemodynamic stability  3/5/2023 0114 by Dmitry Villela RN  Outcome: Not Progressing  Note: Pt blood pressure remains high and is requiring PRN BP med administration in addition to scheduled meds to manage BP within normal limits. 3/4/2023 1146 by Tiana Kelly RN  Outcome: Progressing  Flowsheets (Taken 3/2/2023 2031 by Chapis Lockhart RN)  Maintains optimal cardiac output and hemodynamic stability:   Monitor blood pressure and heart rate   Monitor urine output and notify Licensed Independent Practitioner for values outside of normal range   Assess for signs of decreased cardiac output     Problem: Chronic Conditions and Co-morbidities  Goal: Patient's chronic conditions and co-morbidity symptoms are monitored and maintained or improved  3/5/2023 0114 by Dmitry Villela RN  Outcome: Not Progressing  Flowsheets (Taken 3/4/2023 1146 by Tiana Kelly RN)  Care Plan - Patient's Chronic Conditions and Co-Morbidity Symptoms are Monitored and Maintained or Improved: Monitor and assess patient's chronic conditions and comorbid symptoms for stability, deterioration, or improvement  Note: It is very challenging to manage the patients blood sugars d/t barriers to effective education on diabetes control.  Pt wants to eat all the time and does not comprehend the need for certain dietary restrictions. 3/4/2023 1146 by Dale Bruno RN  Outcome: Progressing  Flowsheets (Taken 3/4/2023 1146)  Care Plan - Patient's Chronic Conditions and Co-Morbidity Symptoms are Monitored and Maintained or Improved: Monitor and assess patient's chronic conditions and comorbid symptoms for stability, deterioration, or improvement     Problem: Metabolic/Fluid and Electrolytes - Adult  Goal: Glucose maintained within prescribed range  3/5/2023 0114 by Alma Rosa Farrar RN  Outcome: Not Progressing  Flowsheets (Taken 3/4/2023 0049 by Yuan Lou RN)  Glucose maintained within prescribed range:   Monitor blood glucose as ordered   Assess for signs and symptoms of hyperglycemia and hypoglycemia   Administer ordered medications to maintain glucose within target range   Assess barriers to adequate nutritional intake and initiate nutrition consult as needed  Note: Continue to educate patient on diabetes treatments and dietary restrictions.    3/4/2023 1146 by Dale Bruno RN  Outcome: Progressing  Flowsheets (Taken 3/4/2023 0049 by Yuan Lou, RN)  Glucose maintained within prescribed range:   Monitor blood glucose as ordered   Assess for signs and symptoms of hyperglycemia and hypoglycemia   Administer ordered medications to maintain glucose within target range   Assess barriers to adequate nutritional intake and initiate nutrition consult as needed

## 2023-03-06 VITALS
RESPIRATION RATE: 23 BRPM | BODY MASS INDEX: 41.75 KG/M2 | HEIGHT: 73 IN | SYSTOLIC BLOOD PRESSURE: 160 MMHG | WEIGHT: 315 LBS | OXYGEN SATURATION: 94 % | TEMPERATURE: 97.7 F | HEART RATE: 96 BPM | DIASTOLIC BLOOD PRESSURE: 117 MMHG

## 2023-03-06 LAB
ADRENOCORTICOTROPIC HORMONE: 30 PG/ML (ref 7–69)
ANION GAP SERPL CALCULATED.3IONS-SCNC: 12 MMOL/L (ref 3–16)
BUN BLDV-MCNC: 25 MG/DL (ref 7–20)
CALCIUM SERPL-MCNC: 9.6 MG/DL (ref 8.3–10.6)
CHLORIDE BLD-SCNC: 95 MMOL/L (ref 99–110)
CO2: 28 MMOL/L (ref 21–32)
CREAT SERPL-MCNC: 1.3 MG/DL (ref 0.9–1.3)
EKG ATRIAL RATE: 90 BPM
EKG ATRIAL RATE: 91 BPM
EKG DIAGNOSIS: NORMAL
EKG DIAGNOSIS: NORMAL
EKG P AXIS: 59 DEGREES
EKG P AXIS: 65 DEGREES
EKG P-R INTERVAL: 140 MS
EKG P-R INTERVAL: 142 MS
EKG Q-T INTERVAL: 382 MS
EKG Q-T INTERVAL: 386 MS
EKG QRS DURATION: 88 MS
EKG QRS DURATION: 94 MS
EKG QTC CALCULATION (BAZETT): 469 MS
EKG QTC CALCULATION (BAZETT): 472 MS
EKG R AXIS: -1 DEGREES
EKG R AXIS: -22 DEGREES
EKG T AXIS: 114 DEGREES
EKG T AXIS: 120 DEGREES
EKG VENTRICULAR RATE: 90 BPM
EKG VENTRICULAR RATE: 91 BPM
GFR SERPL CREATININE-BSD FRML MDRD: >60 ML/MIN/{1.73_M2}
GLUCOSE BLD-MCNC: 213 MG/DL (ref 70–99)
GLUCOSE BLD-MCNC: 223 MG/DL (ref 70–99)
GLUCOSE BLD-MCNC: 246 MG/DL (ref 70–99)
GLUCOSE BLD-MCNC: 282 MG/DL (ref 70–99)
GLUCOSE BLD-MCNC: 283 MG/DL (ref 70–99)
HCT VFR BLD CALC: 40 % (ref 40.5–52.5)
HEMOGLOBIN: 12.9 G/DL (ref 13.5–17.5)
MAGNESIUM: 1.8 MG/DL (ref 1.8–2.4)
MCH RBC QN AUTO: 26.6 PG (ref 26–34)
MCHC RBC AUTO-ENTMCNC: 32.2 G/DL (ref 31–36)
MCV RBC AUTO: 82.5 FL (ref 80–100)
PDW BLD-RTO: 15.4 % (ref 12.4–15.4)
PERFORMED ON: ABNORMAL
PLATELET # BLD: 190 K/UL (ref 135–450)
PMV BLD AUTO: 10.2 FL (ref 5–10.5)
POTASSIUM SERPL-SCNC: 4.9 MMOL/L (ref 3.5–5.1)
PRO-BNP: 382 PG/ML (ref 0–124)
RBC # BLD: 4.85 M/UL (ref 4.2–5.9)
SODIUM BLD-SCNC: 135 MMOL/L (ref 136–145)
WBC # BLD: 6.2 K/UL (ref 4–11)

## 2023-03-06 PROCEDURE — 85027 COMPLETE CBC AUTOMATED: CPT

## 2023-03-06 PROCEDURE — 2580000003 HC RX 258: Performed by: STUDENT IN AN ORGANIZED HEALTH CARE EDUCATION/TRAINING PROGRAM

## 2023-03-06 PROCEDURE — 6360000002 HC RX W HCPCS

## 2023-03-06 PROCEDURE — 2500000003 HC RX 250 WO HCPCS: Performed by: INTERNAL MEDICINE

## 2023-03-06 PROCEDURE — 6370000000 HC RX 637 (ALT 250 FOR IP): Performed by: NURSE PRACTITIONER

## 2023-03-06 PROCEDURE — 93010 ELECTROCARDIOGRAM REPORT: CPT | Performed by: INTERNAL MEDICINE

## 2023-03-06 PROCEDURE — 83880 ASSAY OF NATRIURETIC PEPTIDE: CPT

## 2023-03-06 PROCEDURE — 83735 ASSAY OF MAGNESIUM: CPT

## 2023-03-06 PROCEDURE — 6370000000 HC RX 637 (ALT 250 FOR IP)

## 2023-03-06 PROCEDURE — 6360000002 HC RX W HCPCS: Performed by: STUDENT IN AN ORGANIZED HEALTH CARE EDUCATION/TRAINING PROGRAM

## 2023-03-06 PROCEDURE — 6370000000 HC RX 637 (ALT 250 FOR IP): Performed by: INTERNAL MEDICINE

## 2023-03-06 PROCEDURE — 99232 SBSQ HOSP IP/OBS MODERATE 35: CPT | Performed by: HOSPITALIST

## 2023-03-06 PROCEDURE — 6370000000 HC RX 637 (ALT 250 FOR IP): Performed by: STUDENT IN AN ORGANIZED HEALTH CARE EDUCATION/TRAINING PROGRAM

## 2023-03-06 PROCEDURE — 6370000000 HC RX 637 (ALT 250 FOR IP): Performed by: HOSPITALIST

## 2023-03-06 PROCEDURE — 80048 BASIC METABOLIC PNL TOTAL CA: CPT

## 2023-03-06 PROCEDURE — 36415 COLL VENOUS BLD VENIPUNCTURE: CPT

## 2023-03-06 PROCEDURE — 93005 ELECTROCARDIOGRAM TRACING: CPT | Performed by: NURSE PRACTITIONER

## 2023-03-06 RX ORDER — NIFEDIPINE 30 MG/1
90 TABLET, FILM COATED, EXTENDED RELEASE ORAL DAILY
Qty: 30 TABLET | Refills: 3 | Status: SHIPPED | OUTPATIENT
Start: 2023-03-06 | End: 2023-03-06 | Stop reason: SDUPTHER

## 2023-03-06 RX ORDER — INSULIN LISPRO 100 [IU]/ML
27 INJECTION, SOLUTION INTRAVENOUS; SUBCUTANEOUS
Qty: 24.3 ML | Refills: 0 | Status: SHIPPED | OUTPATIENT
Start: 2023-03-06 | End: 2023-04-05

## 2023-03-06 RX ORDER — OMEGA-3-ACID ETHYL ESTERS 1 G/1
2 CAPSULE, LIQUID FILLED ORAL 2 TIMES DAILY
Qty: 60 CAPSULE | Refills: 3 | Status: SHIPPED | OUTPATIENT
Start: 2023-03-06

## 2023-03-06 RX ORDER — HYDROCHLOROTHIAZIDE 25 MG/1
25 TABLET ORAL DAILY
Qty: 30 TABLET | Refills: 3 | Status: SHIPPED | OUTPATIENT
Start: 2023-03-06

## 2023-03-06 RX ORDER — NIFEDIPINE 30 MG/1
90 TABLET, FILM COATED, EXTENDED RELEASE ORAL DAILY
Qty: 30 TABLET | Refills: 3 | Status: SHIPPED | OUTPATIENT
Start: 2023-03-06

## 2023-03-06 RX ORDER — CARVEDILOL 25 MG/1
50 TABLET ORAL 2 TIMES DAILY WITH MEALS
Qty: 60 TABLET | Refills: 3 | Status: SHIPPED | OUTPATIENT
Start: 2023-03-06

## 2023-03-06 RX ORDER — EPLERENONE 25 MG/1
25 TABLET, FILM COATED ORAL 2 TIMES DAILY
Qty: 30 TABLET | Refills: 3 | Status: SHIPPED | OUTPATIENT
Start: 2023-03-06

## 2023-03-06 RX ORDER — MAGNESIUM SULFATE IN WATER 40 MG/ML
2000 INJECTION, SOLUTION INTRAVENOUS ONCE
Status: COMPLETED | OUTPATIENT
Start: 2023-03-06 | End: 2023-03-06

## 2023-03-06 RX ORDER — INSULIN LISPRO 100 [IU]/ML
0-4 INJECTION, SOLUTION INTRAVENOUS; SUBCUTANEOUS NIGHTLY
Qty: 1.2 ML | Refills: 0 | Status: SHIPPED | OUTPATIENT
Start: 2023-03-06 | End: 2023-04-05

## 2023-03-06 RX ORDER — HYDRALAZINE HYDROCHLORIDE 100 MG/1
100 TABLET, FILM COATED ORAL 3 TIMES DAILY
Qty: 60 TABLET | Refills: 3 | Status: SHIPPED | OUTPATIENT
Start: 2023-03-06

## 2023-03-06 RX ORDER — INSULIN LISPRO 100 [IU]/ML
0-16 INJECTION, SOLUTION INTRAVENOUS; SUBCUTANEOUS
Qty: 14.4 ML | Refills: 0 | Status: SHIPPED | OUTPATIENT
Start: 2023-03-06 | End: 2023-04-05

## 2023-03-06 RX ADMIN — ACETAMINOPHEN 1000 MG: 500 TABLET ORAL at 04:57

## 2023-03-06 RX ADMIN — DIVALPROEX SODIUM 500 MG: 125 CAPSULE, COATED PELLETS ORAL at 09:16

## 2023-03-06 RX ADMIN — CARVEDILOL 50 MG: 25 TABLET, FILM COATED ORAL at 18:32

## 2023-03-06 RX ADMIN — INSULIN LISPRO 27 UNITS: 100 INJECTION, SOLUTION INTRAVENOUS; SUBCUTANEOUS at 12:42

## 2023-03-06 RX ADMIN — HYDRALAZINE HYDROCHLORIDE 100 MG: 100 TABLET, FILM COATED ORAL at 09:17

## 2023-03-06 RX ADMIN — POLYETHYLENE GLYCOL 3350 17 G: 17 POWDER, FOR SOLUTION ORAL at 09:20

## 2023-03-06 RX ADMIN — CARVEDILOL 50 MG: 25 TABLET, FILM COATED ORAL at 11:27

## 2023-03-06 RX ADMIN — INSULIN LISPRO 8 UNITS: 100 INJECTION, SOLUTION INTRAVENOUS; SUBCUTANEOUS at 12:41

## 2023-03-06 RX ADMIN — DIVALPROEX SODIUM 500 MG: 125 CAPSULE, COATED PELLETS ORAL at 15:00

## 2023-03-06 RX ADMIN — INSULIN LISPRO 27 UNITS: 100 INJECTION, SOLUTION INTRAVENOUS; SUBCUTANEOUS at 18:34

## 2023-03-06 RX ADMIN — PANTOPRAZOLE SODIUM 40 MG: 40 TABLET, DELAYED RELEASE ORAL at 16:10

## 2023-03-06 RX ADMIN — OMEGA-3-ACID ETHYL ESTERS 2 G: 1 CAPSULE, LIQUID FILLED ORAL at 09:19

## 2023-03-06 RX ADMIN — IPRATROPIUM BROMIDE AND ALBUTEROL 1 PUFF: 20; 100 SPRAY, METERED RESPIRATORY (INHALATION) at 12:53

## 2023-03-06 RX ADMIN — EPLERENONE 25 MG: 25 TABLET, FILM COATED ORAL at 12:37

## 2023-03-06 RX ADMIN — INSULIN LISPRO 8 UNITS: 100 INJECTION, SOLUTION INTRAVENOUS; SUBCUTANEOUS at 18:34

## 2023-03-06 RX ADMIN — LABETALOL HYDROCHLORIDE 10 MG: 5 INJECTION, SOLUTION INTRAVENOUS at 04:56

## 2023-03-06 RX ADMIN — BENZTROPINE MESYLATE 1 MG: 1 TABLET ORAL at 09:16

## 2023-03-06 RX ADMIN — QUETIAPINE FUMARATE 200 MG: 200 TABLET ORAL at 04:57

## 2023-03-06 RX ADMIN — SENNOSIDES AND DOCUSATE SODIUM 2 TABLET: 50; 8.6 TABLET ORAL at 12:37

## 2023-03-06 RX ADMIN — PANTOPRAZOLE SODIUM 40 MG: 40 TABLET, DELAYED RELEASE ORAL at 06:22

## 2023-03-06 RX ADMIN — HALOPERIDOL 20 MG: 5 TABLET ORAL at 16:31

## 2023-03-06 RX ADMIN — INSULIN LISPRO 4 UNITS: 100 INJECTION, SOLUTION INTRAVENOUS; SUBCUTANEOUS at 09:30

## 2023-03-06 RX ADMIN — HALOPERIDOL 20 MG: 5 TABLET ORAL at 09:17

## 2023-03-06 RX ADMIN — INSULIN GLARGINE 50 UNITS: 100 INJECTION, SOLUTION SUBCUTANEOUS at 09:30

## 2023-03-06 RX ADMIN — ASPIRIN 81 MG 81 MG: 81 TABLET ORAL at 09:15

## 2023-03-06 RX ADMIN — HYDROXYZINE HYDROCHLORIDE 25 MG: 25 TABLET ORAL at 09:16

## 2023-03-06 RX ADMIN — NIFEDIPINE 90 MG: 30 TABLET, EXTENDED RELEASE ORAL at 09:16

## 2023-03-06 RX ADMIN — SODIUM CHLORIDE, PRESERVATIVE FREE 10 ML: 5 INJECTION INTRAVENOUS at 09:37

## 2023-03-06 RX ADMIN — IPRATROPIUM BROMIDE AND ALBUTEROL 1 PUFF: 20; 100 SPRAY, METERED RESPIRATORY (INHALATION) at 08:49

## 2023-03-06 RX ADMIN — INSULIN LISPRO 27 UNITS: 100 INJECTION, SOLUTION INTRAVENOUS; SUBCUTANEOUS at 09:31

## 2023-03-06 RX ADMIN — MAGNESIUM SULFATE HEPTAHYDRATE 2000 MG: 40 INJECTION, SOLUTION INTRAVENOUS at 09:37

## 2023-03-06 RX ADMIN — HYDROXYZINE HYDROCHLORIDE 25 MG: 25 TABLET ORAL at 16:10

## 2023-03-06 RX ADMIN — QUETIAPINE FUMARATE 200 MG: 200 TABLET ORAL at 11:28

## 2023-03-06 RX ADMIN — ENOXAPARIN SODIUM 40 MG: 100 INJECTION SUBCUTANEOUS at 06:22

## 2023-03-06 RX ADMIN — FENOFIBRATE 160 MG: 160 TABLET, FILM COATED ORAL at 09:16

## 2023-03-06 RX ADMIN — QUETIAPINE FUMARATE 200 MG: 200 TABLET ORAL at 18:32

## 2023-03-06 RX ADMIN — SENNOSIDES 8.6 MG: 8.6 TABLET, COATED ORAL at 09:20

## 2023-03-06 ASSESSMENT — PAIN SCALES - GENERAL
PAINLEVEL_OUTOF10: 0
PAINLEVEL_OUTOF10: 3

## 2023-03-06 ASSESSMENT — PAIN DESCRIPTION - ORIENTATION: ORIENTATION: POSTERIOR

## 2023-03-06 ASSESSMENT — PAIN DESCRIPTION - ONSET: ONSET: GRADUAL

## 2023-03-06 ASSESSMENT — PAIN DESCRIPTION - DESCRIPTORS: DESCRIPTORS: ACHING

## 2023-03-06 ASSESSMENT — PAIN DESCRIPTION - FREQUENCY: FREQUENCY: INTERMITTENT

## 2023-03-06 ASSESSMENT — PAIN - FUNCTIONAL ASSESSMENT: PAIN_FUNCTIONAL_ASSESSMENT: ACTIVITIES ARE NOT PREVENTED

## 2023-03-06 ASSESSMENT — PAIN DESCRIPTION - LOCATION: LOCATION: HEAD

## 2023-03-06 ASSESSMENT — PAIN DESCRIPTION - PAIN TYPE: TYPE: ACUTE PAIN

## 2023-03-06 NOTE — PLAN OF CARE
General Internal Medicine Attending     Chart and data reviewed. Patient seen and examined, case discussed with medical resident. Physical exam repeated. Labs and imaging studies reviewed. 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 - Complicating assessment and treatment. Placing patient at risk for multiple co-morbidities as well as early death and contributing to the patient's presentation.  on weight loss            Pxt not needing oxygen at this time. Monitor for O2 requirement to guide further COVID mgt. BP on admission was 202/135;  Pxt has had hyperglycemia, difficult to control in spite of high doses of insulin; He is morbidly obese and has striae all suggestive of hypercortisolism       CT Adrenals: 11/19/2022 with diffuse nodular adrenal hyperplasia      Limited Hormone studies which were done in the past: reviewed     Would evaluate for hyperfunctioning adrenals, but appears has had previous w/u, which even though not exhaustive, will prefer deferring further to Endocrinology. Consult endocrinology. If pxt o/w improved and can be discharged, we will discharge to o/p follow up, branden if we cannot get in pxt eval      Has been placed on spironolactone (may be on, although he has gynecomastia); or Inspra although more expensive.  Optimize BP control     I worry that too may new meds suddenly will precipitate acute drop in BP branden with initiation of minoxidil while already on  hydralazine, will decrease the dose of hydralazine; possibly wean off and add hold parameters. We will discuss with Nephrology. Monitor fluid status closely to decide further diuretic regimens        Rest as per resident's note        Disposition: Remain in pxt pending progress.    Possible DC within 1-2 days;  pxt is [address] [facility] resident at [facility]        Veronica Johnson MD

## 2023-03-06 NOTE — CARE COORDINATION
CM following. Pt to discharge back to [facility] [employer] at 6:30pm. Dina Toro, and legal Guardian notified. AVS faxed to facility and legal guardian. IMM consent over the phone from legal guardian.      Zeny Lepe RN, BSN, [address]  Case Management Department  [phone]

## 2023-03-06 NOTE — PLAN OF CARE
Problem: Discharge Planning  Goal: Discharge to home or other facility with appropriate resources  Outcome: Progressing  Flowsheets (Taken 3/2/2023 2031 by Meena Mayer, RN)  Discharge to home or other facility with appropriate resources:   Identify barriers to discharge with patient and caregiver   Identify discharge learning needs (meds, wound care, etc)     Problem: ABCDS Injury Assessment  Goal: Absence of physical injury  Outcome: Progressing  Flowsheets (Taken 3/2/2023 1038 by Norah Holm RN)  Absence of Physical Injury: Implement safety measures based on patient assessment     Problem: Safety - Adult  Goal: Free from fall injury  Outcome: Progressing  Flowsheets (Taken 3/4/2023 0048 by Ishmael Hernandez RN)  Free From Fall Injury: Instruct family/caregiver on patient safety     Problem: Pain  Goal: Verbalizes/displays adequate comfort level or baseline comfort level  Outcome: Progressing  Flowsheets (Taken 3/4/2023 1146 by Brianne Willoughby RN)  Verbalizes/displays adequate comfort level or baseline comfort level:   Encourage patient to monitor pain and request assistance   Assess pain using appropriate pain scale     Problem: Cardiovascular - Adult  Goal: Maintains optimal cardiac output and hemodynamic stability  Outcome: Progressing  Flowsheets (Taken 3/2/2023 2031 by Meena Mayer RN)  Maintains optimal cardiac output and hemodynamic stability:   Monitor blood pressure and heart rate   Monitor urine output and notify Licensed Independent Practitioner for values outside of normal range   Assess for signs of decreased cardiac output     Problem: Chronic Conditions and Co-morbidities  Goal: Patient's chronic conditions and co-morbidity symptoms are monitored and maintained or improved  Outcome: Progressing  Flowsheets (Taken 3/4/2023 1146 by Brianne Willoughby RN)  Care Plan - Patient's Chronic Conditions and Co-Morbidity Symptoms are Monitored and Maintained or Improved: Monitor and assess patient's chronic conditions and comorbid symptoms for stability, deterioration, or improvement     Problem: Respiratory - Adult  Goal: Achieves optimal ventilation and oxygenation  Outcome: Progressing  Flowsheets (Taken 3/4/2023 0049 by Justin Benoit, RN)  Achieves optimal ventilation and oxygenation:   Assess for changes in respiratory status   Assess for changes in mentation and behavior   Position to facilitate oxygenation and minimize respiratory effort   Oxygen supplementation based on oxygen saturation or arterial blood gases     Problem: Metabolic/Fluid and Electrolytes - Adult  Goal: Electrolytes maintained within normal limits  Outcome: Progressing  Flowsheets (Taken 3/4/2023 1146 by Terri Camilo RN)  Electrolytes maintained within normal limits: Monitor labs and assess patient for signs and symptoms of electrolyte imbalances     Problem: Metabolic/Fluid and Electrolytes - Adult  Goal: Glucose maintained within prescribed range  Outcome: Progressing  Flowsheets (Taken 3/4/2023 0049 by Justin Benoit, RN)  Glucose maintained within prescribed range:   Monitor blood glucose as ordered   Assess for signs and symptoms of hyperglycemia and hypoglycemia   Administer ordered medications to maintain glucose within target range   Assess barriers to adequate nutritional intake and initiate nutrition consult as needed

## 2023-03-06 NOTE — PROGRESS NOTES
Internal Medicine Progress Note    Date: 3/6/2023   Patient: [address] Day: 4      CC: Hyperglycemia       Interval Hx   Patient seen at bedside. No overnight events. Patient continues to be noncompliant with diabetic diet. Patient BP improving 157/97 this morning after medications, was 175/128 earlier around 8am.  He was seen by endocrinology yesterday and he was started on eplerenone. Patient will follow-up with Dr. 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 BG in the 200s this morning. Objective     Vital Signs:  Patient Vitals for the past 8 hrs:   BP Temp Temp src Pulse Resp SpO2 Weight   03/06/23 0445 (!) 165/118 98.2 °F (36.8 °C) Axillary 96 22 97 % --   03/06/23 0435 -- -- -- -- -- -- (!) 325 lb 13.4 oz (147.8 kg)         Physical Exam  Constitutional:       General: He is not in acute distress. Appearance: He is obese. HENT:      Nose: Nose normal.   Eyes:      Pupils: Pupils are equal, round, and reactive to light. Cardiovascular:      Rate and Rhythm: Normal rate. Pulses: Normal pulses. Heart sounds: Normal heart sounds. Pulmonary:      Effort: Pulmonary effort is normal. No respiratory distress. Breath sounds: Normal breath sounds. No wheezing. Abdominal:      General: Bowel sounds are normal.      Palpations: Abdomen is soft. Tenderness: There is no abdominal tenderness. Musculoskeletal:      Right lower leg: No edema. Left lower leg: No edema. Skin:     General: Skin is warm and dry. Capillary Refill: Capillary refill takes less than 2 seconds. Neurological:      General: No focal deficit present. Mental Status: He is alert.    Psychiatric:         Mood and Affect: Mood normal.         Behavior: Behavior normal.          Labs:  CBC:   Recent Labs     03/04/23  0512 03/05/23  0509 03/06/23  0523   WBC 5.1 5.9 6.2   HGB 12.3* 12.9* 12.9*   HCT 38.8* 40.3* 40.0*    179 190         BMP: Recent Labs     03/04/23  0513 03/05/23  0509 03/06/23  0523   * 138 135*   K 4.0 3.9 4.9   CL 92* 99 95*   CO2 29 30 28   BUN 20 19 25*   CREATININE 1.3 1.2 1.3   GLUCOSE 508* 185* 246*       Magnesium:   Recent Labs     03/04/23  0513 03/05/23  0509 03/06/23  0523   MG 1.90 1.90 1.80       LFT's:   Recent Labs     03/03/23  1102   AST 22   ALT 71*   BILITOT <0.2   ALKPHOS 86           U/A:   No results for input(s): NITRITE, COLORU, PHUR, LABCAST, WBCUA, RBCUA, MUCUS, TRICHOMONAS, YEAST, BACTERIA, CLARITYU, SPECGRAV, LEUKOCYTESUR, UROBILINOGEN, BILIRUBINUR, BLOODU, GLUCOSEU, KETONES, AMORPHOUS in the last 72 hours. Radiology:  CT CHEST PULMONARY EMBOLISM W CONTRAST   Final Result      1. No evidence of any pulmonary thromboembolus, aneurysm or dissection. 2. Patchy multifocal airspace disease predominantly in the right lower lobe and left lower lobe with some patchy groundglass minimal upper lobe changes in both lungs, most likely suggestive of atypical pneumonia or viral pneumonia or pneumonitis. Correlate clinically      XR CHEST (2 VW)   Final Result      Interval clearing of airspace disease throughout both lungs since prior exam from February 2023. No acute chest process. VL Renal Arterial Duplex Complete    (Results Pending)         Assessment & Plan       COVID 19 Pneumonia  -Patient presented with complaints of cough productive of brownish sputum, shortness of breath, new oxygen requirement on 2 L O2 NC, Pro arpit neg , ESR 3, CRP 8.5, MRSA DNA probe neg, COVID 19 +ve  -CT PE with no evidence of PE, aneurysm or dissection.  Patchy multifocal airspace disease predominantly in the right lower lobe and left lower lobe with some patchy groundglass minimal upper lobe changes in both lungs, most likely suggestive of atypical pneumonia or viral pneumonia or pneumonitis  -Culture, respiratory, urine Legionella antigen, respiratory virus mini panel pending    -Antibiotics discontinued  -Continue symptomatic management     Hypertensive Urgency  Likely 2/2 to bilateral adrenal nodular hyperplasia   -BP on admission was 202/135, CT Adrenals: 11/19/2022 with diffuse nodular adrenal hyperplasia. -CXR: no evidence of pulm edema.   -Serum cortisol elevated, 30.1, aldosterone 4.5  -Last  echo HFpEF 50%, 11/2022  -Gradually reduce SBP to goal < 130  -Continue carvedilol, hydralazine, eplenerone, nifedipine and HCTZ  -Monitor BP  -Endocrinology consulted: lorenza recs. Diabetes Mellitus type II   Poorly controlled   -Blood glucose  on admission was 405. -A1c on 11/15/2022 was 12.6, repeat A1c 11.6 3/3/23  -Continue Lantus 100 BID   -27 units lispro TID  -HDSSI, hypoglycemic protocol, POC glucose checks   -Monitor blood glucose     Elevated troponin  Likely demand ischemia secondary to hypertensive urgency  -Troponin elevated to 0.1 > 0.09  -EKG with no acute ischemic changes, QTC prolonged to 474.   -On continuous telemetry     HFrEF  -19 lb weight gain from prior admission in 2/14, weighed 311 lb, currently weighing 330 lb. -Echo 11/22: EF 50%, indeterminate diastolic dysfunction   -Continue Lasix, hydralazine and carvedilol  -Strict Is and Os  -Daily standing weights  -Low sodium diet   -Consider starting entresto/SGLT2s if no contra-indications     Chronic medical conditions   Schizoaffective disorder:   -On quetiapine, haloperidol, depakote, benztropine, seroquel    -Monitor Qtc, daily EKGs. Mixed Hyperlipidemia:   -Continue fenofibrate, atorvastatin     DVT PPx: Lovenox   Diet: ADULT DIET; Regular; 3 carb choices (45 gm/meal);  Low Sodium (2 gm)   Code status:  Full Code   Disposition: Possible discharge today      Will discuss with attending physician Junior Neil MD     _____________________  Miguel Moon MD   Internal Medicine Resident, PGY-1

## 2023-03-06 NOTE — PROGRESS NOTES
Physician Progress Note      PATIENT:               Rakan Mccray  CSN #:                  [id number]  :                       1980  ADMIT DATE:       3/1/2023 8:44 PM  [address] [place] DATE:  RESPONDING  PROVIDER #:        Heriberto Rubinstein MD          QUERY TEXT:    Patient admitted with diaphoresis, tachycardia. Noted documentation of Acute   HFpEF in PN 3/2  and Chronic HFrEF in PN 3/3. If possible, please document in progress notes and discharge summary if you   are evaluating and /or treating any of the following: The medical record reflects the following:  Risk Factors: 37 yo w/ hx of CHF, recent exacerbation. Clinical Indicators: Per PN 3/2: acute hfpef. Per PN 3/3: Chronic HFrEF. Per   PN 3/5: HFrEF-19 lb weight gain from prior admission in , weighed 311 lb,   currently weighing 330 lb. -Echo : EF 50%, indeterminate diastolic   dysfunction. Treatment: IV Lasix 40 mg X1 dose, 80MG X2 doses, I&O's, low sodium diet,   daily weights. Options provided:  -- Chronic systolic CHF confirmed and acute on chronic systolic CHF ruled out  -- Acute on chronic systolic CHF confirmed and chronic systolic CHF ruled out  -- Acute on chronic diastolic CHF confirmed and chronic diastolic CHF ruled   out  -- Other - I will add my own diagnosis  -- Disagree - Not applicable / Not valid  -- Disagree - Clinically unable to determine / Unknown  -- Refer to Clinical Documentation Reviewer    PROVIDER RESPONSE TEXT:    After study, chronic systolic CHF confirmed and acute on chronic systolic CHF   ruled out.     Query created by: Emilia West on 3/6/2023 10:26 AM      Electronically signed by:  Heriberto Rubinstein MD 3/6/2023 12:05 PM

## 2023-03-06 NOTE — PROGRESS NOTES
Office : [phone]     Fax :[phone]         Renal Progress Note  Subjective:   Admit Date: 3/1/2023     HPI   Kenna Mills is a 38 y.o. male with prior history of COPD, hypertension, schizoaffective disorder, CHF, diabetes who presents to the ED with complaint of Dizziness, SOB and Hyperglycemia since morning of presentation. Seemed acute in onset with progressive worsening. Had assoc N/V prior to presentation. Pt was recently admitted for CHF exacerbation and followed up with cardiology since discharge. Pt remained hypertensive and his Hydralazine and Lasix doses were increased. He was seen by nephrology outpt who recommended hypercortisolism workup given clinical signs and referral to Endo. Labs were not completed. Pt was not seen by endo yet. Pt states that his presenting symptoms have improved. He endorses facial plethora for more than several weeks. He also endorses LE edema, and being unable to control his blood sugars. Interval History:     Awaiting Aldosterone to Renin ratio    BP control improving      Not in acute distress  No vomiting  No chest pain or headaches or nausea today      DIET ADULT DIET; Regular; 3 carb choices (45 gm/meal);  Low Sodium (2 gm)  Medications:   Scheduled Meds:   magnesium sulfate  2,000 mg IntraVENous Once    [Held by provider] hydroCHLOROthiazide  25 mg Oral Daily    sennosides-docusate sodium  2 tablet Oral Daily    eplerenone  25 mg Oral BID    omega-3 acid ethyl esters  2 g Oral BID    hydrALAZINE  100 mg Oral TID    insulin glargine  50 Units SubCUTAneous BID    And    insulin glargine  50 Units SubCUTAneous BID    insulin lispro  27 Units SubCUTAneous TID     NIFEdipine  90 mg Oral Daily    albuterol-ipratropium  1 puff Inhalation 4x daily    polyethylene glycol  17 g Oral Daily    sodium chloride flush  5-40 mL IntraVENous 2 times per day    enoxaparin  40 mg SubCUTAneous BID    insulin lispro  0-16 Units SubCUTAneous TID     insulin lispro  0-4 Units SubCUTAneous Nightly    atorvastatin  80 mg Oral Nightly    haloperidol  20 mg Oral TID    benztropine  1 mg Oral BID    lidocaine  1 patch TransDERmal Daily    aspirin  81 mg Oral Daily    hydrOXYzine HCl  25 mg Oral TID    senna  1 tablet Oral BID    pantoprazole  40 mg Oral BID AC    divalproex  500 mg Oral TID    fenofibrate  160 mg Oral Daily    carvedilol  50 mg Oral BID WC    nicotine  1 patch TransDERmal Daily    QUEtiapine  200 mg Oral Q6H     Continuous Infusions:   sodium chloride      dextrose         Labs:  CBC:   Recent Labs     03/04/23  0512 03/05/23  0509 03/06/23  0523   WBC 5.1 5.9 6.2   HGB 12.3* 12.9* 12.9*    179 190       BMP:    Recent Labs     03/04/23  0513 03/05/23  0509 03/06/23  0523   * 138 135*   K 4.0 3.9 4.9   CL 92* 99 95*   CO2 29 30 28   BUN 20 19 25*   CREATININE 1.3 1.2 1.3   GLUCOSE 508* 185* 246*       Ca/Mg/Phos:   Recent Labs     03/04/23  0513 03/05/23  0509 03/06/23  0523   CALCIUM 8.8 9.5 9.6   MG 1.90 1.90 1.80       Hepatic: No results for input(s): AST, ALT, ALB, BILITOT, ALKPHOS in the last 72 hours. Troponin:   No results for input(s): TROPONINI in the last 72 hours. BNP: No results for input(s): BNP in the last 72 hours. Lipids: No results for input(s): CHOL, TRIG, HDL, LDLCALC, LABVLDL in the last 72 hours. ABGs: No results for input(s): PHART, PO2ART, GUK8FEG in the last 72 hours. INR: No results for input(s): INR in the last 72 hours.   UA:  No results for input(s): Brianne Chance, GLUCOSEU, [address] [facility], [facility], [address], BLOODU, [address] [facility], [address] [facility], [address], Fernando Lupola in the last 72 hours. Urine Microscopic:   No results for input(s): LABCAST, BACTERIA, COMU, HYALCAST, WBCUA, RBCUA, EPIU in the last 72 hours. Urine Culture: No results for input(s): LABURIN in the last 72 hours. Urine Chemistry: No results for input(s): Regina High, PROTEINUR, NAUR in the last 72 hours. Objective:   Vitals: BP (!) 157/97   Pulse 100   Temp 97.4 °F (36.3 °C) (Oral)   Resp 22   Ht 6' 1\" (1.854 m)   Wt (!) 325 lb 13.4 oz (147.8 kg)   SpO2 94%   BMI 42.99 kg/m²    Wt Readings from Last 3 Encounters:   03/06/23 (!) 325 lb 13.4 oz (147.8 kg)   02/21/23 (!) 333 lb (151 kg)   02/15/23 (!) 317 lb 0.3 oz (143.8 kg)      24HR INTAKE/OUTPUT:    Intake/Output Summary (Last 24 hours) at 3/6/2023 1132  Last data filed at 3/6/2023 1126  Gross per 24 hour   Intake 2520 ml   Output 3550 ml   Net -1030 ml       Constitutional:  OAA X3 NAD, morbidly obese, very fatigued  Skin: no rash, turgor wnl  Heent:  eomi, mmm, moon facies, facial flushing, buffalo hump  Neck: no bruits or jvd noted  Cardiovascular:  S1, S2 without m/r/g  Respiratory: CTA B without w/r/r  Abdomen:  +bs, soft, nt, nd, increased abdominal girth, no noticeable striae  Ext: + lower extremity edema  Psychiatric: mood and affect appropriate  Musculoskeletal:  Rom, muscular strength intact    Assessment and Plan:       IMAGING:  CT CHEST PULMONARY EMBOLISM W CONTRAST   Final Result      1. No evidence of any pulmonary thromboembolus, aneurysm or dissection. 2. Patchy multifocal airspace disease predominantly in the right lower lobe and left lower lobe with some patchy groundglass minimal upper lobe changes in both lungs, most likely suggestive of atypical pneumonia or viral pneumonia or pneumonitis. Correlate clinically      XR CHEST (2 VW)   Final Result      Interval clearing of airspace disease throughout both lungs since prior exam from February 2023. No acute chest process. Assessment/Plan   Hypertensive urgency  As evidenced by elevated BP >180/120, no end-organ damage  BP's during last admission ranged 150-170's  BP in recent (2/21/23) cardiology office was 160/120  Possibly 2/2 hypercortisolism      HTN might be 2/2 hypercorticolism      prominent bilateral adrenal hyperplasia Left adrenal 6.6 x 3.8 and Right 7.2 x 3.8 cm    Anemia     Acid- base/ Electrolyte imbalance     Uncontrolled DM with hyperglycemia     Obesity    COVID +       PLAN:    F/u Plasma renin /aldosterone activity  Will get Renal Artery doppler study based on Renin /aldosterone lab results   F/u serum Metanephrine free level    Overall BP control improving with current BP meds , will continue with current regimen  Would not aggressively drop BP's  Better glucose control can aid in decreasing BP's                Kaylen Brewster MD  [facility] [address] [place], [place] [phone]  [phone]       Plan of care discussed with Resident  Agree with above assessment and plan

## 2023-03-08 ENCOUNTER — TELEMEDICINE (OUTPATIENT)
Dept: CARDIOLOGY CLINIC | Age: 43
End: 2023-03-08
Payer: COMMERCIAL

## 2023-03-08 DIAGNOSIS — Z09 FOLLOW-UP EXAM: Primary | ICD-10-CM

## 2023-03-08 LAB
CORTISOL (UR), FREE: 243.5 UG/D
CORTISOL URINE, FREE (/G CRT): 140.94 UG/G CRT
CORTISOL,F,UG/L,U: 45.1 UG/L
CREATININE 24 HOUR URINE: 1728 MG/D (ref 1000–2500)
CREATININE URINE: 32 MG/DL
HOURS COLLECTED: 24
INTERPRETATION: ABNORMAL
URINE TOTAL VOLUME: 5400

## 2023-03-08 PROCEDURE — 99214 OFFICE O/P EST MOD 30 MIN: CPT | Performed by: NURSE PRACTITIONER

## 2023-03-08 NOTE — DISCHARGE SUMMARY
INTERNAL MEDICINE DEPARTMENT AT [address] [place] [address]  DISCHARGE SUMMARY    Patient ID: Mark Arciniega                                             Discharge Date: 3/7/2023   Patient's PCP: Shailesh Muñoz MD                                          Discharge Physician: Daryle Held, MD MD  Admit Date: 3/1/2023   Admitting Physician: Rodri Victoria MD    PROBLEMS DURING HOSPITALIZATION:  Present on Admission:   Hypertensive emergency      DISCHARGE DIAGNOSES:  COVID 19 Pneumonia: POA  HTN, likely secondary to Bilateral Adrenal Hyperplasia  Hypertensive Urgency; uncontrolled HTN : Possibly sec to bilateral adrenal nodular hyperplasia: POA  Bilateral Adrenal Hyperplasia with 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 male, with PMHx of COPD, hypertension, schizoaffective disorder, CHF, diabetes who presents to the ED with complaint of Dizziness, SOB and Hyperglycemia. Patient reported that since morning he had been having headaches, shortness of breath and generalized weakness. It was acute in onset progressively getting worse and he did had associated nausea and had 1 episode of vomiting in the evening as well. Patient is not a very good historian, but he indicated that his shortness of breath increased with exertion but he had been short of breath even at rest as well. He also endorses that he felt so weak in his legs that he could not get up or walk around. But he did had generalized weakness as well. On review of system patient endorses blurring of the vision, and nausea as well     However he denied any difficulty swallowing, chest pain, chest tightness, palpitations, abdominal pain, change in urinary or bowel habits or any leg swellings.      ROS:  As per HPI     ED Course:  Oriented x 4, NAD  Vitals: BP of  202/132, HR of 109, Temp of 98.7, SpO2 of 98%  Physical Exam: Tachypnea, non pitting b/l edema  Labs: Hyperglycemia 405, Pro BNP 2771 (2110 @Base), Trops 0.1, WBC wnl, UA WNL, VBG Ph 7.442, pCO2 42.3, HCO3 28.8  Imaging: CXR Non significant  Treatment: Labetalol 10x3, 1 L LR     Summary of Clinical Encounters:  02/12/2023 -- 02/18/2023 [facility] Presented with a chest pain and shortness of breath, diagnosed with hypoxic respiratory failure due to acute on chronic heart failure. Was hypertensive considered to be secondary to bilateral adrenal hyperplasia and was advised to follow-up with endocrinology in outpatient. 11/16/2022: Echo showed EF of 50% with mild concentric ventricular hypertrophy    The following issues were addressed during hospitalization:    Brenda Snyder is a 38year old male with a medical history of HFpEF (50%, 2022), cognitive impairment, schizoaffective disorder, T2DM who presented to the ED with complaints of shortness of breath. Physical Exam:  Physical Exam  Constitutional:       Appearance: He is obese. HENT:      Head: Normocephalic and atraumatic. Neurological:      Mental Status: He is alert.         Consults: nephrology, endocrinology  Disposition: long term care facility  Discharged Condition: Stable  Follow Up: Primary Care Physician in one week    DISCHARGE MEDICATION:       Medication List        START taking these medications      albuterol-ipratropium  MCG/ACT Aers inhaler  Commonly known as: COMBIVENT RESPIMAT  Inhale 1 puff into the lungs every 4 hours as needed for Wheezing     eplerenone 25 MG tablet  Commonly known as: INSPRA  Take 1 tablet by mouth 2 times daily     hydroCHLOROthiazide 25 MG tablet  Commonly known as: HYDRODIURIL  Take 1 tablet by mouth daily Hold for SBP < 130     * insulin lispro (1 Unit Dial) 100 UNIT/ML Sopn  Commonly known as: HUMALOG/ADMELOG  Inject 27 Units into the skin 3 times daily (with meals)     * insulin lispro (1 Unit Dial) 100 UNIT/ML Sopn  Commonly known as: HUMALOG/ADMELOG  Inject 0-16 Units into the skin 3 times daily (with meals)     * insulin lispro (1 Unit Dial) 100 UNIT/ML Sopn  Commonly known as: HUMALOG/ADMELOG  Inject 0-4 Units into the skin nightly     NIFEdipine 30 MG extended release tablet  Commonly known as: ADALAT CC  Take 3 tablets by mouth daily Hold for SBP < 130     omega-3 acid ethyl esters 1 g capsule  Commonly known as: LOVAZA  Take 2 capsules by mouth 2 times daily           * This list has 3 medication(s) that are the same as other medications prescribed for you. Read the directions carefully, and ask your doctor or other care provider to review them with you. CHANGE how you take these medications      furosemide 40 MG tablet  Commonly known as: LASIX  What changed: Another medication with the same name was removed. Continue taking this medication, and follow the directions you see here. * insulin glargine 100 UNIT/ML injection pen  Commonly known as: LANTUS;BASAGLAR  Inject 50 Units into the skin 2 times daily  What changed:   how much to take  when to take this     * insulin glargine 100 UNIT/ML injection pen  Commonly known as: LANTUS;BASAGLAR  Inject 50 Units into the skin 2 times daily  What changed: You were already taking a medication with the same name, and this prescription was added. Make sure you understand how and when to take each. * This list has 2 medication(s) that are the same as other medications prescribed for you. Read the directions carefully, and ask your doctor or other care provider to review them with you.                 CONTINUE taking these medications      acetaminophen 325 MG tablet  Commonly known as: TYLENOL     aspirin 81 MG chewable tablet     atorvastatin 80 MG tablet  Commonly known as: LIPITOR  Take 1 tablet by mouth nightly     benztropine 1 MG tablet  Commonly known as: COGENTIN     carvedilol 25 MG tablet  Commonly known as: COREG  Take 2 tablets by mouth 2 times daily (with meals)     divalproex 125 MG DR capsule  Commonly known as: DEPAKOTE SPRINKLE     dulaglutide 1.5 MG/0.5ML SC injection  Commonly known as: TRULICITY     fenofibrate micronized 200 MG capsule  Commonly known as: LOFIBRA     haloperidol 20 MG tablet  Commonly known as: HALDOL     hydrALAZINE 100 MG tablet  Commonly known as: APRESOLINE  Take 1 tablet by mouth 3 times daily     hydrOXYzine HCl 25 MG tablet  Commonly known as: ATARAX     omeprazole 20 MG delayed release capsule  Commonly known as: PRILOSEC     QUEtiapine 200 MG tablet  Commonly known as: SEROQUEL     senna 8.6 MG tablet  Commonly known as: SENOKOT     vitamin B-12 1000 MCG tablet  Commonly known as: CYANOCOBALAMIN            STOP taking these medications      insulin aspart 100 UNIT/ML injection pen  Commonly known as: NovoLOG     metFORMIN 1000 MG tablet  Commonly known as: GLUCOPHAGE     NovoLOG FlexPen 100 UNIT/ML injection pen  Generic drug: insulin aspart     spironolactone 50 MG tablet  Commonly known as: ALDACTONE               Where to Get Your Medications        These medications were sent to [facility], [address] Salvador Koehler [phone] - F [phone] [address] [facility] [address]      Phone: [phone]   albuterol-ipratropium  MCG/ACT Aers inhaler  carvedilol 25 MG tablet  eplerenone 25 MG tablet  hydrALAZINE 100 MG tablet  hydroCHLOROthiazide 25 MG tablet  insulin glargine 100 UNIT/ML injection pen  insulin glargine 100 UNIT/ML injection pen  insulin lispro (1 Unit Dial) 100 UNIT/ML Sopn  insulin lispro (1 Unit Dial) 100 UNIT/ML Sopn  insulin lispro (1 Unit Dial) 100 UNIT/ML Sopn  NIFEdipine 30 MG extended release tablet  omega-3 acid ethyl esters 1 g capsule          Activity: activity as tolerated  Diet: diabetic diet  Wound Care: none needed    Time Spent on discharge is more than 30 minutes    Signed:  Hussain Samuel MD,  PGY-1  3/7/2023

## 2023-03-08 NOTE — PROGRESS NOTES
Consent:  She/He & health care decision maker is aware that that he may receive a bill for this virual service, depending on his insurance coverage, and has provided verbal consent to proceed: yes   Documentation:  I communicated with the patient and/or health care decision maker about our discussions of care. Details of this discussion including any medical advice provided:    I affirm this is a Patient Initiated Episode with an Established Patient who has not had a related appointment within my department in the past 7 days or scheduled within the next 24 hours. Total Time:30 minutes       [facility] Cardiology   [address]   Doxy. me virtual visit  Note    CC: fu after hosp visit it virtual due to he has Covid    The hospital issues listed below     COVID 19 Pneumonia, Patchy multifocal airspace disease: POA   Hypertensive Urgency; uncontrolled HTN : Possibly sec to bilateral adrenal nodular hyperplasia: POA  Bilateral Adrenal Hyperplasia with possible hypercortisolism : POA  Uncontrolled DM with hyperglycemia: POA- Last A1C 11.6  Chronic HFrEF: POA  Schizoaffective disorder: POA  Morbid obesity: POA  Body mass index is 42.47 kg/m². - Complicating assessment and treatment. Placing patient at risk for multiple co-morbidities as well as early death and contributing to the patient's presentation.  on weight loss      Interval Hx:  VSS wt stable SVo2 wnl on RA   Discussed meds diet wt vitals with nurse   No c/o cp/SOB edema   Fu in 3 months with blood work PTV     I have reviewed notes / any lab work EKGs stress test, angiograms, & images reviewed  I  have spent 30 minutes with pt on phone or on video visit with the patient with more than 50% spent counseling and coordinating care this patient. Objective: There were no vitals taken for this visit.   No intake or output data in the 24 hours ending 03/08/23 1134  Wt Readings from Last 3 Encounters:   03/06/23 (!) 325 lb 13.4 oz (147.8 kg)   02/21/23 (!) 333 lb (151 kg)   02/15/23 (!) 317 lb 0.3 oz (143.8 kg)       Physical Exam:   There were no vitals taken for this visit.   BP Readings from Last 3 Encounters:   03/06/23 (!) 160/117   02/21/23 (!) (P) 160/120   02/15/23 (!) 162/105     Pulse Readings from Last 3 Encounters:   03/06/23 96   02/21/23 98   02/15/23 96     No intake or output data in the 24 hours ending 03/08/23 1134  Wt Readings from Last 2 Encounters:   03/06/23 (!) 325 lb 13.4 oz (147.8 kg)   02/21/23 (!) 333 lb (151 kg)     Constitutional: He is oriented to person, place, and time / in NAD   Vitals /wt per patient at home           Reviewed  available lab work,  EKGs, images, Ricardo Roy APRN, CVNP

## 2023-03-09 LAB
ADRENOCORTICOTROPIC HORMONE: 30 PG/ML (ref 7–69)
ALDOSTERONE/RENIN ACTIVITY CALCULATION: 1.6 RATIO
ALDOSTERONE: 5.3 NG/DL
METANEPH/PLASMA INTERP: ABNORMAL
METANEPHRINE FREE PLASMA: 0.16 NMOL/L (ref 0–0.49)
NORMETANEPHRINE FREE PLASMA: 1.57 NMOL/L (ref 0–0.89)
RENIN ACTIVITY: 3.2 NG/ML/HR

## 2023-11-29 NOTE — PLAN OF CARE
PNEUMONIA vaccine- Ordered  SHINGLES vaccine- Completed   INFLUENZA vaccine-Completed   Screening tests:  Colon cancer screening--> Due 2032, 10 year schedule  Breast Cancer screening--> Due Feb 2024  Cervical Cancer Screening-->Not Indicated  DXA screening--> Completed 2022, not due at this time   During the course of the visit the patient was educated and counseled about age appropriate screening and preventive services. Completed preventive screenings were documented in the chart and orders were placed for outstanding screenings/procedures as documented in the Assessment and Plan.  Patient Instructions (the written plan) was given to the patient at check out.     Problem: Safety - Adult  Goal: Free from fall injury  Outcome: Progressing  Flowsheets (Taken 3/4/2023 0048)  Free From Fall Injury: Instruct family/caregiver on patient safety  Note: Fall precautions are in place. Bed alarm is on and in lowest position. Pt is using call light appropriately. Will continue to monitor.        Problem: Respiratory - Adult  Goal: Achieves optimal ventilation and oxygenation  Outcome: Progressing  Flowsheets (Taken 3/4/2023 0049)  Achieves optimal ventilation and oxygenation:   Assess for changes in respiratory status   Assess for changes in mentation and behavior   Position to facilitate oxygenation and minimize respiratory effort   Oxygen supplementation based on oxygen saturation or arterial blood gases     Problem: Metabolic/Fluid and Electrolytes - Adult  Goal: Glucose maintained within prescribed range  Outcome: Progressing  Flowsheets (Taken 3/4/2023 0049)  Glucose maintained within prescribed range:   Monitor blood glucose as ordered   Assess for signs and symptoms of hyperglycemia and hypoglycemia   Administer ordered medications to maintain glucose within target range   Assess barriers to adequate nutritional intake and initiate nutrition consult as needed